# Patient Record
Sex: MALE | Race: WHITE | Employment: UNEMPLOYED | URBAN - METROPOLITAN AREA
[De-identification: names, ages, dates, MRNs, and addresses within clinical notes are randomized per-mention and may not be internally consistent; named-entity substitution may affect disease eponyms.]

---

## 2022-04-27 ENCOUNTER — HOSPITAL ENCOUNTER (EMERGENCY)
Age: 62
Discharge: HOME | End: 2022-04-28
Attending: EMERGENCY MEDICINE
Payer: MEDICAID

## 2022-04-27 DIAGNOSIS — F10.920 ACUTE ALCOHOLIC INTOXICATION WITHOUT COMPLICATION (HCC): Primary | ICD-10-CM

## 2022-04-27 LAB
ACETAMINOPHEN LEVEL: <5 MCG/ML (ref 10–30)
ALBUMIN SERPL-MCNC: 4.3 G/DL (ref 3.5–5.2)
ALP BLD-CCNC: 58 U/L (ref 40–129)
ALT SERPL-CCNC: 28 U/L (ref 0–40)
AMPHETAMINE SCREEN, URINE: NOT DETECTED
ANION GAP SERPL CALCULATED.3IONS-SCNC: 16 MMOL/L (ref 7–16)
AST SERPL-CCNC: 32 U/L (ref 0–39)
BARBITURATE SCREEN URINE: POSITIVE
BASOPHILS ABSOLUTE: 0.08 E9/L (ref 0–0.2)
BASOPHILS RELATIVE PERCENT: 1.7 % (ref 0–2)
BENZODIAZEPINE SCREEN, URINE: NOT DETECTED
BILIRUB SERPL-MCNC: 0.3 MG/DL (ref 0–1.2)
BUN BLDV-MCNC: 6 MG/DL (ref 6–23)
CALCIUM SERPL-MCNC: 8.9 MG/DL (ref 8.6–10.2)
CANNABINOID SCREEN URINE: NOT DETECTED
CHLORIDE BLD-SCNC: 100 MMOL/L (ref 98–107)
CO2: 23 MMOL/L (ref 22–29)
COCAINE METABOLITE SCREEN URINE: NOT DETECTED
CREAT SERPL-MCNC: 0.6 MG/DL (ref 0.7–1.2)
EOSINOPHILS ABSOLUTE: 0.1 E9/L (ref 0.05–0.5)
EOSINOPHILS RELATIVE PERCENT: 2.1 % (ref 0–6)
ETHANOL: 124 MG/DL (ref 0–0.08)
FENTANYL SCREEN, URINE: NOT DETECTED
GFR AFRICAN AMERICAN: >60
GFR NON-AFRICAN AMERICAN: >60 ML/MIN/1.73
GLUCOSE BLD-MCNC: 87 MG/DL (ref 74–99)
HCT VFR BLD CALC: 38.5 % (ref 37–54)
HEMOGLOBIN: 14.1 G/DL (ref 12.5–16.5)
IMMATURE GRANULOCYTES #: 0.02 E9/L
IMMATURE GRANULOCYTES %: 0.4 % (ref 0–5)
LYMPHOCYTES ABSOLUTE: 1.45 E9/L (ref 1.5–4)
LYMPHOCYTES RELATIVE PERCENT: 30.5 % (ref 20–42)
Lab: ABNORMAL
MCH RBC QN AUTO: 36.2 PG (ref 26–35)
MCHC RBC AUTO-ENTMCNC: 36.6 % (ref 32–34.5)
MCV RBC AUTO: 99 FL (ref 80–99.9)
METHADONE SCREEN, URINE: NOT DETECTED
MONOCYTES ABSOLUTE: 0.27 E9/L (ref 0.1–0.95)
MONOCYTES RELATIVE PERCENT: 5.7 % (ref 2–12)
NEUTROPHILS ABSOLUTE: 2.83 E9/L (ref 1.8–7.3)
NEUTROPHILS RELATIVE PERCENT: 59.6 % (ref 43–80)
OPIATE SCREEN URINE: NOT DETECTED
OXYCODONE URINE: NOT DETECTED
PDW BLD-RTO: 12.6 FL (ref 11.5–15)
PHENCYCLIDINE SCREEN URINE: NOT DETECTED
PLATELET # BLD: 266 E9/L (ref 130–450)
PMV BLD AUTO: 9.7 FL (ref 7–12)
POTASSIUM REFLEX MAGNESIUM: 3.6 MMOL/L (ref 3.5–5)
RBC # BLD: 3.89 E12/L (ref 3.8–5.8)
SALICYLATE, SERUM: <0.3 MG/DL (ref 0–30)
SODIUM BLD-SCNC: 139 MMOL/L (ref 132–146)
TOTAL PROTEIN: 7.7 G/DL (ref 6.4–8.3)
TRICYCLIC ANTIDEPRESSANTS SCREEN SERUM: NEGATIVE NG/ML
WBC # BLD: 4.8 E9/L (ref 4.5–11.5)

## 2022-04-27 PROCEDURE — 80143 DRUG ASSAY ACETAMINOPHEN: CPT

## 2022-04-27 PROCEDURE — 80053 COMPREHEN METABOLIC PANEL: CPT

## 2022-04-27 PROCEDURE — 80307 DRUG TEST PRSMV CHEM ANLYZR: CPT

## 2022-04-27 PROCEDURE — 82077 ASSAY SPEC XCP UR&BREATH IA: CPT

## 2022-04-27 PROCEDURE — 6360000002 HC RX W HCPCS: Performed by: INTERNAL MEDICINE

## 2022-04-27 PROCEDURE — 85025 COMPLETE CBC W/AUTO DIFF WBC: CPT

## 2022-04-27 PROCEDURE — 99284 EMERGENCY DEPT VISIT MOD MDM: CPT

## 2022-04-27 PROCEDURE — 80179 DRUG ASSAY SALICYLATE: CPT

## 2022-04-27 PROCEDURE — 2500000003 HC RX 250 WO HCPCS: Performed by: INTERNAL MEDICINE

## 2022-04-27 PROCEDURE — 96374 THER/PROPH/DIAG INJ IV PUSH: CPT

## 2022-04-27 PROCEDURE — 96375 TX/PRO/DX INJ NEW DRUG ADDON: CPT

## 2022-04-27 RX ORDER — FOLIC ACID 5 MG/ML
1 INJECTION, SOLUTION INTRAMUSCULAR; INTRAVENOUS; SUBCUTANEOUS DAILY
Status: DISCONTINUED | OUTPATIENT
Start: 2022-04-27 | End: 2022-04-28 | Stop reason: HOSPADM

## 2022-04-27 RX ORDER — THIAMINE HYDROCHLORIDE 100 MG/ML
100 INJECTION, SOLUTION INTRAMUSCULAR; INTRAVENOUS DAILY
Status: DISCONTINUED | OUTPATIENT
Start: 2022-04-27 | End: 2022-04-28 | Stop reason: HOSPADM

## 2022-04-27 RX ADMIN — FOLIC ACID 1 MG: 5 INJECTION, SOLUTION INTRAMUSCULAR; INTRAVENOUS; SUBCUTANEOUS at 15:44

## 2022-04-27 RX ADMIN — THIAMINE HYDROCHLORIDE 100 MG: 100 INJECTION, SOLUTION INTRAMUSCULAR; INTRAVENOUS at 15:43

## 2022-04-27 ASSESSMENT — PAIN - FUNCTIONAL ASSESSMENT: PAIN_FUNCTIONAL_ASSESSMENT: NONE - DENIES PAIN

## 2022-04-27 NOTE — ED NOTES
Patient argumentative, cursing loudly about wanting to smoke, after police arrived, patient began to calm down. Patient decided to fall asleep.       Jose Patino RN  04/27/22 3271

## 2022-04-27 NOTE — ED NOTES
HISTORY OF PRESENT ILLNESS:  (Nurses Notes Reviewed)    Chief Complaint:  Alcohol Intoxication (states he wants help. Last drink was approx 20 min ago and it was beer )     Breana Posey is a 57 y.o. old male presenting to the emergency department for alcohol intoxication. Patient's last drink was this morning although he can't recall exactly when. He says he came from [facility], [address] say how he arrived here. He says he has been drinking beer and vodka mostly everyday. And says he drinks \"A LOT\" but doesn't want to quantify. He is disoriented and endorses headaches, says that he always has constant generalized headache. Denies any trauma. Denies any illicit drug intake. REVIEW OF SYSTEMS    Pertinent positives and negatives are stated within the HPI, all other systems reviewed and are negative. At least 10 systems reviewed with the patient and pertinent negative symptoms listed below. Consitutional: No fever chills change or unexplained weight loss. Eye: No vision changes or diplopia  ENT: No dysphagia or ear drainage  Head: (+) headaches  Neck No neck rigidity deformity or masses  Lungs: No cough wheezing or hemoptysis  Heart: no palpitation diaphoresis or syncope  Abdomen No Abdominal pain or hematemesis. Genitourinary: No discharge or genital lesions reported. Neurologic: No weakness numbness or gait abnormalities. Psychiatric; No hallucination, delusion homicidal or suicidal thoughts. Physical Exam:    General:  No acute distress noted. Patient is well nourished and well developed. Vital signs and nurses assesement reviewed. Head: Normocephalic, atraumatic, oral exam showed no abnormalities. Eyes: PERRLA, EOMI, No scleral icterus or papliedema. Neck: No thyroid masses, carotid bruits, tracheal deviation or significant adenopathy. Lungs: Clear to auscultation bilaterally. No rales, rhonchi or pleural rubs. Heart: Normal PMI, No gallop, murmurs, rubs or abnormal heart sounds.   Abdomen: Soft without tenderness rigidity, rebound tenderness, organ enlargement or masses. Normal bowel sounds activity. Extremities: No dependent edema, cyanosis, calf tenderness or deformities. Jesse's sign negative bilaterally. Peripheral pulses were normal and symmetrical.  Neurologic: Awake and alert, with normal speech and comprehension. Cranial nerve grossly intact, normal muscle power, tone and deep tendon reflexes. No sensory loss. Normal gait and co-ordination, mild bilateral hand tremors   Skin: No rashes, ulcers, cyanosis or pallor. Capillary refill normal.      --------------------------------------------- PAST HISTORY ---------------------------------------------      Past Medical History:  has no past medical history on file. Past Surgical History:  has a past surgical history that includes Humerus surgery (Left, 5/6/16). Social History:  reports that he has been smoking. He does not have any smokeless tobacco history on file. He reports current alcohol use. Family History: family history is not on file. The patients home medications have been reviewed.   Allergies: Patient has no allergy information on record.    -------------------------------------------------- RESULTS -------------------------------------------------      Results for orders placed or performed during the hospital encounter of 04/27/22   CBC with Auto Differential   Result Value Ref Range    WBC 4.8 4.5 - 11.5 E9/L    RBC 3.89 3.80 - 5.80 E12/L    Hemoglobin 14.1 12.5 - 16.5 g/dL    Hematocrit 38.5 37.0 - 54.0 %    MCV 99.0 80.0 - 99.9 fL    MCH 36.2 (H) 26.0 - 35.0 pg    MCHC 36.6 (H) 32.0 - 34.5 %    RDW 12.6 11.5 - 15.0 fL    Platelets 379 145 - 773 E9/L    MPV 9.7 7.0 - 12.0 fL    Neutrophils % 59.6 43.0 - 80.0 %    Immature Granulocytes % 0.4 0.0 - 5.0 %    Lymphocytes % 30.5 20.0 - 42.0 %    Monocytes % 5.7 2.0 - 12.0 %    Eosinophils % 2.1 0.0 - 6.0 %    Basophils % 1.7 0.0 - 2.0 %    Neutrophils Absolute 2.83 1.80 - 7.30 E9/L Immature Granulocytes # 0.02 E9/L    Lymphocytes Absolute 1.45 (L) 1.50 - 4.00 E9/L    Monocytes Absolute 0.27 0.10 - 0.95 E9/L    Eosinophils Absolute 0.10 0.05 - 0.50 E9/L    Basophils Absolute 0.08 0.00 - 0.20 E9/L   URINE DRUG SCREEN   Result Value Ref Range    Drug Screen Comment: see below      No orders to display       All above test results were reviewed in details. ------------------------- NURSING NOTES AND VITALS REVIEWED ---------------------------   The nursing notes within the ED encounter and vital signs as below have been reviewed. BP (!) 122/56   Pulse 63   Temp 97.2 °F (36.2 °C)   Resp 18   Ht 6' 4\" (1.93 m)   Wt 220 lb (99.8 kg)   SpO2 97%   BMI 26.78 kg/m²   Oxygen Saturation Interpretation: Normal      ------------------------------------------ PROGRESS NOTES ------------------------------------------       I have spoken with the patient and discussed todays results, in addition to providing specific details for the plan of care and counseling regarding the diagnosis and prognosis. Their questions are answered at this time and they are agreeable with the plan. Medical Decision Making Rationale: This patient presented emergency room with the symptoms of alcohol intoxication. Patient given thiamine and folate. CBC, CMP, Ethanol and urine drug screen. UDS (+) for Barbiturates. --------------------------------- ADDITIONAL PROVIDER NOTES ---------------------------------      Disposition pending sobriety, possible social work consult for homelessness. Clinical Impression:  1.  Alcohol Intoxication     Chandrakant Santiago MD  Resident  04/27/22 8924

## 2022-04-27 NOTE — ED NOTES
Patient became verbally abusive, pd called to calm the patient down.  Patient now eating some food and has calmed down after officers spoke to him      Manuel Myrick RN  04/27/22 0984

## 2022-04-28 VITALS
OXYGEN SATURATION: 98 % | TEMPERATURE: 97.5 F | DIASTOLIC BLOOD PRESSURE: 90 MMHG | WEIGHT: 220 LBS | HEART RATE: 70 BPM | HEIGHT: 76 IN | RESPIRATION RATE: 18 BRPM | BODY MASS INDEX: 26.79 KG/M2 | SYSTOLIC BLOOD PRESSURE: 162 MMHG

## 2022-04-28 LAB
ACETAMINOPHEN LEVEL: <5 MCG/ML (ref 10–30)
ETHANOL: <10 MG/DL (ref 0–0.08)
SALICYLATE, SERUM: <0.3 MG/DL (ref 0–30)
TRICYCLIC ANTIDEPRESSANTS SCREEN SERUM: NEGATIVE NG/ML

## 2022-04-28 PROCEDURE — 80143 DRUG ASSAY ACETAMINOPHEN: CPT

## 2022-04-28 PROCEDURE — 6360000002 HC RX W HCPCS: Performed by: INTERNAL MEDICINE

## 2022-04-28 PROCEDURE — 2500000003 HC RX 250 WO HCPCS: Performed by: INTERNAL MEDICINE

## 2022-04-28 PROCEDURE — 96374 THER/PROPH/DIAG INJ IV PUSH: CPT

## 2022-04-28 PROCEDURE — 80307 DRUG TEST PRSMV CHEM ANLYZR: CPT

## 2022-04-28 PROCEDURE — 82077 ASSAY SPEC XCP UR&BREATH IA: CPT

## 2022-04-28 PROCEDURE — 80179 DRUG ASSAY SALICYLATE: CPT

## 2022-04-28 PROCEDURE — 96375 TX/PRO/DX INJ NEW DRUG ADDON: CPT

## 2022-04-28 PROCEDURE — 6370000000 HC RX 637 (ALT 250 FOR IP): Performed by: STUDENT IN AN ORGANIZED HEALTH CARE EDUCATION/TRAINING PROGRAM

## 2022-04-28 PROCEDURE — 36415 COLL VENOUS BLD VENIPUNCTURE: CPT

## 2022-04-28 RX ORDER — ACETAMINOPHEN 325 MG/1
650 TABLET ORAL ONCE
Status: COMPLETED | OUTPATIENT
Start: 2022-04-28 | End: 2022-04-28

## 2022-04-28 RX ADMIN — ACETAMINOPHEN 650 MG: 325 TABLET ORAL at 00:24

## 2022-04-28 RX ADMIN — THIAMINE HYDROCHLORIDE 100 MG: 100 INJECTION, SOLUTION INTRAMUSCULAR; INTRAVENOUS at 10:00

## 2022-04-28 RX ADMIN — FOLIC ACID 1 MG: 5 INJECTION, SOLUTION INTRAMUSCULAR; INTRAVENOUS; SUBCUTANEOUS at 11:42

## 2022-04-28 NOTE — ED NOTES
The pt is a 57 yr old white male who stated when he arrived by [facility] that his last drink was 20 min ago. He is now sober 10 hours later and he stated that his  brought him to a rehab from [facility] and he has no idea how he got here. He stated that he also is unsure as to what rehab that he was sent to. He denied any hx of SI and HI (CSSRS completed). .  He stated that he drinks everyday the equivalent to a 12 pack plus some liquor. He reported that he has a hx of having a seizure in the past but stated that it was \"years ago\". He denied that he has seizures regularly with withdrawal.  He stated that he does go through withdrawal when he stops drinking but denies current symptoms at this time. The pt stated that he would still like to be referred to rehab/detox. Call to Fox Sen at [facility]- Inquired as to wether or not the pt was with them earlier for admission. She stated that she is not sure and would return call- requesting new etoh level. After to fax paperwork.      Allison Wilcox, [facility]  04/27/22 6940

## 2022-04-28 NOTE — ED NOTES
SILVANO CARRANZA faxed redraw results to [employer] [facility] at [facility].      LISSA Browning, [place]  04/28/22 2007

## 2022-04-28 NOTE — ED NOTES
Garima from lab sts ethanol level is still running and will be \"done shortly. \"      Karla Enrique RN  04/27/22 2023

## 2022-04-28 NOTE — ED NOTES
SLIVANO CARRANZA spoke with Prema at [facility], she reports she did not receive fax for redraw.  Prema is reporting something is wrong with fax machine and is requesting we finish this referral with morning staff to please call after LISSA Vo, [facility]  04/28/22 0360

## 2022-04-28 NOTE — ED NOTES
[facility] with provide a ride - picking up in the ER and transporting to their facility.      LORETO Gomez  04/28/22 1124

## 2022-04-28 NOTE — ED NOTES
I called [facility] [phone] and spoke to Isabel Givens [phone] and gave basic information    Pt is already on their wait list for [facility] MRN #[id number] - no assessment needed at this time    Faxed chart information to [phone] to Anatoliy Coffman [phone]    Awaiting call back with acceptance     Kaitlyn Zepeda, LORETO  04/28/22 8888

## 2022-04-28 NOTE — ED NOTES
Pt has been accepted to [facility] and is scheduled for 1200     Discussed plan with Dr. Oh Keene     Pt has no money, is not familiar with the bus system to get him to [place] by 1200.     Inquiring about transportation through [place].      LORETO Solitario  04/28/22 1127

## 2022-04-28 NOTE — ED NOTES
Prema from [facility] called to see if redraw has been completed on this patient. Informed one had been ordered but not completed at this time. SILVANO CARRANZA informed Prema that RN will be reminded and results will be faxed to her at [phone].       SILVANO CARRANZA informed RN for this patient     LISSA Gifford, [place]  04/28/22 8706

## 2022-05-03 NOTE — ED PROVIDER NOTES
HISTORY OF PRESENT ILLNESS:  (Nurses Notes Reviewed)     Chief Complaint:  Alcohol Intoxication (states he wants help. Last drink was approx 20 min ago and it was beer )                 Monique Muniz is a 57 y.o. old male presenting to the emergency department for alcohol intoxication. Patient's last drink was this morning although he can't recall exactly when. He says he came from [facility], [address] [place] [address] Road say how he arrived here. He says he has been drinking beer and vodka mostly everyday. And says he drinks \"A LOT\" but doesn't want to quantify. He is disoriented and endorses headaches, says that he always has constant generalized headache. Denies any trauma. Denies any illicit drug intake.      REVIEW OF SYSTEMS     Pertinent positives and negatives are stated within the HPI, all other systems reviewed and are negative.      At least 10 systems reviewed with the patient and pertinent negative symptoms listed below.     Consitutional: No fever chills change or unexplained weight loss. Eye: No vision changes or diplopia  ENT: No dysphagia or ear drainage  Head: (+) headaches  Neck No neck rigidity deformity or masses  Lungs: No cough wheezing or hemoptysis  Heart: no palpitation diaphoresis or syncope  Abdomen No Abdominal pain or hematemesis. Genitourinary: No discharge or genital lesions reported. Neurologic: No weakness numbness or gait abnormalities. Psychiatric; No hallucination, delusion homicidal or suicidal thoughts.        Physical Exam:     General:  No acute distress noted. Patient is well nourished and well developed. Vital signs and nurses assesement reviewed. Head: Normocephalic, atraumatic, oral exam showed no abnormalities. Eyes: PERRLA, EOMI, No scleral icterus or papliedema. Neck: No thyroid masses, carotid bruits, tracheal deviation or significant adenopathy. Lungs: Clear to auscultation bilaterally. No rales, rhonchi or pleural rubs.   Heart: Normal PMI, No gallop, murmurs, rubs or abnormal heart sounds. Abdomen: Soft without tenderness rigidity, rebound tenderness, organ enlargement or masses. Normal bowel sounds activity. Extremities: No dependent edema, cyanosis, calf tenderness or deformities. Jesse's sign negative bilaterally. Peripheral pulses were normal and symmetrical.  Neurologic: Awake and alert, with normal speech and comprehension. Cranial nerve grossly intact, normal muscle power, tone and deep tendon reflexes. No sensory loss. Normal gait and co-ordination, mild bilateral hand tremors   Skin: No rashes, ulcers, cyanosis or pallor. Capillary refill normal.        --------------------------------------------- PAST HISTORY ---------------------------------------------        Past Medical History:  has no past medical history on file. Past Surgical History:  has a past surgical history that includes Humerus surgery (Left, 5/6/16). Social History:  reports that he has been smoking. He does not have any smokeless tobacco history on file. He reports current alcohol use. Family History: family history is not on file. The patients home medications have been reviewed.   Allergies: Patient has no allergy information on record.     -------------------------------------------------- RESULTS -------------------------------------------------              Results for orders placed or performed during the hospital encounter of 04/27/22   CBC with Auto Differential   Result Value Ref Range     WBC 4.8 4.5 - 11.5 E9/L     RBC 3.89 3.80 - 5.80 E12/L     Hemoglobin 14.1 12.5 - 16.5 g/dL     Hematocrit 38.5 37.0 - 54.0 %     MCV 99.0 80.0 - 99.9 fL     MCH 36.2 (H) 26.0 - 35.0 pg     MCHC 36.6 (H) 32.0 - 34.5 %     RDW 12.6 11.5 - 15.0 fL     Platelets 909 434 - 706 E9/L     MPV 9.7 7.0 - 12.0 fL     Neutrophils % 59.6 43.0 - 80.0 %     Immature Granulocytes % 0.4 0.0 - 5.0 %     Lymphocytes % 30.5 20.0 - 42.0 %     Monocytes % 5.7 2.0 - 12.0 %     Eosinophils % 2.1 0.0 - 6.0 %     Basophils % 1.7 0.0 - 2.0 %     Neutrophils Absolute 2.83 1.80 - 7.30 E9/L     Immature Granulocytes # 0.02 E9/L     Lymphocytes Absolute 1.45 (L) 1.50 - 4.00 E9/L     Monocytes Absolute 0.27 0.10 - 0.95 E9/L     Eosinophils Absolute 0.10 0.05 - 0.50 E9/L     Basophils Absolute 0.08 0.00 - 0.20 E9/L   URINE DRUG SCREEN   Result Value Ref Range     Drug Screen Comment: see below        No orders to display         All above test results were reviewed in details.        ------------------------- NURSING NOTES AND VITALS REVIEWED ---------------------------              The nursing notes within the ED encounter and vital signs as below have been reviewed. BP (!) 122/56   Pulse 63   Temp 97.2 °F (36.2 °C)   Resp 18   Ht 6' 4\" (1.93 m)   Wt 220 lb (99.8 kg)   SpO2 97%   BMI 26.78 kg/m²   Oxygen Saturation Interpretation: Normal        ------------------------------------------ PROGRESS NOTES ------------------------------------------                    I have spoken with the patient and discussed todays results, in addition to providing specific details for the plan of care and counseling regarding the diagnosis and prognosis. Their questions are answered at this time and they are agreeable with the plan.     Medical Decision Making Rationale:     This patient presented emergency room with the symptoms of alcohol intoxication. Patient given thiamine and folate. CBC, CMP, Ethanol and urine drug screen. UDS (+) for Barbiturates.      --------------------------------- ADDITIONAL PROVIDER NOTES ---------------------------------        Disposition pending sobriety, possible social work consult for homelessness.       Clinical Impression:  1.  Alcohol Intoxication     Marta Siddiqi MD  Resident  04/27/22 4526 [facility] Juan Luis Holt MD  Resident  05/03/22 3506

## 2022-05-04 ENCOUNTER — APPOINTMENT (OUTPATIENT)
Dept: GENERAL RADIOLOGY | Age: 62
DRG: 197 | End: 2022-05-04
Payer: MEDICAID

## 2022-05-04 LAB
ALBUMIN SERPL-MCNC: 4.3 G/DL (ref 3.5–5.2)
ALP BLD-CCNC: 79 U/L (ref 40–129)
ALT SERPL-CCNC: 23 U/L (ref 0–40)
ANION GAP SERPL CALCULATED.3IONS-SCNC: 10 MMOL/L (ref 7–16)
AST SERPL-CCNC: 21 U/L (ref 0–39)
BASOPHILS ABSOLUTE: 0.07 E9/L (ref 0–0.2)
BASOPHILS RELATIVE PERCENT: 1.3 % (ref 0–2)
BILIRUB SERPL-MCNC: 0.2 MG/DL (ref 0–1.2)
BUN BLDV-MCNC: 22 MG/DL (ref 6–23)
C-REACTIVE PROTEIN: 0.3 MG/DL (ref 0–0.4)
CALCIUM SERPL-MCNC: 9.1 MG/DL (ref 8.6–10.2)
CHLORIDE BLD-SCNC: 101 MMOL/L (ref 98–107)
CO2: 26 MMOL/L (ref 22–29)
CREAT SERPL-MCNC: 0.7 MG/DL (ref 0.7–1.2)
EOSINOPHILS ABSOLUTE: 0.11 E9/L (ref 0.05–0.5)
EOSINOPHILS RELATIVE PERCENT: 2 % (ref 0–6)
GFR AFRICAN AMERICAN: >60
GFR NON-AFRICAN AMERICAN: >60 ML/MIN/1.73
GLUCOSE BLD-MCNC: 103 MG/DL (ref 74–99)
HCT VFR BLD CALC: 37.9 % (ref 37–54)
HEMOGLOBIN: 12.7 G/DL (ref 12.5–16.5)
IMMATURE GRANULOCYTES #: 0.01 E9/L
IMMATURE GRANULOCYTES %: 0.2 % (ref 0–5)
LACTIC ACID: 1.2 MMOL/L (ref 0.5–2.2)
LYMPHOCYTES ABSOLUTE: 1.34 E9/L (ref 1.5–4)
LYMPHOCYTES RELATIVE PERCENT: 24.8 % (ref 20–42)
MCH RBC QN AUTO: 34.2 PG (ref 26–35)
MCHC RBC AUTO-ENTMCNC: 33.5 % (ref 32–34.5)
MCV RBC AUTO: 102.2 FL (ref 80–99.9)
MONOCYTES ABSOLUTE: 0.62 E9/L (ref 0.1–0.95)
MONOCYTES RELATIVE PERCENT: 11.5 % (ref 2–12)
NEUTROPHILS ABSOLUTE: 3.25 E9/L (ref 1.8–7.3)
NEUTROPHILS RELATIVE PERCENT: 60.2 % (ref 43–80)
PDW BLD-RTO: 12.7 FL (ref 11.5–15)
PLATELET # BLD: 175 E9/L (ref 130–450)
PMV BLD AUTO: 10.6 FL (ref 7–12)
POTASSIUM REFLEX MAGNESIUM: 4.4 MMOL/L (ref 3.5–5)
RBC # BLD: 3.71 E12/L (ref 3.8–5.8)
SODIUM BLD-SCNC: 137 MMOL/L (ref 132–146)
TOTAL PROTEIN: 7.8 G/DL (ref 6.4–8.3)
WBC # BLD: 5.4 E9/L (ref 4.5–11.5)

## 2022-05-04 PROCEDURE — 73630 X-RAY EXAM OF FOOT: CPT

## 2022-05-04 PROCEDURE — 93005 ELECTROCARDIOGRAM TRACING: CPT | Performed by: PHYSICIAN ASSISTANT

## 2022-05-04 PROCEDURE — 87040 BLOOD CULTURE FOR BACTERIA: CPT

## 2022-05-04 PROCEDURE — 85651 RBC SED RATE NONAUTOMATED: CPT

## 2022-05-04 PROCEDURE — 83605 ASSAY OF LACTIC ACID: CPT

## 2022-05-04 PROCEDURE — 80053 COMPREHEN METABOLIC PANEL: CPT

## 2022-05-04 PROCEDURE — 99285 EMERGENCY DEPT VISIT HI MDM: CPT

## 2022-05-04 PROCEDURE — 85025 COMPLETE CBC W/AUTO DIFF WBC: CPT

## 2022-05-04 PROCEDURE — 86140 C-REACTIVE PROTEIN: CPT

## 2022-05-04 ASSESSMENT — LIFESTYLE VARIABLES
HOW MANY STANDARD DRINKS CONTAINING ALCOHOL DO YOU HAVE ON A TYPICAL DAY: 7 TO 9
HOW OFTEN DO YOU HAVE A DRINK CONTAINING ALCOHOL: MONTHLY OR LESS

## 2022-05-04 ASSESSMENT — PAIN DESCRIPTION - DESCRIPTORS: DESCRIPTORS: THROBBING;ACHING

## 2022-05-04 ASSESSMENT — PAIN SCALES - GENERAL: PAINLEVEL_OUTOF10: 10

## 2022-05-04 ASSESSMENT — PAIN DESCRIPTION - ORIENTATION: ORIENTATION: LEFT

## 2022-05-04 ASSESSMENT — PAIN - FUNCTIONAL ASSESSMENT: PAIN_FUNCTIONAL_ASSESSMENT: 0-10

## 2022-05-04 ASSESSMENT — PAIN DESCRIPTION - LOCATION: LOCATION: TOE (COMMENT WHICH ONE)

## 2022-05-04 ASSESSMENT — PAIN DESCRIPTION - FREQUENCY: FREQUENCY: CONTINUOUS

## 2022-05-05 ENCOUNTER — APPOINTMENT (OUTPATIENT)
Dept: INTERVENTIONAL RADIOLOGY/VASCULAR | Age: 62
DRG: 197 | End: 2022-05-05
Payer: MEDICAID

## 2022-05-05 ENCOUNTER — HOSPITAL ENCOUNTER (INPATIENT)
Age: 62
LOS: 1 days | Discharge: LEFT BEFORE DISCHARGE | DRG: 197 | End: 2022-05-05
Attending: EMERGENCY MEDICINE | Admitting: INTERNAL MEDICINE
Payer: MEDICAID

## 2022-05-05 ENCOUNTER — APPOINTMENT (OUTPATIENT)
Dept: ULTRASOUND IMAGING | Age: 62
DRG: 197 | End: 2022-05-05
Payer: MEDICAID

## 2022-05-05 VITALS
RESPIRATION RATE: 18 BRPM | TEMPERATURE: 97.5 F | DIASTOLIC BLOOD PRESSURE: 86 MMHG | BODY MASS INDEX: 26.79 KG/M2 | HEIGHT: 76 IN | SYSTOLIC BLOOD PRESSURE: 137 MMHG | WEIGHT: 220 LBS | OXYGEN SATURATION: 98 % | HEART RATE: 63 BPM

## 2022-05-05 DIAGNOSIS — S91.109A OPEN WOUND OF TOE, INITIAL ENCOUNTER: Primary | ICD-10-CM

## 2022-05-05 PROBLEM — L08.9 WOUND INFECTION: Status: ACTIVE | Noted: 2022-05-05

## 2022-05-05 PROBLEM — T14.8XXA WOUND INFECTION: Status: ACTIVE | Noted: 2022-05-05

## 2022-05-05 LAB
CHOLESTEROL, TOTAL: 158 MG/DL (ref 0–199)
EKG ATRIAL RATE: 59 BPM
EKG P AXIS: 76 DEGREES
EKG P-R INTERVAL: 164 MS
EKG Q-T INTERVAL: 450 MS
EKG QRS DURATION: 90 MS
EKG QTC CALCULATION (BAZETT): 445 MS
EKG R AXIS: 80 DEGREES
EKG T AXIS: 73 DEGREES
EKG VENTRICULAR RATE: 59 BPM
HBA1C MFR BLD: 6 % (ref 4–5.6)
HDLC SERPL-MCNC: 71 MG/DL
LDL CHOLESTEROL CALCULATED: 74 MG/DL (ref 0–99)
SEDIMENTATION RATE, ERYTHROCYTE: 41 MM/HR (ref 0–15)
TRIGL SERPL-MCNC: 65 MG/DL (ref 0–149)
URIC ACID, SERUM: 5.5 MG/DL (ref 3.4–7)
VLDLC SERPL CALC-MCNC: 13 MG/DL

## 2022-05-05 PROCEDURE — 99223 1ST HOSP IP/OBS HIGH 75: CPT | Performed by: INTERNAL MEDICINE

## 2022-05-05 PROCEDURE — 93923 UPR/LXTR ART STDY 3+ LVLS: CPT

## 2022-05-05 PROCEDURE — G0378 HOSPITAL OBSERVATION PER HR: HCPCS

## 2022-05-05 PROCEDURE — 80061 LIPID PANEL: CPT

## 2022-05-05 PROCEDURE — 36415 COLL VENOUS BLD VENIPUNCTURE: CPT

## 2022-05-05 PROCEDURE — 93971 EXTREMITY STUDY: CPT | Performed by: RADIOLOGY

## 2022-05-05 PROCEDURE — 6370000000 HC RX 637 (ALT 250 FOR IP): Performed by: INTERNAL MEDICINE

## 2022-05-05 PROCEDURE — 84550 ASSAY OF BLOOD/URIC ACID: CPT

## 2022-05-05 PROCEDURE — 1200000000 HC SEMI PRIVATE

## 2022-05-05 PROCEDURE — 96372 THER/PROPH/DIAG INJ SC/IM: CPT

## 2022-05-05 PROCEDURE — 93970 EXTREMITY STUDY: CPT

## 2022-05-05 PROCEDURE — 83036 HEMOGLOBIN GLYCOSYLATED A1C: CPT

## 2022-05-05 PROCEDURE — 93010 ELECTROCARDIOGRAM REPORT: CPT | Performed by: INTERNAL MEDICINE

## 2022-05-05 PROCEDURE — 93923 UPR/LXTR ART STDY 3+ LVLS: CPT | Performed by: SURGERY

## 2022-05-05 RX ORDER — SODIUM CHLORIDE 0.9 % (FLUSH) 0.9 %
5-40 SYRINGE (ML) INJECTION EVERY 12 HOURS SCHEDULED
Status: DISCONTINUED | OUTPATIENT
Start: 2022-05-05 | End: 2022-05-05 | Stop reason: HOSPADM

## 2022-05-05 RX ORDER — SODIUM CHLORIDE 0.9 % (FLUSH) 0.9 %
5-40 SYRINGE (ML) INJECTION PRN
Status: DISCONTINUED | OUTPATIENT
Start: 2022-05-05 | End: 2022-05-05 | Stop reason: HOSPADM

## 2022-05-05 RX ORDER — ACETAMINOPHEN 500 MG
500 TABLET ORAL EVERY 6 HOURS PRN
Qty: 12 TABLET | Refills: 0 | Status: SHIPPED | OUTPATIENT
Start: 2022-05-05 | End: 2022-05-08

## 2022-05-05 RX ORDER — ONDANSETRON 2 MG/ML
4 INJECTION INTRAMUSCULAR; INTRAVENOUS EVERY 6 HOURS PRN
Status: DISCONTINUED | OUTPATIENT
Start: 2022-05-05 | End: 2022-05-05 | Stop reason: HOSPADM

## 2022-05-05 RX ORDER — NICOTINE 21 MG/24HR
1 PATCH, TRANSDERMAL 24 HOURS TRANSDERMAL DAILY
Status: DISCONTINUED | OUTPATIENT
Start: 2022-05-05 | End: 2022-05-05 | Stop reason: HOSPADM

## 2022-05-05 RX ORDER — ACETAMINOPHEN 325 MG/1
650 TABLET ORAL EVERY 6 HOURS PRN
Status: DISCONTINUED | OUTPATIENT
Start: 2022-05-05 | End: 2022-05-05 | Stop reason: HOSPADM

## 2022-05-05 RX ORDER — SODIUM CHLORIDE 9 MG/ML
INJECTION, SOLUTION INTRAVENOUS PRN
Status: DISCONTINUED | OUTPATIENT
Start: 2022-05-05 | End: 2022-05-05 | Stop reason: HOSPADM

## 2022-05-05 RX ORDER — ACETAMINOPHEN 650 MG/1
650 SUPPOSITORY RECTAL EVERY 6 HOURS PRN
Status: DISCONTINUED | OUTPATIENT
Start: 2022-05-05 | End: 2022-05-05 | Stop reason: HOSPADM

## 2022-05-05 RX ORDER — ENOXAPARIN SODIUM 100 MG/ML
40 INJECTION SUBCUTANEOUS DAILY
Status: DISCONTINUED | OUTPATIENT
Start: 2022-05-05 | End: 2022-05-05 | Stop reason: HOSPADM

## 2022-05-05 RX ORDER — POLYETHYLENE GLYCOL 3350 17 G/17G
17 POWDER, FOR SOLUTION ORAL DAILY PRN
Status: DISCONTINUED | OUTPATIENT
Start: 2022-05-05 | End: 2022-05-05 | Stop reason: HOSPADM

## 2022-05-05 RX ORDER — ONDANSETRON 4 MG/1
4 TABLET, ORALLY DISINTEGRATING ORAL EVERY 8 HOURS PRN
Status: DISCONTINUED | OUTPATIENT
Start: 2022-05-05 | End: 2022-05-05 | Stop reason: HOSPADM

## 2022-05-05 RX ADMIN — ACETAMINOPHEN 650 MG: 325 TABLET ORAL at 09:03

## 2022-05-05 ASSESSMENT — PAIN DESCRIPTION - LOCATION: LOCATION: LEG;FOOT

## 2022-05-05 ASSESSMENT — PAIN DESCRIPTION - ORIENTATION: ORIENTATION: LEFT

## 2022-05-05 ASSESSMENT — PAIN SCALES - GENERAL
PAINLEVEL_OUTOF10: 5
PAINLEVEL_OUTOF10: 3

## 2022-05-05 ASSESSMENT — PAIN DESCRIPTION - DESCRIPTORS
DESCRIPTORS: ACHING;DISCOMFORT
DESCRIPTORS: DISCOMFORT;ACHING

## 2022-05-05 ASSESSMENT — ENCOUNTER SYMPTOMS
COLOR CHANGE: 0
SHORTNESS OF BREATH: 0
COUGH: 0
BACK PAIN: 0
CHEST TIGHTNESS: 0

## 2022-05-05 NOTE — PROGRESS NOTES
Patient came to floor from ED without IV access. This nurse talked to patient about importance of IV. Patient is refusing IV at this time.

## 2022-05-05 NOTE — PROGRESS NOTES
Patient is refusing all treatments, yelling and swearing at staff. This nurse called security and they came up to talk with patient. This nurse called the facility requesting transport for patient.

## 2022-05-05 NOTE — CARE COORDINATION
5/5/2022 social work transition of care planning  Sw attempted to meet with pt earlier,he was out of room. Sw attempted a second time, physicians came out of room and notified sw pt stated that he is leaving AMA.   Electronically signed by LORETO Escobar on 5/5/2022 at 2:39 PM

## 2022-05-05 NOTE — ED PROVIDER NOTES
ED Attending shared visit  CC: No        Department of Emergency Medicine   ED  Provider Note  Admit Date/RoomTime: 5/5/2022 12:42 AM  ED Room: 17A/17A-17  HPI:  5/5/22, Time: 12:24 AM EDT      The patient is a 19-year-old male with a history of alcohol abuse presenting to the emergency department with pain to his left great toe. Patient initially reported that he injured it 2 weeks ago and then \"was not doing what he was supposed to be doing\" to care for it. Patient states that has been hurting for 2 weeks but does not know exactly how he injured it. Patient has no primary care physician and has not had medical care in some time. He states there is some numbness to the toe. Patient reluctant to provide better history. He states he is in [place] currently for treatment for alcohol abuse. Patient denies any history of diabetes. He also denies any fevers or chills, myalgias, drainage from the toe, calf pain or swelling. The history is provided by the patient. No  was used. REVIEW OF SYSTEMS:  Review of Systems   Constitutional: Negative for activity change, chills, fatigue and fever. Respiratory: Negative for cough, chest tightness and shortness of breath. Cardiovascular: Negative for chest pain, palpitations and leg swelling. Genitourinary: Negative for dysuria, flank pain, frequency and hematuria. Musculoskeletal: Positive for arthralgias. Negative for back pain, neck pain and neck stiffness. Skin: Positive for wound. Negative for color change, pallor and rash. Neurological: Negative for dizziness, light-headedness and headaches. Psychiatric/Behavioral: Negative for agitation, behavioral problems and confusion.       Pertinent positives and negatives are stated within HPI, all other systems reviewed and are negative.      --------------------------------------------- PAST HISTORY ---------------------------------------------  Past Medical History:  has no past medical history on file. Past Surgical History:  has a past surgical history that includes Humerus surgery (Left, 5/6/16). Social History:  reports that he has been smoking cigarettes. He has a 20.00 pack-year smoking history. He has never used smokeless tobacco. He reports previous alcohol use. He reports that he does not use drugs. Family History: family history is not on file. The patients home medications have been reviewed. Allergies: Patient has no known allergies.     -------------------------------------------------- RESULTS -------------------------------------------------  All laboratory and radiology results have been personally reviewed by myself   LABS:  Results for orders placed or performed during the hospital encounter of 05/05/22   CBC with Auto Differential   Result Value Ref Range    WBC 5.4 4.5 - 11.5 E9/L    RBC 3.71 (L) 3.80 - 5.80 E12/L    Hemoglobin 12.7 12.5 - 16.5 g/dL    Hematocrit 37.9 37.0 - 54.0 %    .2 (H) 80.0 - 99.9 fL    MCH 34.2 26.0 - 35.0 pg    MCHC 33.5 32.0 - 34.5 %    RDW 12.7 11.5 - 15.0 fL    Platelets 253 829 - 606 E9/L    MPV 10.6 7.0 - 12.0 fL    Neutrophils % 60.2 43.0 - 80.0 %    Immature Granulocytes % 0.2 0.0 - 5.0 %    Lymphocytes % 24.8 20.0 - 42.0 %    Monocytes % 11.5 2.0 - 12.0 %    Eosinophils % 2.0 0.0 - 6.0 %    Basophils % 1.3 0.0 - 2.0 %    Neutrophils Absolute 3.25 1.80 - 7.30 E9/L    Immature Granulocytes # 0.01 E9/L    Lymphocytes Absolute 1.34 (L) 1.50 - 4.00 E9/L    Monocytes Absolute 0.62 0.10 - 0.95 E9/L    Eosinophils Absolute 0.11 0.05 - 0.50 E9/L    Basophils Absolute 0.07 0.00 - 0.20 E9/L   Comprehensive Metabolic Panel w/ Reflex to MG   Result Value Ref Range    Sodium 137 132 - 146 mmol/L    Potassium reflex Magnesium 4.4 3.5 - 5.0 mmol/L    Chloride 101 98 - 107 mmol/L    CO2 26 22 - 29 mmol/L    Anion Gap 10 7 - 16 mmol/L    Glucose 103 (H) 74 - 99 mg/dL    BUN 22 6 - 23 mg/dL    CREATININE 0.7 0.7 - 1.2 mg/dL    GFR Non-African American >60 >=60 mL/min/1.73    GFR African American >60     Calcium 9.1 8.6 - 10.2 mg/dL    Total Protein 7.8 6.4 - 8.3 g/dL    Albumin 4.3 3.5 - 5.2 g/dL    Total Bilirubin 0.2 0.0 - 1.2 mg/dL    Alkaline Phosphatase 79 40 - 129 U/L    ALT 23 0 - 40 U/L    AST 21 0 - 39 U/L   Lactic Acid   Result Value Ref Range    Lactic Acid 1.2 0.5 - 2.2 mmol/L   Sedimentation Rate   Result Value Ref Range    Sed Rate 41 (H) 0 - 15 mm/Hr   C-Reactive Protein   Result Value Ref Range    CRP 0.3 0.0 - 0.4 mg/dL       RADIOLOGY:  Interpreted by Radiologist.  XR FOOT LEFT (MIN 3 VIEWS)   Final Result   Soft tissue edema adjacent to the distal tuft great toe with possible small   amount of air. No definitive evidence for osteomyelitis.             ------------------------- NURSING NOTES AND VITALS REVIEWED ---------------------------   The nursing notes within the ED encounter and vital signs as below have been reviewed. BP (!) 156/87   Pulse 67   Temp 98.5 °F (36.9 °C) (Oral)   Resp 18   Ht 6' 4\" (1.93 m)   Wt 220 lb (99.8 kg)   SpO2 96%   BMI 26.78 kg/m²   Oxygen Saturation Interpretation: Normal      ---------------------------------------------------PHYSICAL EXAM--------------------------------------    Physical Exam  Vitals and nursing note reviewed. Constitutional:       General: He is not in acute distress. Appearance: Normal appearance. He is well-developed. He is not ill-appearing. Cardiovascular:      Rate and Rhythm: Normal rate and regular rhythm. Heart sounds: Normal heart sounds. No murmur heard. Pulmonary:      Effort: Pulmonary effort is normal. No respiratory distress. Breath sounds: Normal breath sounds. Musculoskeletal:         General: No swelling, tenderness or deformity. Normal range of motion. Cervical back: Normal range of motion and neck supple. No rigidity. Comments: +discoloration and necrosis to distal left great toe with nailbed involvement. Normal cap refill and 2+ DPA pulse. No fluctuance or drainage. Skin:     General: Skin is warm and dry. Capillary Refill: Capillary refill takes less than 2 seconds. Findings: No erythema. Neurological:      General: No focal deficit present. Mental Status: He is alert and oriented to person, place, and time. Mental status is at baseline. Coordination: Coordination normal.   Psychiatric:         Mood and Affect: Mood normal.         Behavior: Behavior normal.         Thought Content: Thought content normal.                    ------------------------------ ED COURSE/MEDICAL DECISION MAKING----------------------  Medications - No data to display      ED COURSE:     XR FOOT LEFT (MIN 3 VIEWS)   Final Result   Soft tissue edema adjacent to the distal tuft great toe with possible small   amount of air. No definitive evidence for osteomyelitis. Procedures:  Procedures     Medical Decision Making:   MDM   25-year-old male presenting the ED with pain and a wound to his left great toe that he states has been there for 2 weeks. He states that occurred after an injury although the wound appears to be very chronic appearing. He has some mild necrosis of the distal tip of the left toe with nailbed involvement. He is afebrile and hemodynamically stable. His labs are unremarkable including no leukocytosis or lactic acidosis. Blood cultures obtained. X-ray does show edema to the distal tuft of the great toe with possible small amount of air which correlates with the appearance of the infection. Patient will be admitted for podiatry evaluation and antibiotic treatment. Patient agreeable to the plan. Pt admitted under the care of Dr. Marguerite Steiner for [facility] and management. Antibiotics held per Dr. Marguerite Steiner. Counseling:    The emergency provider has spoken with the patient and discussed todays results, in addition to providing specific details for the plan of care and counseling regarding the diagnosis and prognosis. Questions are answered at this time and they are agreeable with the plan.      --------------------------------- IMPRESSION AND DISPOSITION ---------------------------------    IMPRESSION  1. Open wound of toe, initial encounter        DISPOSITION  Disposition: Admit to med/surg floor  Patient condition is good      Electronically signed by Jung Pantoja MD   DD: 5/5/22  **This report was transcribed using voice recognition software. Every effort was made to ensure accuracy; however, inadvertent computerized transcription errors may be present. END OF ED PROVIDER NOTE          Adarsh Dumont PA-C  05/05/22 0209  ATTENDING PROVIDER ATTESTATION:     I have personally performed and/or participated in the history, exam, medical decision making, and procedures and agree with all pertinent clinical information. I have also reviewed and agree with the past medical, family and social history unless otherwise noted. My findings/Plan: Patient presenting because of wound to toe. Patient reports its been there for last several weeks since his left great toe. Patient reporting that he believes he injured it several weeks ago. Patient reporting no chest pain no difficulty breathing he reports no abdominal pain or vomiting he is currently at [employer] for alcohol abuse. Patient reports no drainage from the wound. Patient is awake alert [place] x3 heart lung exam normal abdomen soft and nontender. .  Patient able move all extremities. Pulses intact. Patient has noted necrotic toe to his left great toe. Patient has mild redness to the affected area as well. Patient labs and x-rays noted reviewed he was made aware of findings. We did speak to internal medicine on-call. They will evaluate and admit.        Jung Pantoja MD  05/05/22 [address]ranza MD  05/05/22 Cally Cast PA-C  05/05/22 [address], MD  05/05/22 0890

## 2022-05-05 NOTE — CONSULTS
Department of Podiatry   Consult Note        Reason for Consult: Foot Wound  CHIEF COMPLAINT:  Toe pain      HISTORY OF PRESENT ILLNESS:      Yves Spears is a 57 y.o. male with a history of alcohol abuse. Podiatry consulted for evaluation of 1st ulceration of his left foot. Patient states that he first noticed the ulcer about 3 weeks ago and since it has been progressively worsening. He's not sure how it happened and does not recall any recent trauma. He mentioned seeing a podiatrist in the past but has not been seen in several months. He has tried no means of treatment to the ulcer on his own and would like to be evaluated/treated today. Patient denies any N/V/D/F/C/SOB/CP and has no other pedal complaints at this time. Past Medical History:    · History reviewed. No pertinent past medical history. Past Surgical History:        Procedure Laterality Date    HUMERUS SURGERY Left 5/6/16    ORIF   ·     Medications Prior to Admission:    · No medications prior to admission. Allergies:  Patient has no known allergies. Social History:   · TOBACCO:   reports that he has been smoking cigarettes. He has a 20.00 pack-year smoking history. He has never used smokeless tobacco.  · ETOH:   reports previous alcohol use. DRUGS:   Social History     Substance and Sexual Activity   Drug Use Never   ·     Family History:   · History reviewed. No pertinent family history. REVIEW OF SYSTEMS:    All pertinent positives and negatives as noted in HPI       LOWER EXTREMITY EXAMINATION     VASCULAR:  DP and PT pulses are palpable. CFT < 5 seconds B/L. Warm to warm from the tibial tuberosity to the distal aspect of the digits dorsally. Hair growth noted to the distal aspects dorsally. NEUROLOGIC:  Protective sensation is diminished but grossly intact    DERM:  Skin is intact and well hydrated to the bilateral lower extremities.  Left 1st digit ulceration present with mild purulent drainage, no malodor,edema present, mild erythema. Toenails 1-5 b/l are abnormal in thickness, color and length. Webspaces 1-4 b/l are C/D/I. MUSCULOSKELETAL: contracted digits b/l.  5/5 Gross Muscle strength in all 4 quadrants. CONSULTS:  IP CONSULT TO INTERNAL MEDICINE  IP CONSULT TO PODIATRY    MEDICATION:  Scheduled Meds:   sodium chloride flush  5-40 mL IntraVENous 2 times per day    enoxaparin  40 mg SubCUTAneous Daily    nicotine  1 patch TransDERmal Daily     Continuous Infusions:   sodium chloride       PRN Meds:.sodium chloride flush, sodium chloride, ondansetron **OR** ondansetron, polyethylene glycol, acetaminophen **OR** acetaminophen    RADIOLOGY:  XR FOOT LEFT (MIN 3 VIEWS)   Final Result   Soft tissue edema adjacent to the distal tuft great toe with possible small   amount of air. No definitive evidence for osteomyelitis. VL LOWER EXTREMITY ARTERIAL SEGMENTAL PRESSURES W PPG BILATERAL    (Results Pending)   US DUP LOWER EXTREMITIES BILATERAL VENOUS    (Results Pending)       Vitals:    /86   Pulse 63   Temp 97.5 °F (36.4 °C) (Temporal)   Resp 18   Ht 6' 4\" (1.93 m)   Wt 220 lb (99.8 kg)   SpO2 98%   BMI 26.78 kg/m²     LABS:   Recent Labs     05/04/22 2208   WBC 5.4   HGB 12.7   HCT 37.9        Recent Labs     05/04/22 2208      K 4.4      CO2 26   BUN 22   CREATININE 0.7     Recent Labs     05/04/22  2208   PROT 7.8       ASSESSMENTS:   1. Foot ulceration  2.onychomycosis  3. 1st digit abscess  Wound infection            PLAN:    - Patient was examined and evaluated. Reviewed patient's recent lab results, charts and pertinent diagnostic imaging. Reviewed ancillary service notes. - Vascular studies pending  - X-rays:   Soft tissue edema adjacent to the distal tuft great toe with possible small   amount of air.  No definitive evidence for osteomyelitis.    - Dressing:DSD with betadine  -Will likely schedule for surgical intervention tomorrow for  I&D/1st digit amputation of the left foot  -Will reassess pending vascular results  - Discussed patient with Dr. Aster Sotomayor  - Will continue to follow patient while they are in-house. Thank you for the opportunity to take part in the patient's care. Please do not hesitate to call for any questions or concerns.

## 2022-05-05 NOTE — DISCHARGE SUMMARY
[address]  Discharge Summary    PCP: No primary care provider on file. Admit Date:5/5/2022  Discharge Date: 5/5/2022    Admission Diagnosis:   1. Left great toe pain likely 2/2 dry gangrene   2. Elevated blood pressure likely 2/2 pain  3. History of alcohol abuse   4. History of tobacco abuse       Discharge Diagnosis:  5. Left great toe pain likely 2/2 dry gangrene   6. Elevated blood pressure likely 2/2 pain   7. History of alcohol abuse   8. History of tobacco abuse    Hospital Course: The patient is a 57 y.o. male with PMHx of alcohol abuse who presented due to left great toe pain. Patient notes that he injured his left great toe about 3 months ago. He is unsure how he injured it. He states he was seeing a foot doctor for some time. Patient notes he received some unrecalled antibiotics as well. His wound seemed to improve in the interim however in the past 2 weeks his pain started worsening prompting visit to the ED. He denies any systemic symptoms of fevers, chills, myalgias, nausea/vomiting. The patient was in our ER about 8 days ago for alcohol intoxication and rehab placement. Since then, he has been in [facility] for alcohol rehabilitation.     At the ED, BP was 156/87, and other vitals were stable. Patient was afebrile. He had no leukocytosis. CRP was normal.  ESR was slightly elevated at 41. Left foot x-ray showed edema around the great toe with possible small air. Blood cultures were sent. Patient was admitted under house team for further management. Patient received no medications or antibiotics at the ED. After admission, patient did not had any signs symptom of infection in the leg. Patient was afebrile and no leukocytosis was present. Patient did not require any antibiotic. Podiatry and wound care was consulted. Patient was scheduled for I&D and) of amputation. Vascular work-up was ordered. Patient decided to leave [place]. After discussion with the patient, patient mentioned he understands, if he leaves AMA, his sensation was getting worse and he might lose his leg but patient mention: \"I do not care\". Patient is getting discharged with Tylenol as needed for pain. /86   Pulse 63   Temp 97.5 °F (36.4 °C) (Temporal)   Resp 18   Ht 6' 4\" (1.93 m)   Wt 220 lb (99.8 kg)   SpO2 98%   BMI 26.78 kg/m²       Significant findings (history and exam, laboratory, radiological, pathology, other tests):     § General Appearance: alert and oriented to person, place and time, well developed and well- nourished, in no acute distress  § Skin: warm and dry, no rash or erythema  § Head: normocephalic and atraumatic  § Eyes: pupils equal, round, and reactive to light, extraocular eye movements intact, conjunctivae normal  § ENT: tympanic membrane, external ear and ear canal normal bilaterally, nose without deformity, nasal mucosa and turbinates normal without polyps  § Neck: supple and non-tender without mass, no thyromegaly or thyroid nodules, no cervical lymphadenopathy  § Pulmonary/Chest: clear to auscultation bilaterally- no wheezes, rales or rhonchi, normal air movement, no respiratory distress  § Cardiovascular: normal rate, regular rhythm, normal S1 and S2, no murmurs, rubs, clicks, or gallops, distal pulses intact, no carotid bruits  § Abdomen: soft, non-tender, non-distended, normal bowel sounds, no masses or organomegaly  § Extremities: no cyanosis, clubbing or edema  § Musculoskeletal: Left great toe wound with granulation tissue, erythema around the great toe, no crepitus, mild tenderness noted; dorsalis pedis and tibial pulses intact  § Neurologic: reflexes normal and symmetric, no cranial nerve deficit, gait, coordination and speech normal       Pending test results:   1. Lower extremity arterial ultrasound  2. Lipid panel  3. Hemoglobin A1c    Consults:  1. Podiatry  2.  Wound care    Condition at discharge: Unstable    Disposition: [place]    Outpatient Recommendations:  1. Need close follow-up his toe infection. Discharge Medications:     Medication List      START taking these medications    acetaminophen 500 MG tablet  Commonly known as: TYLENOL  Take 1 tablet by mouth every 6 hours as needed for Pain           Where to Get Your Medications      These medications were sent to [facility] [address] [id number]    Phone: [phone]   · acetaminophen 500 MG tablet          Internal medicine    Follow ups   Please follow up with your primary care physician (  primary care provider on file.) within 10 days of discharge from hospital. Please call as soon as possible to make an appointment. Please contact the internal medicine clinic for an appointment if you are unable to get an appointment with your PCP. Changes in healthcare    Please take all medications as indicated   Diet: regular diet    Activity: activity as tolerated   New Medications started during this hospital stay  o Tylenol for pain as needed.  Please contact us if you have any concerns, wish to change or make an appointment:  [facility] Internal medicine clinic    Phone: [phone]   Fax: [phone] [address]   Or please call the nurse line at [phone].  Should you have further questions in regards to this visit, you can review your clinical note and after visit summary document on your [email or web address] account. Other questions can be directed to our nurse line at [phone].  Other than any new prescriptions given to you today, the list of home medications on this After Visit Summary are based on information provided to us from you and your healthcare providers. This information, including the list, dose, and frequency of medications is only as accurate as the information you provided.  If you have any questions or concerns about your home medications, please contact your Primary Care Physician for further clarification.       Bolivar Hsieh MD  PGY-1   2:52 PM 5/5/2022

## 2022-05-05 NOTE — H&P
Keron Merrill 6  Internal Medicine Residency Program  History and Physical    Patient:  Ketty Jacome 57 y.o. male MRN: [id number]     Date of Service: 5/5/2022    Hospital Day: 1      Chief complaint: had concerns including Toe Pain (states that he has a wound to the left great toe. ). History of Present Illness   The patient is a 57 y.o. male with PMHx of alcohol abuse who presented due to left great toe pain. Patient notes that he injured his left great toe about 3 months ago. He is unsure how he injured it. He states he was seeing a foot doctor for some time. Patient notes he received some unrecalled antibiotics as well. His wound seemed to improve in the interim however in the past 2 weeks his pain started worsening prompting visit to the ED. He denies any systemic symptoms of fevers, chills, myalgias, nausea/vomiting. The patient was in our ER about 8 days ago for alcohol intoxication and rehab placement. Since then, he has been in [facility] for alcohol rehabilitation. At the ED, BP was 156/87, and other vitals were stable. Patient was afebrile. He had no leukocytosis. CRP was normal.  ESR was slightly elevated at 41. Left foot x-ray showed edema around the great toe with possible small air. Blood cultures were sent. Patient was admitted under house team for further management. Patient received no medications or antibiotics at the ED. Past Medical History:  History reviewed. No pertinent past medical history. Past Surgical History:        Procedure Laterality Date    HUMERUS SURGERY Left 5/6/16    ORIF       Medications Prior to Admission:    Prior to Admission medications    Not on File       Allergies:  Patient has no known allergies. Social History:   TOBACCO:   reports that he has been smoking cigarettes. He has a 20.00 pack-year smoking history. He has never used smokeless tobacco.  ETOH:   reports previous alcohol use.      Family History:   History reviewed. No pertinent family history. REVIEW OF SYSTEMS:    · Constitutional: No fever, no chills, no change in weight; good appetite  · HEENT: No blurred vision, no ear problems, no sore throat, no rhinorrhea. · Respiratory: No cough, no sputum production, no pleuritic chest pain, no shortness of breath  · Cardiology: No angina, no dyspnea on exertion, no paroxysmal nocturnal dyspnea, no orthopnea, no palpitation, no leg swelling. · Gastroenterology: No dysphagia, no reflux; no abdominal pain, no nausea or vomiting; no constipation or diarrhea.  No hematochezia   · Genitourinary: No dysuria, no frequency, hesitancy; no hematuria  · Musculoskeletal: Left big toe wound/pain  · Neurology: no focal weakness in extremities, no slurred speech, no double vision, no tingling or numbness sensation  · Endocrinology: no temperature intolerance, no polyphagia, polydipsia or polyuria  · Hematology: no increased bleeding, no bruising, no lymphadenopathy  · Skin: no skin changes noticed by patient  · Psychology: no depressed mood, no suicidal ideation    Physical Exam   · Vitals: BP (!) 156/87   Pulse 67   Temp 98.5 °F (36.9 °C) (Oral)   Resp 18   Ht 6' 4\" (1.93 m)   Wt 220 lb (99.8 kg)   SpO2 96%   BMI 26.78 kg/m²     · General Appearance: alert and oriented to person, place and time, well developed and well- nourished, in no acute distress  · Skin: warm and dry, no rash or erythema  · Head: normocephalic and atraumatic  · Eyes: pupils equal, round, and reactive to light, extraocular eye movements intact, conjunctivae normal  · ENT: tympanic membrane, external ear and ear canal normal bilaterally, nose without deformity, nasal mucosa and turbinates normal without polyps  · Neck: supple and non-tender without mass, no thyromegaly or thyroid nodules, no cervical lymphadenopathy  · Pulmonary/Chest: clear to auscultation bilaterally- no wheezes, rales or rhonchi, normal air movement, no respiratory distress  · Cardiovascular: normal rate, regular rhythm, normal S1 and S2, no murmurs, rubs, clicks, or gallops, distal pulses intact, no carotid bruits  · Abdomen: soft, non-tender, non-distended, normal bowel sounds, no masses or organomegaly  · Extremities: no cyanosis, clubbing or edema  · Musculoskeletal: Left great toe wound with granulation tissue, erythema around the great toe, no crepitus, mild tenderness noted; dorsalis pedis and tibial pulses intact  · Neurologic: reflexes normal and symmetric, no cranial nerve deficit, gait, coordination and speech normal                 Labs and Imaging Studies   Basic Labs  Recent Labs     05/04/22 2208      K 4.4      CO2 26   BUN 22   CREATININE 0.7   GLUCOSE 103*   CALCIUM 9.1       Recent Labs     05/04/22 2208   WBC 5.4   RBC 3.71*   HGB 12.7   HCT 37.9   .2*   MCH 34.2   MCHC 33.5   RDW 12.7      MPV 10.6       Imaging Studies:     XR FOOT LEFT (MIN 3 VIEWS)    Result Date: 5/4/2022  EXAMINATION: THREE XRAY VIEWS OF THE LEFT FOOT 5/4/2022 10:36 pm COMPARISON: None. HISTORY: ORDERING SYSTEM PROVIDED HISTORY: great toe infection TECHNOLOGIST PROVIDED HISTORY: Reason for exam:->great toe infection What reading provider will be dictating this exam?->CRC FINDINGS: There is no evidence of acute fracture. There is normal alignment of the tarsometatarsal joints. No acute joint abnormality. No focal osseous lesion. Soft tissue edema adjacent to the distal tuft big toe with possible small amount of air. Soft tissue edema adjacent to the distal tuft great toe with possible small amount of air. No definitive evidence for osteomyelitis. Resident's Assessment and Plan     Luciana Boo is a 57 y.o. male with PMHx of alcohol abuse who presented due to left great toe pain. He is currently being managed for:    Assessment:  1.  Left great toe pain likely 2/2 dry gangrene - no leukocytosis, CRP normal, ESR mildly elevated, no systemic signs of infection  2. Elevated blood pressure likely 2/2 pain  3. History of alcohol abuse - last drink was 8 days ago; patient presented from [facility]  4. History of tobacco abuse - 1 pack/day for several years    Plan:  · Hold off on antibiotics for now as there are no signs of systemic infection  · Podiatry and wound care consulted  · Start Tylenol as needed for pain  · Observe blood pressure response with pain medication.   Start antihypertensives as appropriate  · Follow blood cultures  · Follow A1c and lipid panel      DVT prophylaxis/ GI prophylaxis: Lovenox/diet  Disposition: Admit to house team      Raj Santos MD, JARETT PGY-2   Attending physician: Dr. Dwayne Rondon  Case discussed with Dr. Tammie Hatfield

## 2022-05-05 NOTE — ED NOTES
Patient report called to nurse.  Patient placed in transport      [facility]  05/05/22 9766

## 2022-05-10 LAB
BLOOD CULTURE, ROUTINE: NORMAL
CULTURE, BLOOD 2: NORMAL

## 2022-11-22 ENCOUNTER — HOSPITAL ENCOUNTER (EMERGENCY)
Age: 62
Discharge: PSYCHIATRIC OR SUBSTANCE USE FACILITY | End: 2022-11-23
Payer: MEDICAID

## 2022-11-22 ENCOUNTER — APPOINTMENT (OUTPATIENT)
Dept: GENERAL RADIOLOGY | Age: 62
End: 2022-11-22
Payer: MEDICAID

## 2022-11-22 DIAGNOSIS — G89.29 CHRONIC PAIN OF LEFT KNEE: ICD-10-CM

## 2022-11-22 DIAGNOSIS — M17.12 OSTEOARTHRITIS OF LEFT KNEE, UNSPECIFIED OSTEOARTHRITIS TYPE: Primary | ICD-10-CM

## 2022-11-22 DIAGNOSIS — M25.562 CHRONIC PAIN OF LEFT KNEE: ICD-10-CM

## 2022-11-22 LAB
ACETAMINOPHEN LEVEL: 7 UG/ML (ref 10–30)
ALBUMIN SERPL-MCNC: 4.5 G/DL (ref 3.5–4.6)
ALP BLD-CCNC: 52 U/L (ref 35–104)
ALT SERPL-CCNC: 12 U/L (ref 0–41)
AMPHETAMINE SCREEN, URINE: NORMAL
ANION GAP SERPL CALCULATED.3IONS-SCNC: 15 MEQ/L (ref 9–15)
AST SERPL-CCNC: 15 U/L (ref 0–40)
BARBITURATE SCREEN URINE: NORMAL
BASOPHILS ABSOLUTE: 0.1 K/UL (ref 0–0.2)
BASOPHILS RELATIVE PERCENT: 1 %
BENZODIAZEPINE SCREEN, URINE: NORMAL
BILIRUB SERPL-MCNC: 0.4 MG/DL (ref 0.2–0.7)
BILIRUBIN URINE: NEGATIVE
BLOOD, URINE: NEGATIVE
BUN BLDV-MCNC: 12 MG/DL (ref 8–23)
CALCIUM SERPL-MCNC: 9.1 MG/DL (ref 8.5–9.9)
CANNABINOID SCREEN URINE: NORMAL
CHLORIDE BLD-SCNC: 94 MEQ/L (ref 95–107)
CHOLESTEROL, TOTAL: 166 MG/DL (ref 0–199)
CLARITY: CLEAR
CO2: 24 MEQ/L (ref 20–31)
COCAINE METABOLITE SCREEN URINE: NORMAL
COLOR: YELLOW
CREAT SERPL-MCNC: 0.67 MG/DL (ref 0.7–1.2)
EOSINOPHILS ABSOLUTE: 0.1 K/UL (ref 0–0.7)
EOSINOPHILS RELATIVE PERCENT: 1.2 %
ETHANOL PERCENT: 0.07 G/DL
ETHANOL: 77 MG/DL (ref 0–0.08)
FENTANYL SCREEN, URINE: NORMAL
GFR SERPL CREATININE-BSD FRML MDRD: >60 ML/MIN/{1.73_M2}
GLOBULIN: 3.1 G/DL (ref 2.3–3.5)
GLUCOSE BLD-MCNC: 86 MG/DL (ref 70–99)
GLUCOSE URINE: NEGATIVE MG/DL
HCT VFR BLD CALC: 39.3 % (ref 42–52)
HDLC SERPL-MCNC: 49 MG/DL (ref 40–59)
HEMOGLOBIN: 13.7 G/DL (ref 14–18)
KETONES, URINE: NEGATIVE MG/DL
LDL CHOLESTEROL CALCULATED: 96 MG/DL (ref 0–129)
LEUKOCYTE ESTERASE, URINE: NEGATIVE
LYMPHOCYTES ABSOLUTE: 1.7 K/UL (ref 1–4.8)
LYMPHOCYTES RELATIVE PERCENT: 28.2 %
Lab: NORMAL
MCH RBC QN AUTO: 32.7 PG (ref 27–31.3)
MCHC RBC AUTO-ENTMCNC: 34.7 % (ref 33–37)
MCV RBC AUTO: 94.3 FL (ref 79–92.2)
METHADONE SCREEN, URINE: NORMAL
MONOCYTES ABSOLUTE: 0.4 K/UL (ref 0.2–0.8)
MONOCYTES RELATIVE PERCENT: 6.6 %
NEUTROPHILS ABSOLUTE: 3.8 K/UL (ref 1.4–6.5)
NEUTROPHILS RELATIVE PERCENT: 63 %
NITRITE, URINE: NEGATIVE
OPIATE SCREEN URINE: NORMAL
OXYCODONE URINE: NORMAL
PDW BLD-RTO: 14.1 % (ref 11.5–14.5)
PH UA: 5 (ref 5–9)
PHENCYCLIDINE SCREEN URINE: NORMAL
PLATELET # BLD: 204 K/UL (ref 130–400)
POTASSIUM SERPL-SCNC: 3.6 MEQ/L (ref 3.4–4.9)
PROPOXYPHENE SCREEN: NORMAL
PROTEIN UA: NEGATIVE MG/DL
RBC # BLD: 4.17 M/UL (ref 4.7–6.1)
SALICYLATE, SERUM: <0.3 MG/DL (ref 15–30)
SARS-COV-2, NAAT: NOT DETECTED
SODIUM BLD-SCNC: 133 MEQ/L (ref 135–144)
SPECIFIC GRAVITY UA: 1.01 (ref 1–1.03)
TOTAL CK: 158 U/L (ref 0–190)
TOTAL PROTEIN: 7.6 G/DL (ref 6.3–8)
TRIGL SERPL-MCNC: 105 MG/DL (ref 0–150)
TSH SERPL DL<=0.05 MIU/L-ACNC: 2.37 UIU/ML (ref 0.44–3.86)
URINE REFLEX TO CULTURE: NORMAL
UROBILINOGEN, URINE: 0.2 E.U./DL
WBC # BLD: 6.1 K/UL (ref 4.8–10.8)

## 2022-11-22 PROCEDURE — 81003 URINALYSIS AUTO W/O SCOPE: CPT

## 2022-11-22 PROCEDURE — 80307 DRUG TEST PRSMV CHEM ANLYZR: CPT

## 2022-11-22 PROCEDURE — 6360000002 HC RX W HCPCS: Performed by: PHYSICIAN ASSISTANT

## 2022-11-22 PROCEDURE — 73562 X-RAY EXAM OF KNEE 3: CPT

## 2022-11-22 PROCEDURE — 80053 COMPREHEN METABOLIC PANEL: CPT

## 2022-11-22 PROCEDURE — 82550 ASSAY OF CK (CPK): CPT

## 2022-11-22 PROCEDURE — 87635 SARS-COV-2 COVID-19 AMP PRB: CPT

## 2022-11-22 PROCEDURE — 99285 EMERGENCY DEPT VISIT HI MDM: CPT

## 2022-11-22 PROCEDURE — 80143 DRUG ASSAY ACETAMINOPHEN: CPT

## 2022-11-22 PROCEDURE — 85025 COMPLETE CBC W/AUTO DIFF WBC: CPT

## 2022-11-22 PROCEDURE — 93005 ELECTROCARDIOGRAM TRACING: CPT | Performed by: PHYSICIAN ASSISTANT

## 2022-11-22 PROCEDURE — 80179 DRUG ASSAY SALICYLATE: CPT

## 2022-11-22 PROCEDURE — 96372 THER/PROPH/DIAG INJ SC/IM: CPT

## 2022-11-22 PROCEDURE — 6370000000 HC RX 637 (ALT 250 FOR IP): Performed by: PHYSICIAN ASSISTANT

## 2022-11-22 PROCEDURE — 84443 ASSAY THYROID STIM HORMONE: CPT

## 2022-11-22 PROCEDURE — 80061 LIPID PANEL: CPT

## 2022-11-22 PROCEDURE — 36415 COLL VENOUS BLD VENIPUNCTURE: CPT

## 2022-11-22 PROCEDURE — 82077 ASSAY SPEC XCP UR&BREATH IA: CPT

## 2022-11-22 RX ORDER — KETOROLAC TROMETHAMINE 30 MG/ML
30 INJECTION, SOLUTION INTRAMUSCULAR; INTRAVENOUS ONCE
Status: COMPLETED | OUTPATIENT
Start: 2022-11-22 | End: 2022-11-22

## 2022-11-22 RX ORDER — ACETAMINOPHEN 500 MG
1000 TABLET ORAL
Status: COMPLETED | OUTPATIENT
Start: 2022-11-22 | End: 2022-11-22

## 2022-11-22 RX ORDER — SERTRALINE HYDROCHLORIDE 100 MG/1
1 TABLET, FILM COATED ORAL DAILY
COMMUNITY
Start: 2022-10-31

## 2022-11-22 RX ORDER — DOXYCYCLINE HYCLATE 100 MG/1
1 CAPSULE ORAL 2 TIMES DAILY
COMMUNITY
Start: 2022-11-15

## 2022-11-22 RX ORDER — TRAZODONE HYDROCHLORIDE 50 MG/1
1 TABLET ORAL NIGHTLY
COMMUNITY
Start: 2022-11-15

## 2022-11-22 RX ORDER — IBUPROFEN 600 MG/1
600 TABLET ORAL EVERY 6 HOURS PRN
Qty: 120 TABLET | Refills: 0 | Status: SHIPPED | OUTPATIENT
Start: 2022-11-22

## 2022-11-22 RX ORDER — METHION/INOS/CHOL BT/B COM/LIV 110MG-86MG
1 CAPSULE ORAL DAILY
COMMUNITY
Start: 2022-10-31

## 2022-11-22 RX ORDER — ACETAMINOPHEN 500 MG
500-1000 TABLET ORAL EVERY 6 HOURS PRN
Qty: 50 TABLET | Refills: 0 | Status: SHIPPED | OUTPATIENT
Start: 2022-11-22

## 2022-11-22 RX ORDER — MIRTAZAPINE 15 MG/1
1 TABLET, FILM COATED ORAL NIGHTLY
COMMUNITY
Start: 2022-11-15

## 2022-11-22 RX ORDER — AMOXICILLIN AND CLAVULANATE POTASSIUM 875; 125 MG/1; MG/1
1 TABLET, FILM COATED ORAL 2 TIMES DAILY
COMMUNITY
Start: 2022-11-15

## 2022-11-22 RX ORDER — BUSPIRONE HYDROCHLORIDE 5 MG/1
1 TABLET ORAL 2 TIMES DAILY
COMMUNITY
Start: 2022-11-15

## 2022-11-22 RX ADMIN — KETOROLAC TROMETHAMINE 30 MG: 30 INJECTION, SOLUTION INTRAMUSCULAR at 14:42

## 2022-11-22 RX ADMIN — ACETAMINOPHEN 1000 MG: 500 TABLET ORAL at 14:42

## 2022-11-22 ASSESSMENT — PAIN - FUNCTIONAL ASSESSMENT
PAIN_FUNCTIONAL_ASSESSMENT: 0-10
PAIN_FUNCTIONAL_ASSESSMENT: ACTIVITIES ARE NOT PREVENTED

## 2022-11-22 ASSESSMENT — PAIN DESCRIPTION - ORIENTATION
ORIENTATION: LEFT
ORIENTATION: LEFT

## 2022-11-22 ASSESSMENT — ENCOUNTER SYMPTOMS: BACK PAIN: 0

## 2022-11-22 ASSESSMENT — PAIN DESCRIPTION - LOCATION
LOCATION: KNEE
LOCATION: KNEE

## 2022-11-22 ASSESSMENT — PAIN SCALES - GENERAL
PAINLEVEL_OUTOF10: 8
PAINLEVEL_OUTOF10: 10

## 2022-11-22 ASSESSMENT — PAIN DESCRIPTION - DESCRIPTORS: DESCRIPTORS: ACHING

## 2022-11-22 ASSESSMENT — PATIENT HEALTH QUESTIONNAIRE - PHQ9: SUM OF ALL RESPONSES TO PHQ QUESTIONS 1-9: 27

## 2022-11-22 NOTE — ED NOTES
Provisional Diagnosis:       Major Depression  ETOH Dependence    Psychosocial and Contextual Factors:      Patient is currently homeless in [place] and staying at [address] [place] [address]. He had been living in [place], [place] until he left his apartment two months ago after he was \"tired of dealing with bed bugs\" and was transferred to [facility] for treatment. Patient has never been  and has no children. He currently receives social security disability. He has not worked for the past 4 years and previously worked at [employer]. C-SSRS Summary:      Patient: Patient controlled and cooperative  Family: None present  Agency: Patient states he is open with Mike Marlow in [facility] and sees  Bo Stoner. Substance Abuse: Admits to daily ETOH and reports he drinks a 12 pack. Denies any other substance use. States he is on the waiting list for rehab at [facility] in [place]. Verbalizes he wants sobriety and his longest time sober was 2 years ago in the [place] 2000's. \"    Present Suicidal Behavior:      C-SSRS Suicide Screening - 1) Within the past month, have you wished you were dead or wished you could go to sleep and not wake up? : Yes  2) Have you actually had any thoughts of killing yourself? : Yes  3) Have you been thinking about how you might kill yourself? : Yes  4) Have you had these thoughts and had some intention of acting on them? : Yes  5) Have you started to work out or worked out the details of how to kill yourself? Do you intend to carry out this plan? : Yes  6) Have you ever done anything, started to do anything, or prepared to do anything to end your life?: Yes  Did this occur within the past 3 months? : No  Risk of Suicide: High Risk       Verbal: Patient admits to present SI with plan to jump in front of a train, a semi or stab himself with a pen.      Attempt: Denies current attempt    Past Suicidal Behavior:       Verbal: Patient admits to previous suicide attempts    Attempt: Admits to an attempted overdose of Trazodone and Zoloft one month ago which he did not receive treatment. Multiple previous attempts with previous attempt by drinking antifreeze four years ago resulting in requiring dialysis at that time. Self-Injurious/Self-Mutilation: Denies      Violence Current or Past/Criminal History: Denies any history of violence, admits to armed robbery in 0 and spent 18 months in penitentiary. Admits to multiple charges for disorderly conduct and current charge with court on Friday in [facility] for open container. Trauma Identified:  Denies    Protective Factors:      Has SSI  Has a       Risk Factors:      Homeless  Substance Abuse  Non-compliance with medications and treatment      Clinical Summary:      Patient presents to the ED for complaints of knee pain for the past two years. After patient was seen by the ED provider and discharge discussed patient stated he was going to \"kill himself\" if he was not cabbed to [place]. States he received treatment at [facility] one month ago and has been in [place] since the discharge. Patient cooperative with assessment and is able to answer assessment questions appropriately with organized thoughts. Admits to current SI with multiple plans to jump in front of a train, a semi-truck or stab himself in the heart with a pen. States \"if you don't believe me that I will do it, just call my . \" Denies any self harm or suicide attempt prior to arrival. Denies HI and denies A/V hallucinations. Patient appears depressed with flat affect and poor eye contact. Speech spontaneous and slow. Admits to recent discharge from [facility] and patient is evasive when answering if he is currently taking his medications. Patient states he has been having difficulty sleeping and denies any change in appetite.          Level of Care Disposition:      Per Dr. Ant Engle, RN  11/22/22 [place] [address], RN  11/22/22 1912

## 2022-11-22 NOTE — ED NOTES
Splint applied to patient. MSP's intact. Pt educated on splint use. Verbalizes an understanding.      Sherri Woodall RN  11/22/22 4333

## 2022-11-22 NOTE — ED NOTES
Multiple attempts made to contact patient's home pharmacy to verify home medications without success.       Izabella Portillo RN  11/22/22 1957

## 2022-11-22 NOTE — ED NOTES
Call placed to lab and spoke with Durga Hall. CBC instrument is down and lab should result shortly.       Bibi Cantu RN  11/22/22 6056

## 2022-11-22 NOTE — ED NOTES
Lab notified of new orders. [employer] notified of patient belongings.       Emily Sears RN  11/22/22 5832

## 2022-11-22 NOTE — ED PROVIDER NOTES
[facility] ED  eMERGENCY dEPARTMENTeNCOUnter      Pt Name: Mary Jane Danielle  MRN: [id number]  [place] 1960  Date ofevaluation: 11/22/2022  Provider: Estrada Zheng PA-C    [address] [place] [address]       Chief Complaint   Patient presents with    Knee Pain     Left knee pain          HISTORY OF PRESENT ILLNESS   (Location/Symptom, Timing/Onset,Context/Setting, Quality, Duration, Modifying Factors, Severity)  Note limiting factors. This is a 78-year-old male presenting to the emergency department for evaluation of left knee pain that has been bothering him for the past 2 years. He states that it has been constantly sore, however a few weeks ago he did fall and seem to have aggravated the pain much worse. Patient reports the pain is a 10/10 at its worst, currently an 8/10, aching and sore, aggravated with palpation, movement and weightbearing, no reported alleviating factors, not associated with distal paresthesias, swelling, recent illness, ipsilateral hip or ankle pain or any contralateral lower extremity pain. NursingNotes were reviewed. REVIEW OF SYSTEMS    (2-9 systems for level 4, 10 or more for level 5)     Review of Systems   Musculoskeletal:  Positive for arthralgias and gait problem. Negative for back pain and joint swelling. Skin: Negative. Allergic/Immunologic: Negative for immunocompromised state. Neurological:  Negative for weakness and numbness. Hematological:  Does not bruise/bleed easily. All other systems reviewed and are negative. Except as noted above the remainder of the review of systems was reviewed and negative. PAST MEDICAL HISTORY   History reviewed. No pertinent past medical history. SURGICALHISTORY       Past Surgical History:   Procedure Laterality Date    HUMERUS SURGERY Left 5/6/16    ORIF         CURRENT MEDICATIONS       Previous Medications    No medications on file            Patient has no known allergies.     FAMILY HISTORY History reviewed. No pertinent family history. SOCIAL HISTORY       Social History     Socioeconomic History    Marital status: Single     Spouse name: None    Number of children: None    Years of education: None    Highest education level: None   Tobacco Use    Smoking status: Every Day     Packs/day: 1.00     Years: 20.00     Pack years: 20.00     Types: Cigarettes    Smokeless tobacco: Never   Substance and Sexual Activity    Alcohol use: Not Currently     Comment: states that he has not had a drink in 9 days     Drug use: Never       SCREENINGS    [place] Coma Scale  Eye Opening: Spontaneous  Best Verbal Response: Oriented  Best Motor Response: Obeys commands  [place] Coma Scale Score: 15        PHYSICAL EXAM    (up to 7 for level 4, 8 or more for level 5)     ED Triage Vitals [11/22/22 1430]   BP Temp Temp Source Heart Rate Resp SpO2 Height Weight   109/61 98.3 °F (36.8 °C) Temporal 81 18 96 % 6' 4\" (1.93 m) 215 lb (97.5 kg)       Physical Exam  Vitals and nursing note reviewed. Constitutional:       General: He is not in acute distress. Appearance: Normal appearance. He is normal weight. He is not ill-appearing, toxic-appearing or diaphoretic. HENT:      Head: Normocephalic and atraumatic. Nose: Nose normal.      Mouth/Throat:      Mouth: Mucous membranes are moist.      Pharynx: Oropharynx is clear. Eyes:      Extraocular Movements: Extraocular movements intact. Conjunctiva/sclera: Conjunctivae normal.   Pulmonary:      Effort: Pulmonary effort is normal. No respiratory distress. Musculoskeletal:         General: Tenderness present. No swelling, deformity or signs of injury. Normal range of motion. Cervical back: Normal range of motion and neck supple. Lumbar back: Normal.      Left hip: Normal.      Left knee: Bony tenderness present. No swelling, lacerations or crepitus. Normal range of motion. Tenderness present over the medial joint line and lateral joint line.  No patellar tendon tenderness. No LCL laxity, MCL laxity, ACL laxity or PCL laxity. Normal pulse. Instability Tests: Anterior drawer test negative. Posterior drawer test negative. Anterior Lachman test negative. Medial Casi test negative and lateral Casi test negative. Left foot: Normal capillary refill. Normal pulse. Skin:     General: Skin is warm and dry. Capillary Refill: Capillary refill takes less than 2 seconds. Neurological:      General: No focal deficit present. Mental Status: He is alert and oriented to person, place, and time. Psychiatric:         Mood and Affect: Mood normal.         Behavior: Behavior normal.       RESULTS     RADIOLOGY:   Non-plain filmimages such as CT, Ultrasound and MRI are read by the radiologist.        Interpretation per the Radiologist below, if available at the time ofthis note:    XR KNEE LEFT (3 VIEWS)   Final Result   Degenerative changes. No acute bony abnormality or effusion             LABS:  Labs Reviewed - No data to display    All other labs were within normal range or not returned as of this dictation. EMERGENCY DEPARTMENT COURSE and DIFFERENTIAL DIAGNOSIS/MDM:   Vitals:    Vitals:    11/22/22 1430   BP: 109/61   Pulse: 81   Resp: 18   Temp: 98.3 °F (36.8 °C)   TempSrc: Temporal   SpO2: 96%   Weight: 215 lb (97.5 kg)   Height: 6' 4\" (1.93 m)            MDM  Number of Diagnoses or Management Options  Chronic pain of left knee  Osteoarthritis of left knee, unspecified osteoarthritis type  Diagnosis management comments: 58-year-old male presents for chronic left knee pain. He does state that he fell a few weeks ago and reaggravated his chronic left knee pain. On exam he does have tenderness about the entirety of the knee but without any obvious deformity or obvious laxity. X-ray was obtained and did show a moderate to severe amount of degenerative disease. No obvious fractures or malalignment.   He was given supportive medications in the ED and discharged with the same. He was instructed to follow-up with orthopedics and return to the ED for any new or worsening symptoms. Considered but have low suspicion for fracture, dislocation, septic arthritis, or gouty arthritis. REASSESSMENT              PROCEDURES:  Unless otherwise noted below, none     Procedures    FINAL IMPRESSION      1. Osteoarthritis of left knee, unspecified osteoarthritis type    2.  Chronic pain of left knee          DISPOSITION/PLAN   DISPOSITION Decision To Discharge 11/22/2022 03:31:36 PM      PATIENT REFERREDTO:  [address]  [address] [place] [address] [phone]  Schedule an appointment as soon as possible for a visit   for a second opinion, regarding your knee pain    Bal Reina MD  [address]:  New Prescriptions    ACETAMINOPHEN (TYLENOL) 500 MG TABLET    Take 1-2 tablets by mouth every 6 hours as needed for Pain    DICLOFENAC SODIUM (VOLTAREN) 1 % GEL    Apply 2-4 g topically 4 times daily    IBUPROFEN (IBU) 600 MG TABLET    Take 1 tablet by mouth every 6 hours as needed for Pain          (Please note that portions of this note were completed with a voice recognition program.  Efforts were made to edit the dictations but occasionally words are mis-transcribed.)    Yoselin Parker PA-C (electronically signed)  Attending Emergency Physician       Yoselin Parker PA-C  11/22/22 6389

## 2022-11-22 NOTE — ED NOTES
Pt states he is now suicidal and wants to die. Provider aware and pt taken to [facility] for evaluation.       Adriano Ojeda RN  11/22/22 9115

## 2022-11-22 NOTE — ED NOTES
Changed into [facility] clothing with dry scaly skin noted around left great toe. Patient states, \"I'll probably lose the nail soon\". No contraband found @ this time. Mood irritable. Covid obtained. Tolerated procedure fair. Urine specimen obtained. Covid & urine sent to lab earlier. Oriented to the SILVANO. Verbalizes understanding.      Abilio York RN  11/22/22 6401

## 2022-11-23 VITALS
WEIGHT: 215 LBS | SYSTOLIC BLOOD PRESSURE: 127 MMHG | OXYGEN SATURATION: 95 % | HEART RATE: 57 BPM | TEMPERATURE: 97.9 F | RESPIRATION RATE: 16 BRPM | HEIGHT: 76 IN | BODY MASS INDEX: 26.18 KG/M2 | DIASTOLIC BLOOD PRESSURE: 79 MMHG

## 2022-11-23 LAB
EKG ATRIAL RATE: 67 BPM
EKG P AXIS: 82 DEGREES
EKG P-R INTERVAL: 170 MS
EKG Q-T INTERVAL: 452 MS
EKG QRS DURATION: 100 MS
EKG QTC CALCULATION (BAZETT): 477 MS
EKG R AXIS: 60 DEGREES
EKG T AXIS: 67 DEGREES
EKG VENTRICULAR RATE: 67 BPM

## 2022-11-23 PROCEDURE — 93010 ELECTROCARDIOGRAM REPORT: CPT | Performed by: INTERNAL MEDICINE

## 2022-11-23 NOTE — ED NOTES
Called [facility] talked with Tong Watson will be about 30 minutes before      Alicia Perez RN  11/23/22 5045

## 2022-11-23 NOTE — ED NOTES
Patient resting , respirations even and unlabored. No distress noted.       Roseanna Matthews RN  11/23/22 6416

## 2022-11-23 NOTE — ED NOTES
Pt is in bed area quiet and cooperative with no problems and no C/O any kind expressed.      Mary Valencia RN  11/23/22 9831

## 2022-11-23 NOTE — ED NOTES
Call from [facility] at [facility], they can be here in the next hour to transport pt to [facility], RN  11/23/22 0030

## 2022-11-23 NOTE — ED NOTES
To generations via squad. Notified [facility] [employer] to put belongings in squad. Jerri Bhagat  [facility]  11/23/22 9681

## 2022-11-23 NOTE — ED NOTES
Pt is asleep in bed area with even respirations no problems and no C/O any kind expressed     Ap Taveras RN  11/23/22 2606

## 2022-11-23 NOTE — ED NOTES
Accepting -Dr. Kimi Castillo  Nurse to nurse [phone]  Room 106-[facility]  11/22/22 5669

## 2022-11-23 NOTE — ED NOTES
Call from [place] have to cancel transport d/t crew getting sick.       Dominick Mtz RN  11/23/22 9329

## 2022-11-23 NOTE — ED NOTES
Patient resting comfortably in bed. Respirations even and unlabored. No distress noted.       Talon Dyer RN  11/22/22 4621

## 2022-11-23 NOTE — ED NOTES
Patient ambulated to  and returned to bed. Pt resting , respirations even and unlabored. No distress noted.       Delano Rubio RN  11/23/22 5123

## 2022-11-23 NOTE — ED NOTES
Update on transport given to [employer] at [facility].       Katie Yu RN  11/23/22 1010

## 2022-11-23 NOTE — ED NOTES
Thomas Feliciano, Accepted at [facility], needs pink sent with generations on it. Dr Deena Knowles is accepting      Will give RM and N2N once pink slip is received.       Sandy Bowman RN  11/22/22 9046

## 2022-11-23 NOTE — ED NOTES
Patient ambulated to  and returned to bed. Resting comfortably. Respirations even and unlabored. No s/s of distress noted.       Brittany Donahue, VARSHA  11/23/22 0302       Brittany Donahue RN  11/23/22 6742

## 2022-11-23 NOTE — ED NOTES
Pt updated on POC, pt replied with a grunt and grimace, informed when we know more he would;d know more, pt agreeable.       Toya Wade RN  11/22/22 3504

## 2022-11-23 NOTE — ED NOTES
Spoke with Dr Bernardo Casanova, attempt to place pt out by [facility] a sthats where he is linked with services.      Gracia Howe RN  11/22/22 2051

## 2022-11-23 NOTE — ED NOTES
Entered in error  [employer]. Sabine Plump  11/23/22 1200 [facility]  Sabine Plump  11/23/22 1100

## 2022-11-23 NOTE — ED NOTES
Spoke with Mara Powell at [facility], they have male beds,ok to send packet for review.       Mitchell Mckeon RN  11/22/22 2052

## 2022-11-23 NOTE — ED NOTES
Patient resting comfortably. Respirations even and unlabored. No distress noted at this time. Will continue to monitor.       Dariana Mcgowan RN  11/22/22 1943

## 2022-11-23 NOTE — ED NOTES
Patient resting comfortably with eyes closed. Respirations even and unlabored No distress noted.       Emely Muniz RN  11/23/22 4874

## 2022-11-23 NOTE — ED NOTES
Patient resting in bed comfortably. Respirations even and unlabored.  No distress noted     Lyn Burch RN  11/22/22 2059

## 2023-10-21 ENCOUNTER — HOSPITAL ENCOUNTER (EMERGENCY)
Facility: HOSPITAL | Age: 63
Discharge: PSYCHIATRIC OR SUBSTANCE USE FACILITY | End: 2023-10-22
Attending: EMERGENCY MEDICINE
Payer: MEDICAID

## 2023-10-21 VITALS
SYSTOLIC BLOOD PRESSURE: 139 MMHG | TEMPERATURE: 99.1 F | OXYGEN SATURATION: 93 % | WEIGHT: 215 LBS | HEART RATE: 87 BPM | BODY MASS INDEX: 26.18 KG/M2 | DIASTOLIC BLOOD PRESSURE: 68 MMHG | RESPIRATION RATE: 16 BRPM | HEIGHT: 76 IN

## 2023-10-21 DIAGNOSIS — R45.851 DEPRESSION WITH SUICIDAL IDEATION: Primary | ICD-10-CM

## 2023-10-21 DIAGNOSIS — F32.A DEPRESSION WITH SUICIDAL IDEATION: Primary | ICD-10-CM

## 2023-10-21 LAB
ALBUMIN SERPL BCP-MCNC: 4.5 G/DL (ref 3.4–5)
ALP SERPL-CCNC: 46 U/L (ref 33–136)
ALT SERPL W P-5'-P-CCNC: 76 U/L (ref 10–52)
AMORPH CRY #/AREA UR COMP ASSIST: ABNORMAL /HPF
AMPHETAMINES UR QL SCN: ABNORMAL
ANION GAP SERPL CALC-SCNC: 17 MMOL/L (ref 10–20)
APAP SERPL-MCNC: <10 UG/ML
APPEARANCE UR: ABNORMAL
AST SERPL W P-5'-P-CCNC: 82 U/L (ref 9–39)
BARBITURATES UR QL SCN: ABNORMAL
BASOPHILS # BLD AUTO: 0.07 X10*3/UL (ref 0–0.1)
BASOPHILS NFR BLD AUTO: 1.3 %
BENZODIAZ UR QL SCN: ABNORMAL
BILIRUB SERPL-MCNC: 0.9 MG/DL (ref 0–1.2)
BILIRUB UR STRIP.AUTO-MCNC: NEGATIVE MG/DL
BUN SERPL-MCNC: 22 MG/DL (ref 6–23)
BZE UR QL SCN: ABNORMAL
CALCIUM SERPL-MCNC: 9.1 MG/DL (ref 8.6–10.3)
CANNABINOIDS UR QL SCN: ABNORMAL
CHLORIDE SERPL-SCNC: 100 MMOL/L (ref 98–107)
CO2 SERPL-SCNC: 22 MMOL/L (ref 21–32)
COLOR UR: YELLOW
CREAT SERPL-MCNC: 1.02 MG/DL (ref 0.5–1.3)
EOSINOPHIL # BLD AUTO: 0 X10*3/UL (ref 0–0.7)
EOSINOPHIL NFR BLD AUTO: 0 %
ERYTHROCYTE [DISTWIDTH] IN BLOOD BY AUTOMATED COUNT: 14.2 % (ref 11.5–14.5)
ETHANOL SERPL-MCNC: <10 MG/DL
FENTANYL+NORFENTANYL UR QL SCN: ABNORMAL
GFR SERPL CREATININE-BSD FRML MDRD: 83 ML/MIN/1.73M*2
GLUCOSE SERPL-MCNC: 154 MG/DL (ref 74–99)
GLUCOSE UR STRIP.AUTO-MCNC: NEGATIVE MG/DL
HCT VFR BLD AUTO: 39.2 % (ref 41–52)
HGB BLD-MCNC: 14.1 G/DL (ref 13.5–17.5)
HYALINE CASTS #/AREA URNS AUTO: ABNORMAL /LPF
IMM GRANULOCYTES # BLD AUTO: 0.01 X10*3/UL (ref 0–0.7)
IMM GRANULOCYTES NFR BLD AUTO: 0.2 % (ref 0–0.9)
KETONES UR STRIP.AUTO-MCNC: ABNORMAL MG/DL
LEUKOCYTE ESTERASE UR QL STRIP.AUTO: NEGATIVE
LYMPHOCYTES # BLD AUTO: 0.77 X10*3/UL (ref 1.2–4.8)
LYMPHOCYTES NFR BLD AUTO: 14.3 %
MCH RBC QN AUTO: 34.3 PG (ref 26–34)
MCHC RBC AUTO-ENTMCNC: 36 G/DL (ref 32–36)
MCV RBC AUTO: 95 FL (ref 80–100)
MONOCYTES # BLD AUTO: 0.37 X10*3/UL (ref 0.1–1)
MONOCYTES NFR BLD AUTO: 6.9 %
MUCOUS THREADS #/AREA URNS AUTO: ABNORMAL /LPF
NEUTROPHILS # BLD AUTO: 4.15 X10*3/UL (ref 1.2–7.7)
NEUTROPHILS NFR BLD AUTO: 77.3 %
NITRITE UR QL STRIP.AUTO: NEGATIVE
NRBC BLD-RTO: 0 /100 WBCS (ref 0–0)
OPIATES UR QL SCN: ABNORMAL
OXYCODONE+OXYMORPHONE UR QL SCN: ABNORMAL
PCP UR QL SCN: ABNORMAL
PH UR STRIP.AUTO: 5 [PH]
PLATELET # BLD AUTO: 180 X10*3/UL (ref 150–450)
PMV BLD AUTO: 10.2 FL (ref 7.5–11.5)
POTASSIUM SERPL-SCNC: 4.2 MMOL/L (ref 3.5–5.3)
PROT SERPL-MCNC: 7.4 G/DL (ref 6.4–8.2)
PROT UR STRIP.AUTO-MCNC: ABNORMAL MG/DL
RBC # BLD AUTO: 4.11 X10*6/UL (ref 4.5–5.9)
RBC # UR STRIP.AUTO: NEGATIVE /UL
RBC #/AREA URNS AUTO: ABNORMAL /HPF
SALICYLATES SERPL-MCNC: <3 MG/DL
SARS-COV-2 RNA RESP QL NAA+PROBE: NOT DETECTED
SODIUM SERPL-SCNC: 135 MMOL/L (ref 136–145)
SP GR UR STRIP.AUTO: 1.03
TRANS CELLS #/AREA UR COMP ASSIST: ABNORMAL /HPF
UROBILINOGEN UR STRIP.AUTO-MCNC: 2 MG/DL
WBC # BLD AUTO: 5.4 X10*3/UL (ref 4.4–11.3)
WBC #/AREA URNS AUTO: ABNORMAL /HPF

## 2023-10-21 PROCEDURE — 80320 DRUG SCREEN QUANTALCOHOLS: CPT | Performed by: NURSE PRACTITIONER

## 2023-10-21 PROCEDURE — 81003 URINALYSIS AUTO W/O SCOPE: CPT | Performed by: NURSE PRACTITIONER

## 2023-10-21 PROCEDURE — 80329 ANALGESICS NON-OPIOID 1 OR 2: CPT | Performed by: NURSE PRACTITIONER

## 2023-10-21 PROCEDURE — 80307 DRUG TEST PRSMV CHEM ANLYZR: CPT | Performed by: NURSE PRACTITIONER

## 2023-10-21 PROCEDURE — 80053 COMPREHEN METABOLIC PANEL: CPT | Performed by: NURSE PRACTITIONER

## 2023-10-21 PROCEDURE — 85025 COMPLETE CBC W/AUTO DIFF WBC: CPT | Performed by: NURSE PRACTITIONER

## 2023-10-21 PROCEDURE — 36415 COLL VENOUS BLD VENIPUNCTURE: CPT | Performed by: NURSE PRACTITIONER

## 2023-10-21 PROCEDURE — 99285 EMERGENCY DEPT VISIT HI MDM: CPT

## 2023-10-21 PROCEDURE — 87635 SARS-COV-2 COVID-19 AMP PRB: CPT | Performed by: NURSE PRACTITIONER

## 2023-10-21 ASSESSMENT — PAIN DESCRIPTION - FREQUENCY: FREQUENCY: CONSTANT/CONTINUOUS

## 2023-10-21 ASSESSMENT — COLUMBIA-SUICIDE SEVERITY RATING SCALE - C-SSRS
6. HAVE YOU EVER DONE ANYTHING, STARTED TO DO ANYTHING, OR PREPARED TO DO ANYTHING TO END YOUR LIFE?: YES
6. HAVE YOU EVER DONE ANYTHING, STARTED TO DO ANYTHING, OR PREPARED TO DO ANYTHING TO END YOUR LIFE?: YES
2. HAVE YOU ACTUALLY HAD ANY THOUGHTS OF KILLING YOURSELF?: YES
4. HAVE YOU HAD THESE THOUGHTS AND HAD SOME INTENTION OF ACTING ON THEM?: YES
1. IN THE PAST MONTH, HAVE YOU WISHED YOU WERE DEAD OR WISHED YOU COULD GO TO SLEEP AND NOT WAKE UP?: NO
5. HAVE YOU STARTED TO WORK OUT OR WORKED OUT THE DETAILS OF HOW TO KILL YOURSELF? DO YOU INTEND TO CARRY OUT THIS PLAN?: NO

## 2023-10-21 ASSESSMENT — PAIN DESCRIPTION - LOCATION: LOCATION: BACK

## 2023-10-21 ASSESSMENT — PAIN - FUNCTIONAL ASSESSMENT: PAIN_FUNCTIONAL_ASSESSMENT: 0-10

## 2023-10-21 ASSESSMENT — PAIN DESCRIPTION - ORIENTATION: ORIENTATION: LOWER

## 2023-10-21 ASSESSMENT — PAIN DESCRIPTION - PAIN TYPE: TYPE: CHRONIC PAIN

## 2023-10-21 ASSESSMENT — PAIN SCALES - GENERAL: PAINLEVEL_OUTOF10: 4

## 2023-10-21 NOTE — ED PROVIDER NOTES
Chief Complaint   Patient presents with   • Weakness, Gen       HPI       62 year old male presents to the Emergency Department today complaining of a 1 week history of depression with suicidal ideation and plan. Notes that he wanted to lie [place] on the railroad tracks. Has felt generally weak. Dinks a least a pint of vodka with beer daily. Last drink was this am. Denies any associated fever, chills, headache, neck pain, chest pain, shortness of breath, abdominal pain, nausea, vomiting, diarrhea, constipation, or urinary symptoms.       History provided by:  Patient             Patient History   Past Medical History:   Diagnosis Date   • Alcohol abuse, in remission     History of alcohol abuse     Past Surgical History:   Procedure Laterality Date   • OTHER SURGICAL HISTORY  08/26/2019    Tonsillectomy with adenoidectomy     No family history on file.  Social History     Tobacco Use   • Smoking status: Every Day     Packs/day: 1     Types: Cigarettes   • Smokeless tobacco: Never   Substance Use Topics   • Alcohol use: Yes   • Drug use: Never           Physical Exam  Constitutional:       Appearance: Normal appearance.   HENT:      Head: Normocephalic.      Right Ear: Tympanic membrane, ear canal and external ear normal.      Left Ear: Tympanic membrane, ear canal and external ear normal.      Nose: Nose normal.      Mouth/Throat:      Mouth: Mucous membranes are moist.      Pharynx: Oropharynx is clear. No oropharyngeal exudate or posterior oropharyngeal erythema.   Eyes:      Conjunctiva/sclera: Conjunctivae normal.      Pupils: Pupils are equal, round, and reactive to light.   Cardiovascular:      Rate and Rhythm: Normal rate and regular rhythm.      Pulses:           Radial pulses are 3+ on the right side and 3+ on the left side.        Dorsalis pedis pulses are 3+ on the right side and 3+ on the left side.      Heart sounds: Normal heart sounds. No murmur heard.     No friction rub. No gallop.   Pulmonary:       Effort: Pulmonary effort is normal. No respiratory distress.      Breath sounds: Normal breath sounds. No wheezing, rhonchi or rales.   Abdominal:      General: Abdomen is flat. Bowel sounds are normal.      Palpations: Abdomen is soft.      Tenderness: There is no abdominal tenderness. There is no right CVA tenderness, left CVA tenderness, guarding or rebound. Negative signs include Barber's sign and McBurney's sign.   Musculoskeletal:         General: No swelling or deformity.      Cervical back: Full passive range of motion without pain.      Right lower leg: No edema.      Left lower leg: No edema.   Lymphadenopathy:      Cervical: No cervical adenopathy.   Skin:     Capillary Refill: Capillary refill takes less than 2 seconds.      Coloration: Skin is not jaundiced.      Findings: No rash.   Neurological:      General: No focal deficit present.      Mental Status: He is alert and oriented to person, place, and time. Mental status is at baseline.      Gait: Gait is intact.   Psychiatric:         Mood and Affect: Mood normal.         Behavior: Behavior is cooperative.         Labs Reviewed   URINALYSIS WITH REFLEX MICROSCOPIC - Abnormal       Result Value    Color, Urine Yellow      Appearance, Urine Hazy (*)     Specific Gravity, Urine 1.029      pH, Urine 5.0      Protein, Urine 30 (1+) (*)     Glucose, Urine NEGATIVE      Blood, Urine NEGATIVE      Ketones, Urine 20 (1+) (*)     Bilirubin, Urine NEGATIVE      Urobilinogen, Urine 2.0 (*)     Nitrite, Urine NEGATIVE      Leukocyte Esterase, Urine NEGATIVE     CBC WITH AUTO DIFFERENTIAL - Abnormal    WBC 5.4      nRBC 0.0      RBC 4.11 (*)     Hemoglobin 14.1      Hematocrit 39.2 (*)     MCV 95      MCH 34.3 (*)     MCHC 36.0      RDW 14.2      Platelets 180      MPV 10.2      Neutrophils % 77.3      Immature Granulocytes %, Automated 0.2      Lymphocytes % 14.3      Monocytes % 6.9      Eosinophils % 0.0      Basophils % 1.3      Neutrophils Absolute 4.15       Immature Granulocytes Absolute, Automated 0.01      Lymphocytes Absolute 0.77 (*)     Monocytes Absolute 0.37      Eosinophils Absolute 0.00      Basophils Absolute 0.07     COMPREHENSIVE METABOLIC PANEL - Abnormal    Glucose 154 (*)     Sodium 135 (*)     Potassium 4.2      Chloride 100      Bicarbonate 22      Anion Gap 17      Urea Nitrogen 22      Creatinine 1.02      eGFR 83      Calcium 9.1      Albumin 4.5      Alkaline Phosphatase 46      Total Protein 7.4      AST 82 (*)     Bilirubin, Total 0.9      ALT 76 (*)    DRUG SCREEN,URINE - Abnormal    Amphetamine Screen, Urine Presumptive Negative      Barbiturate Screen, Urine Presumptive Negative      Benzodiazepines Screen, Urine Presumptive Negative      Cannabinoid Screen, Urine Presumptive Positive (*)     Cocaine Metabolite Screen, Urine Presumptive Negative      Fentanyl Screen, Urine Presumptive Negative      Opiate Screen, Urine Presumptive Negative      Oxycodone Screen, Urine Presumptive Negative      PCP Screen, Urine Presumptive Negative      Narrative:     Drug screen results are presumptive and should not be used to assess   compliance with prescribed medication. Contact the performing [facility] laboratory   to add-on definitive confirmatory testing if clinically indicated.    Toxicology screening results are reported qualitatively. The concentration must   be greater than or equal to the cutoff to be reported as positive. The concentration   at which the screening test can detect an individual drug or metabolite varies.   The absence of expected drug(s) and/or drug metabolite(s) may indicate non-compliance,   inappropriate timing of specimen collection relative to drug administration, poor drug   absorption, diluted/adulterated urine, or limitations of testing. For medical purposes   only; not valid for forensic use.    Interpretive questions should be directed to the laboratory medical directors.   SARS-COV-2 PCR, SYMPTOMATIC - Normal    Coronavirus  2019, PCR Not Detected      Narrative:     This assay has received FDA Emergency Use Authorization (EUA) and is only authorized for the duration of time that circumstances exist to justify the authorization of the emergency use of in vitro diagnostic tests for the detection of SARS-CoV-2 virus and/or diagnosis of COVID-19 infection under section 564(b)(1) of the Act, 21 U.S.C. 360bbb-3(b)(1). This assay is an in vitro diagnostic nucleic acid amplification test for the qualitative detection of SARS-CoV-2 from nasopharyngeal specimens and has been validated for use at [facility]. Negative results do not preclude COVID-19 infections and should not be used as the sole basis for diagnosis, treatment, or other management decisions.     ACUTE TOXICOLOGY PANEL, BLOOD - Normal    Acetaminophen <10.0      Salicylate  <3      Alcohol <10     MICROSCOPIC ONLY, URINE       No orders to display            ED Course & MDM   Diagnoses as of 10/22/23 0043   Depression with suicidal ideation           Medical Decision Making  EKG interpreted by Dr. Avila. Indication: medical clearance. Findings: NSR with a ventricular rate of 84, normal axis, normal intervals, and no acute ischemic or injury pattern. Impression: No acute pathology.      Patient was seen and evaluated by Dr. Arredondo. Suicide precautions were maintained throughout his ED stay. Labs were drawn with results as above. Blood counts, electrolytes, kidney function, and liver function were all unremarkable. COVID was negative. Urinalysis shows no signs of infection. Drug screens were positive for marijuana. EKG showed no acute pathology. We find no underlying medical emergency that would prevent further psychiatric evaluation and care. Therefore, the patient is medically cleared. [facility] was consulted for such. Following their evaluation, they felt that he would benefit from inpatient psychiatric care.     Diagnostic Impression:    1.  Depression with suicidal ideation           Your medication list      You have not been prescribed any medications.           Procedure  Procedures     Ki Reyes, APRN-CNP  10/22/23 0043

## 2023-10-22 SDOH — HEALTH STABILITY: MENTAL HEALTH: NEEDS EXPRESSED: DENIES

## 2023-10-22 SDOH — SOCIAL STABILITY: SOCIAL NETWORK: EMOTIONAL SUPPORT GIVEN: REASSURE

## 2023-10-22 SDOH — HEALTH STABILITY: MENTAL HEALTH

## 2023-10-22 SDOH — HEALTH STABILITY: MENTAL HEALTH: BEHAVIORS/MOOD: CALM;FLAT AFFECT

## 2024-01-13 ENCOUNTER — HOSPITAL ENCOUNTER (EMERGENCY)
Facility: HOSPITAL | Age: 64
Discharge: HOME | End: 2024-01-14
Attending: EMERGENCY MEDICINE
Payer: MEDICAID

## 2024-01-13 ENCOUNTER — APPOINTMENT (OUTPATIENT)
Dept: RADIOLOGY | Facility: HOSPITAL | Age: 64
End: 2024-01-13
Payer: MEDICAID

## 2024-01-13 VITALS
DIASTOLIC BLOOD PRESSURE: 72 MMHG | OXYGEN SATURATION: 97 % | TEMPERATURE: 97.8 F | HEIGHT: 76 IN | WEIGHT: 215 LBS | RESPIRATION RATE: 16 BRPM | HEART RATE: 76 BPM | BODY MASS INDEX: 26.18 KG/M2 | SYSTOLIC BLOOD PRESSURE: 123 MMHG

## 2024-01-13 DIAGNOSIS — F41.9 ANXIETY: Primary | ICD-10-CM

## 2024-01-13 LAB
ALBUMIN SERPL BCP-MCNC: 4 G/DL (ref 3.4–5)
ALP SERPL-CCNC: 45 U/L (ref 33–136)
ALT SERPL W P-5'-P-CCNC: 11 U/L (ref 10–52)
ANION GAP SERPL CALC-SCNC: 11 MMOL/L (ref 10–20)
AST SERPL W P-5'-P-CCNC: 20 U/L (ref 9–39)
BASOPHILS # BLD AUTO: 0.03 X10*3/UL (ref 0–0.1)
BASOPHILS NFR BLD AUTO: 0.8 %
BILIRUB SERPL-MCNC: 0.5 MG/DL (ref 0–1.2)
BNP SERPL-MCNC: 43 PG/ML (ref 0–99)
BUN SERPL-MCNC: 12 MG/DL (ref 6–23)
CALCIUM SERPL-MCNC: 8.9 MG/DL (ref 8.6–10.3)
CARDIAC TROPONIN I PNL SERPL HS: 4 NG/L (ref 0–20)
CARDIAC TROPONIN I PNL SERPL HS: 5 NG/L (ref 0–20)
CHLORIDE SERPL-SCNC: 102 MMOL/L (ref 98–107)
CO2 SERPL-SCNC: 25 MMOL/L (ref 21–32)
CREAT SERPL-MCNC: 0.71 MG/DL (ref 0.5–1.3)
EGFRCR SERPLBLD CKD-EPI 2021: >90 ML/MIN/1.73M*2
EOSINOPHIL # BLD AUTO: 0.06 X10*3/UL (ref 0–0.7)
EOSINOPHIL NFR BLD AUTO: 1.5 %
ERYTHROCYTE [DISTWIDTH] IN BLOOD BY AUTOMATED COUNT: 12.7 % (ref 11.5–14.5)
GLUCOSE SERPL-MCNC: 116 MG/DL (ref 74–99)
HCT VFR BLD AUTO: 39.8 % (ref 41–52)
HGB BLD-MCNC: 14.1 G/DL (ref 13.5–17.5)
IMM GRANULOCYTES # BLD AUTO: 0 X10*3/UL (ref 0–0.7)
IMM GRANULOCYTES NFR BLD AUTO: 0 % (ref 0–0.9)
LYMPHOCYTES # BLD AUTO: 1.28 X10*3/UL (ref 1.2–4.8)
LYMPHOCYTES NFR BLD AUTO: 33 %
MCH RBC QN AUTO: 33.1 PG (ref 26–34)
MCHC RBC AUTO-ENTMCNC: 35.4 G/DL (ref 32–36)
MCV RBC AUTO: 93 FL (ref 80–100)
MONOCYTES # BLD AUTO: 0.38 X10*3/UL (ref 0.1–1)
MONOCYTES NFR BLD AUTO: 9.8 %
NEUTROPHILS # BLD AUTO: 2.13 X10*3/UL (ref 1.2–7.7)
NEUTROPHILS NFR BLD AUTO: 54.9 %
NRBC BLD-RTO: 0 /100 WBCS (ref 0–0)
PLATELET # BLD AUTO: 164 X10*3/UL (ref 150–450)
POTASSIUM SERPL-SCNC: 4 MMOL/L (ref 3.5–5.3)
PROT SERPL-MCNC: 7.1 G/DL (ref 6.4–8.2)
RBC # BLD AUTO: 4.26 X10*6/UL (ref 4.5–5.9)
SODIUM SERPL-SCNC: 134 MMOL/L (ref 136–145)
WBC # BLD AUTO: 3.9 X10*3/UL (ref 4.4–11.3)

## 2024-01-13 PROCEDURE — 84484 ASSAY OF TROPONIN QUANT: CPT | Performed by: EMERGENCY MEDICINE

## 2024-01-13 PROCEDURE — 99284 EMERGENCY DEPT VISIT MOD MDM: CPT | Performed by: EMERGENCY MEDICINE

## 2024-01-13 PROCEDURE — 80053 COMPREHEN METABOLIC PANEL: CPT | Performed by: EMERGENCY MEDICINE

## 2024-01-13 PROCEDURE — 2500000001 HC RX 250 WO HCPCS SELF ADMINISTERED DRUGS (ALT 637 FOR MEDICARE OP): Performed by: EMERGENCY MEDICINE

## 2024-01-13 PROCEDURE — 36415 COLL VENOUS BLD VENIPUNCTURE: CPT | Performed by: EMERGENCY MEDICINE

## 2024-01-13 PROCEDURE — 83880 ASSAY OF NATRIURETIC PEPTIDE: CPT | Performed by: EMERGENCY MEDICINE

## 2024-01-13 PROCEDURE — 71045 X-RAY EXAM CHEST 1 VIEW: CPT | Performed by: STUDENT IN AN ORGANIZED HEALTH CARE EDUCATION/TRAINING PROGRAM

## 2024-01-13 PROCEDURE — 71045 X-RAY EXAM CHEST 1 VIEW: CPT

## 2024-01-13 PROCEDURE — 99283 EMERGENCY DEPT VISIT LOW MDM: CPT | Mod: 25

## 2024-01-13 PROCEDURE — 85025 COMPLETE CBC W/AUTO DIFF WBC: CPT | Performed by: EMERGENCY MEDICINE

## 2024-01-13 RX ORDER — LANOLIN ALCOHOL/MO/W.PET/CERES
100 CREAM (GRAM) TOPICAL ONCE
Status: COMPLETED | OUTPATIENT
Start: 2024-01-13 | End: 2024-01-13

## 2024-01-13 RX ORDER — FOLIC ACID 1 MG/1
1 TABLET ORAL DAILY
Status: DISCONTINUED | OUTPATIENT
Start: 2024-01-14 | End: 2024-01-13

## 2024-01-13 RX ORDER — FOLIC ACID 1 MG/1
1 TABLET ORAL ONCE
Status: COMPLETED | OUTPATIENT
Start: 2024-01-13 | End: 2024-01-13

## 2024-01-13 RX ORDER — IBUPROFEN 200 MG
1 TABLET ORAL DAILY
Status: DISCONTINUED | OUTPATIENT
Start: 2024-01-14 | End: 2024-01-13

## 2024-01-13 RX ORDER — MULTIVIT-MIN/IRON FUM/FOLIC AC 7.5 MG-4
1 TABLET ORAL ONCE
Status: COMPLETED | OUTPATIENT
Start: 2024-01-13 | End: 2024-01-13

## 2024-01-13 RX ORDER — IBUPROFEN 200 MG
1 TABLET ORAL DAILY
Status: DISCONTINUED | OUTPATIENT
Start: 2024-01-14 | End: 2024-01-14 | Stop reason: HOSPADM

## 2024-01-13 RX ORDER — ACETAMINOPHEN 325 MG/1
650 TABLET ORAL ONCE
Status: COMPLETED | OUTPATIENT
Start: 2024-01-13 | End: 2024-01-13

## 2024-01-13 RX ORDER — LANOLIN ALCOHOL/MO/W.PET/CERES
100 CREAM (GRAM) TOPICAL DAILY
Status: DISCONTINUED | OUTPATIENT
Start: 2024-01-14 | End: 2024-01-13

## 2024-01-13 RX ORDER — MULTIVIT-MIN/IRON FUM/FOLIC AC 7.5 MG-4
1 TABLET ORAL DAILY
Status: DISCONTINUED | OUTPATIENT
Start: 2024-01-14 | End: 2024-01-13

## 2024-01-13 RX ADMIN — ACETAMINOPHEN 650 MG: 325 TABLET ORAL at 23:56

## 2024-01-13 RX ADMIN — Medication 1 TABLET: at 23:56

## 2024-01-13 RX ADMIN — FOLIC ACID 1 MG: 1 TABLET ORAL at 23:56

## 2024-01-13 RX ADMIN — Medication 100 MG: at 23:56

## 2024-01-13 ASSESSMENT — PAIN DESCRIPTION - LOCATION: LOCATION: NECK

## 2024-01-13 ASSESSMENT — LIFESTYLE VARIABLES
BLOOD PRESSURE: 123/72
PAROXYSMAL SWEATS: NO SWEAT VISIBLE
PULSE: 76
REASON UNABLE TO ASSESS: YES
AGITATION: NORMAL ACTIVITY
TREMOR: NO TREMOR
TOTAL SCORE: 6
NAUSEA AND VOMITING: NO NAUSEA AND NO VOMITING
HAVE YOU EVER FELT YOU SHOULD CUT DOWN ON YOUR DRINKING: YES
HAVE PEOPLE ANNOYED YOU BY CRITICIZING YOUR DRINKING: NO
AUDITORY DISTURBANCES: NOT PRESENT
VISUAL DISTURBANCES: NOT PRESENT
ORIENTATION AND CLOUDING OF SENSORIUM: ORIENTED AND CAN DO SERIAL ADDITIONS
ANXIETY: NO ANXIETY, AT EASE
TACTILE DISTURBANCES: VERY MILD ITCHING, PINS AND NEEDLES, BURNING OR NUMBNESS
EVER HAD A DRINK FIRST THING IN THE MORNING TO STEADY YOUR NERVES TO GET RID OF A HANGOVER: NO
EVER FELT BAD OR GUILTY ABOUT YOUR DRINKING: NO
HEADACHE, FULLNESS IN HEAD: SEVERE

## 2024-01-13 ASSESSMENT — COLUMBIA-SUICIDE SEVERITY RATING SCALE - C-SSRS
6. HAVE YOU EVER DONE ANYTHING, STARTED TO DO ANYTHING, OR PREPARED TO DO ANYTHING TO END YOUR LIFE?: NO
1. IN THE PAST MONTH, HAVE YOU WISHED YOU WERE DEAD OR WISHED YOU COULD GO TO SLEEP AND NOT WAKE UP?: NO
5. HAVE YOU STARTED TO WORK OUT OR WORKED OUT THE DETAILS OF HOW TO KILL YOURSELF? DO YOU INTEND TO CARRY OUT THIS PLAN?: NO
2. HAVE YOU ACTUALLY HAD ANY THOUGHTS OF KILLING YOURSELF?: YES
4. HAVE YOU HAD THESE THOUGHTS AND HAD SOME INTENTION OF ACTING ON THEM?: NO

## 2024-01-13 ASSESSMENT — PAIN SCALES - GENERAL: PAINLEVEL_OUTOF10: 3

## 2024-01-13 ASSESSMENT — PAIN - FUNCTIONAL ASSESSMENT: PAIN_FUNCTIONAL_ASSESSMENT: 0-10

## 2024-01-13 ASSESSMENT — PAIN DESCRIPTION - PAIN TYPE: TYPE: CHRONIC PAIN

## 2024-01-13 ASSESSMENT — PAIN DESCRIPTION - DESCRIPTORS: DESCRIPTORS: ACHING

## 2024-01-14 PROBLEM — F31.30 BIPOLAR DISORDER, MOST RECENT EPISODE DEPRESSED (MULTI): Status: ACTIVE | Noted: 2024-01-14

## 2024-01-14 PROBLEM — F41.8 DEPRESSION WITH ANXIETY: Status: ACTIVE | Noted: 2023-03-07

## 2024-01-14 PROBLEM — F41.9 ANXIETY DISORDER, UNSPECIFIED: Status: ACTIVE | Noted: 2017-11-17

## 2024-01-14 PROBLEM — F10.90 ALCOHOL USE, UNSPECIFIED, UNCOMPLICATED: Status: ACTIVE | Noted: 2023-02-22

## 2024-01-14 RX ORDER — CITALOPRAM 20 MG/1
20 TABLET, FILM COATED ORAL DAILY
Qty: 30 TABLET | Refills: 0 | Status: SHIPPED | OUTPATIENT
Start: 2024-01-14 | End: 2024-06-10 | Stop reason: HOSPADM

## 2024-01-14 RX ORDER — CITALOPRAM 20 MG/1
20 TABLET, FILM COATED ORAL DAILY
COMMUNITY
End: 2024-01-14 | Stop reason: SDUPTHER

## 2024-01-14 RX ORDER — TRAZODONE HYDROCHLORIDE 100 MG/1
100 TABLET ORAL NIGHTLY
COMMUNITY
End: 2024-01-14 | Stop reason: SDUPTHER

## 2024-01-14 RX ORDER — HYDROXYZINE PAMOATE 50 MG/1
50 CAPSULE ORAL 2 TIMES DAILY PRN
Qty: 30 CAPSULE | Refills: 0 | Status: SHIPPED | OUTPATIENT
Start: 2024-01-14 | End: 2024-06-10 | Stop reason: HOSPADM

## 2024-01-14 RX ORDER — TRAZODONE HYDROCHLORIDE 100 MG/1
100 TABLET ORAL NIGHTLY
Qty: 30 TABLET | Refills: 0 | Status: SHIPPED | OUTPATIENT
Start: 2024-01-14 | End: 2024-02-13

## 2024-01-14 RX ORDER — HYDROXYZINE PAMOATE 50 MG/1
50 CAPSULE ORAL 2 TIMES DAILY PRN
COMMUNITY
End: 2024-01-14 | Stop reason: SDUPTHER

## 2024-01-14 ASSESSMENT — HEART SCORE
AGE: 45-64
RISK FACTORS: 1-2 RISK FACTORS
ECG: NORMAL
TROPONIN: LESS THAN OR EQUAL TO NORMAL LIMIT
HISTORY: SLIGHTLY SUSPICIOUS
HEART SCORE: 2

## 2024-01-14 NOTE — ED PROVIDER NOTES
HPI   Chief Complaint   Patient presents with    Shortness of Breath     SOB 2 days.  Nonproductive cough.     Alcohol Problem     States concerned he is going thru withdrawal-drinks 1 pint of whisky per day.         Patient presents emergency department with a nervous feeling and shortness of breath.  Patient states this came on acutely when he was in his hotel room.  He states he has a history of anxiety and exam attacks in the past.  He states this feels like similar but this 1 feels worse.  Denies any inciting event.  Denies any associated exertional symptoms, diaphoresis, chest pain.  Patient is also concerned for alcohol DTs.  Last alcoholic beverage was yesterday.  Patient does have a history of DT.                          [place] Coma Scale Score: 15                  Patient History   Past Medical History:   Diagnosis Date    Alcohol abuse, in remission     History of alcohol abuse     Past Surgical History:   Procedure Laterality Date    OTHER SURGICAL HISTORY  08/26/2019    Tonsillectomy with adenoidectomy     No family history on file.  Social History     Tobacco Use    Smoking status: Every Day     Packs/day: 1     Types: Cigarettes    Smokeless tobacco: Never   Substance Use Topics    Alcohol use: Yes    Drug use: Never       Physical Exam   ED Triage Vitals [01/13/24 2016]   Temp Heart Rate Resp BP   36.6 °C (97.8 °F) 68 16 134/81      SpO2 Temp Source Heart Rate Source Patient Position   95 % Temporal -- --      BP Location FiO2 (%)     -- --       Physical Exam  Vitals and nursing note reviewed.   Constitutional:       General: He is not in acute distress.     Appearance: He is well-developed.   HENT:      Head: Normocephalic and atraumatic.      Right Ear: External ear normal.      Left Ear: External ear normal.      Mouth/Throat:      Mouth: Mucous membranes are moist.      Pharynx: Oropharynx is clear.   Eyes:      Extraocular Movements: Extraocular movements intact.      Conjunctiva/sclera:  Conjunctivae normal.   Neck:      Trachea: Trachea normal.   Cardiovascular:      Rate and Rhythm: Normal rate.   Pulmonary:      Effort: Pulmonary effort is normal. No respiratory distress.      Breath sounds: Normal breath sounds.   Abdominal:      General: Bowel sounds are normal.      Palpations: Abdomen is soft.      Tenderness: There is no abdominal tenderness.   Musculoskeletal:         General: No swelling or tenderness.      Cervical back: No tenderness.   Skin:     General: Skin is warm and dry.      Capillary Refill: Capillary refill takes less than 2 seconds.   Neurological:      General: No focal deficit present.      Mental Status: He is alert and oriented to person, place, and time.   Psychiatric:         Mood and Affect: Mood normal.         Thought Content: Thought content does not include homicidal or suicidal ideation.         ED Course & [facility]   ED Course as of 01/23/24 0955   Sat Jan 13, 2024 2159 CBC and Auto Differential(!)  CBC shows leukopenia, no anemia, no thrombocytopenia. [JH]   2159 Comprehensive metabolic panel(!)  Metabolic panel shows slight hyponatremia 134, normal kidney and liver function []   2159 BNP: 43 [JH]   2159 Troponin I, High Sensitivity: 4 [JH]   2240 XR chest 1 view  Chest x-ray is read as negative [JH]   2346 Troponin I, High Sensitivity: 5 [JH]   Sun Jan 14, 2024   0043 I feel the patient's symptoms are all anxiety.  Patient has not been taking his medications because he is run out of them.  Will start Celexa, hydroxyzine, trazodone.  Will refer to outpatient follow-up.  Low risk heart score.    No indication for admission.  Discussed findings and diagnosis with the patient, follow-up and return to ED precautions given, patient voiced understanding, agrees with plan, questions answered, patient was discharged in stable condition. []      ED Course User Index  [] Stiven Paul MD         Diagnoses as of 01/23/24 0955   Anxiety       Medical Decision  Making  Patient presents with shortness of breath and nervous feeling. Available chart reviewed. On initial assessment the patient was found non-toxic, no acute distress, vitals hemodynamically stable and afebrile. Initial concern for MI, ACS, pneumonia, pneumothorax.  NicoDerm patch ordered per patient's request.  Multivitamins given to the patient for his alcohol history.        Procedure  Procedures     Stiven Paul MD  01/23/24 0944

## 2024-02-06 ENCOUNTER — APPOINTMENT (OUTPATIENT)
Dept: CARDIOLOGY | Facility: HOSPITAL | Age: 64
End: 2024-02-06
Payer: MEDICAID

## 2024-02-06 ENCOUNTER — HOSPITAL ENCOUNTER (EMERGENCY)
Facility: HOSPITAL | Age: 64
Discharge: PSYCHIATRIC OR SUBSTANCE USE FACILITY | End: 2024-02-07
Attending: STUDENT IN AN ORGANIZED HEALTH CARE EDUCATION/TRAINING PROGRAM
Payer: MEDICAID

## 2024-02-06 DIAGNOSIS — R45.89 SUICIDAL BEHAVIOR WITHOUT ATTEMPTED SELF-INJURY: Primary | ICD-10-CM

## 2024-02-06 LAB
ALBUMIN SERPL BCP-MCNC: 4.5 G/DL (ref 3.4–5)
ALP SERPL-CCNC: 59 U/L (ref 33–136)
ALT SERPL W P-5'-P-CCNC: 17 U/L (ref 10–52)
ANION GAP SERPL CALC-SCNC: 13 MMOL/L (ref 10–20)
APAP SERPL-MCNC: <10 UG/ML
AST SERPL W P-5'-P-CCNC: 22 U/L (ref 9–39)
BASOPHILS # BLD AUTO: 0.04 X10*3/UL (ref 0–0.1)
BASOPHILS NFR BLD AUTO: 0.8 %
BILIRUB SERPL-MCNC: 0.6 MG/DL (ref 0–1.2)
BUN SERPL-MCNC: 11 MG/DL (ref 6–23)
CALCIUM SERPL-MCNC: 9 MG/DL (ref 8.6–10.3)
CHLORIDE SERPL-SCNC: 100 MMOL/L (ref 98–107)
CO2 SERPL-SCNC: 26 MMOL/L (ref 21–32)
CREAT SERPL-MCNC: 0.91 MG/DL (ref 0.5–1.3)
EGFRCR SERPLBLD CKD-EPI 2021: >90 ML/MIN/1.73M*2
EOSINOPHIL # BLD AUTO: 0.13 X10*3/UL (ref 0–0.7)
EOSINOPHIL NFR BLD AUTO: 2.6 %
ERYTHROCYTE [DISTWIDTH] IN BLOOD BY AUTOMATED COUNT: 13.4 % (ref 11.5–14.5)
ETHANOL SERPL-MCNC: 164 MG/DL
ETHANOL SERPL-MCNC: 164 MG/DL
GLUCOSE SERPL-MCNC: 142 MG/DL (ref 74–99)
HCT VFR BLD AUTO: 43.3 % (ref 41–52)
HGB BLD-MCNC: 15.4 G/DL (ref 13.5–17.5)
IMM GRANULOCYTES # BLD AUTO: 0.01 X10*3/UL (ref 0–0.7)
IMM GRANULOCYTES NFR BLD AUTO: 0.2 % (ref 0–0.9)
LACTATE SERPL-SCNC: 2.4 MMOL/L (ref 0.4–2)
LIPASE SERPL-CCNC: 10 U/L (ref 9–82)
LYMPHOCYTES # BLD AUTO: 1.64 X10*3/UL (ref 1.2–4.8)
LYMPHOCYTES NFR BLD AUTO: 32.8 %
MAGNESIUM SERPL-MCNC: 1.65 MG/DL (ref 1.6–2.4)
MCH RBC QN AUTO: 33.4 PG (ref 26–34)
MCHC RBC AUTO-ENTMCNC: 35.6 G/DL (ref 32–36)
MCV RBC AUTO: 94 FL (ref 80–100)
MONOCYTES # BLD AUTO: 0.5 X10*3/UL (ref 0.1–1)
MONOCYTES NFR BLD AUTO: 10 %
NEUTROPHILS # BLD AUTO: 2.68 X10*3/UL (ref 1.2–7.7)
NEUTROPHILS NFR BLD AUTO: 53.6 %
NRBC BLD-RTO: 0 /100 WBCS (ref 0–0)
PLATELET # BLD AUTO: 169 X10*3/UL (ref 150–450)
POTASSIUM SERPL-SCNC: 3.4 MMOL/L (ref 3.5–5.3)
PROT SERPL-MCNC: 7.8 G/DL (ref 6.4–8.2)
RBC # BLD AUTO: 4.61 X10*6/UL (ref 4.5–5.9)
SALICYLATES SERPL-MCNC: <3 MG/DL
SARS-COV-2 RNA RESP QL NAA+PROBE: NOT DETECTED
SODIUM SERPL-SCNC: 136 MMOL/L (ref 136–145)
WBC # BLD AUTO: 5 X10*3/UL (ref 4.4–11.3)

## 2024-02-06 PROCEDURE — 80143 DRUG ASSAY ACETAMINOPHEN: CPT | Performed by: STUDENT IN AN ORGANIZED HEALTH CARE EDUCATION/TRAINING PROGRAM

## 2024-02-06 PROCEDURE — 36415 COLL VENOUS BLD VENIPUNCTURE: CPT | Performed by: STUDENT IN AN ORGANIZED HEALTH CARE EDUCATION/TRAINING PROGRAM

## 2024-02-06 PROCEDURE — 83690 ASSAY OF LIPASE: CPT | Performed by: STUDENT IN AN ORGANIZED HEALTH CARE EDUCATION/TRAINING PROGRAM

## 2024-02-06 PROCEDURE — 93005 ELECTROCARDIOGRAM TRACING: CPT

## 2024-02-06 PROCEDURE — 82077 ASSAY SPEC XCP UR&BREATH IA: CPT | Performed by: STUDENT IN AN ORGANIZED HEALTH CARE EDUCATION/TRAINING PROGRAM

## 2024-02-06 PROCEDURE — 83735 ASSAY OF MAGNESIUM: CPT | Performed by: STUDENT IN AN ORGANIZED HEALTH CARE EDUCATION/TRAINING PROGRAM

## 2024-02-06 PROCEDURE — 87635 SARS-COV-2 COVID-19 AMP PRB: CPT | Performed by: STUDENT IN AN ORGANIZED HEALTH CARE EDUCATION/TRAINING PROGRAM

## 2024-02-06 PROCEDURE — 99285 EMERGENCY DEPT VISIT HI MDM: CPT | Performed by: STUDENT IN AN ORGANIZED HEALTH CARE EDUCATION/TRAINING PROGRAM

## 2024-02-06 PROCEDURE — 80053 COMPREHEN METABOLIC PANEL: CPT | Performed by: STUDENT IN AN ORGANIZED HEALTH CARE EDUCATION/TRAINING PROGRAM

## 2024-02-06 PROCEDURE — 83605 ASSAY OF LACTIC ACID: CPT | Performed by: STUDENT IN AN ORGANIZED HEALTH CARE EDUCATION/TRAINING PROGRAM

## 2024-02-06 PROCEDURE — 85025 COMPLETE CBC W/AUTO DIFF WBC: CPT | Performed by: STUDENT IN AN ORGANIZED HEALTH CARE EDUCATION/TRAINING PROGRAM

## 2024-02-06 SDOH — HEALTH STABILITY: MENTAL HEALTH: BEHAVIORS/MOOD: SLEEPING

## 2024-02-06 SDOH — HEALTH STABILITY: MENTAL HEALTH: CONTENT: UNREMARKABLE

## 2024-02-06 SDOH — HEALTH STABILITY: MENTAL HEALTH

## 2024-02-06 SDOH — HEALTH STABILITY: MENTAL HEALTH: BEHAVIORS/MOOD: CALM;COOPERATIVE

## 2024-02-06 ASSESSMENT — COLUMBIA-SUICIDE SEVERITY RATING SCALE - C-SSRS
2. HAVE YOU ACTUALLY HAD ANY THOUGHTS OF KILLING YOURSELF?: NO
4. HAVE YOU HAD THESE THOUGHTS AND HAD SOME INTENTION OF ACTING ON THEM?: YES
6. HAVE YOU EVER DONE ANYTHING, STARTED TO DO ANYTHING, OR PREPARED TO DO ANYTHING TO END YOUR LIFE?: YES
1. SINCE LAST CONTACT, HAVE YOU WISHED YOU WERE DEAD OR WISHED YOU COULD GO TO SLEEP AND NOT WAKE UP?: NO
2. HAVE YOU ACTUALLY HAD ANY THOUGHTS OF KILLING YOURSELF?: YES
6. HAVE YOU EVER DONE ANYTHING, STARTED TO DO ANYTHING, OR PREPARED TO DO ANYTHING TO END YOUR LIFE?: NO
5. HAVE YOU STARTED TO WORK OUT OR WORKED OUT THE DETAILS OF HOW TO KILL YOURSELF? DO YOU INTEND TO CARRY OUT THIS PLAN?: YES
6. HAVE YOU EVER DONE ANYTHING, STARTED TO DO ANYTHING, OR PREPARED TO DO ANYTHING TO END YOUR LIFE?: YES
1. IN THE PAST MONTH, HAVE YOU WISHED YOU WERE DEAD OR WISHED YOU COULD GO TO SLEEP AND NOT WAKE UP?: YES

## 2024-02-06 ASSESSMENT — PAIN SCALES - GENERAL: PAINLEVEL_OUTOF10: 10 - WORST POSSIBLE PAIN

## 2024-02-06 ASSESSMENT — PAIN - FUNCTIONAL ASSESSMENT: PAIN_FUNCTIONAL_ASSESSMENT: 0-10

## 2024-02-06 NOTE — ED PROVIDER NOTES
HPI   Chief Complaint   Patient presents with    Suicidal     PINK slipped by [facility]       This is a 63-year-old male with past medical history of alcohol use disorder with withdrawal seizures in the past, tobacco use, anxiety, depression who states that he has attempted in the past with overdose presents emerged permit for medical clearance from [facility].  Patient states that he has been feeling more suicidal recently and that he is blind he keeps bumping into objects.  He states that yesterday his thought was to overdose on antifreeze                          [place] Coma Scale Score: 15                  Patient History   Past Medical History:   Diagnosis Date    Alcohol abuse, in remission     History of alcohol abuse     Past Surgical History:   Procedure Laterality Date    OTHER SURGICAL HISTORY  08/26/2019    Tonsillectomy with adenoidectomy     No family history on file.  Social History     Tobacco Use    Smoking status: Every Day     Packs/day: 1     Types: Cigarettes    Smokeless tobacco: Never   Substance Use Topics    Alcohol use: Yes     Alcohol/week: 6.0 standard drinks of alcohol     Types: 6 Cans of beer per week     Comment: pint of whiskey    Drug use: Yes     Types: Marijuana       Physical Exam   ED Triage Vitals [02/06/24 1652]   Temperature Heart Rate Respirations BP   36.8 °C (98.3 °F) 91 20 108/56      Pulse Ox Temp Source Heart Rate Source Patient Position   97 % Temporal Monitor --      BP Location FiO2 (%)     Left arm --       Physical Exam  Constitutional:       General: He is not in acute distress.  HENT:      Head: Normocephalic and atraumatic.      Right Ear: Tympanic membrane normal.      Left Ear: Tympanic membrane normal.      Mouth/Throat:      Mouth: Mucous membranes are moist.   Eyes:      Extraocular Movements: Extraocular movements intact.      Conjunctiva/sclera: Conjunctivae normal.      Pupils: Pupils are equal, round, and reactive to light.   Cardiovascular:      Rate and  Rhythm: Normal rate and regular rhythm.      Heart sounds: No murmur heard.  Pulmonary:      Effort: Pulmonary effort is normal. No respiratory distress.      Breath sounds: Normal breath sounds. No stridor. No wheezing or rales.   Abdominal:      General: Bowel sounds are normal. There is no distension.      Tenderness: There is no abdominal tenderness. There is no guarding or rebound.   Musculoskeletal:         General: No swelling, tenderness or deformity. Normal range of motion.   Skin:     General: Skin is warm and dry.      Coloration: Skin is not jaundiced.      Findings: No bruising or lesion.   Neurological:      General: No focal deficit present.      Mental Status: He is alert and oriented to person, place, and time. Mental status is at baseline.      Cranial Nerves: No cranial nerve deficit.      Motor: No weakness.   Psychiatric:         Mood and Affect: Mood normal.       Labs Reviewed   CBC WITH AUTO DIFFERENTIAL       Result Value    WBC 5.0      nRBC 0.0      RBC 4.61      Hemoglobin 15.4      Hematocrit 43.3      MCV 94      MCH 33.4      MCHC 35.6      RDW 13.4      Platelets 169      Neutrophils % 53.6      Immature Granulocytes %, Automated 0.2      Lymphocytes % 32.8      Monocytes % 10.0      Eosinophils % 2.6      Basophils % 0.8      Neutrophils Absolute 2.68      Immature Granulocytes Absolute, Automated 0.01      Lymphocytes Absolute 1.64      Monocytes Absolute 0.50      Eosinophils Absolute 0.13      Basophils Absolute 0.04     URINALYSIS WITH REFLEX CULTURE AND MICROSCOPIC    Narrative:     The following orders were created for panel order Urinalysis with Reflex Culture and Microscopic.  Procedure                               Abnormality         Status                     ---------                               -----------         ------                     Urinalysis with Reflex C...[[id number]]                                                 Extra Urine Gray Tube[[id number]]                                                          Please view results for these tests on the individual orders.   ACUTE TOXICOLOGY PANEL, BLOOD   MAGNESIUM   COMPREHENSIVE METABOLIC PANEL   LIPASE   LACTATE   SARS-COV-2 PCR   URINALYSIS WITH REFLEX CULTURE AND MICROSCOPIC   EXTRA URINE GRAY TUBE   ALCOHOL     No orders to display       ED Course & MDM   ED Course as of 02/07/24 0143   Tue Feb 06, 2024   1903 Alcohol(!): 164 [CF]   Wed Feb 07, 2024   0035 Sinus rhythm at a rate 89 bpm with change elevation nonischemic EKG.  There is a lot of wander on this EKG but his QTc is 480 otherwise normal intervals. [CF]      ED Course User Index  [CF] Dipika Locke MD       Medical Decision Making  This is a 63-year-old male with past most use disorder presents emergency department for medical clearance from [facility].  Patient does endorse SI to me and he did have a plan yesterday to antifreeze.  No issues or concerns.  Patient is hemodynamically stable.  He has no signs of alcohol withdrawal at this time.  Patient is EKG is as stated below.  His labs are otherwise unremarkable except for he was found to have alcohol at 162.  Pending clinical sobriety.  Urine is still pending.  I signed patient out to Dr. quezada    Drink    Procedure  Procedures     Dipika Locke MD  02/07/24 0144

## 2024-02-07 VITALS
BODY MASS INDEX: 26.18 KG/M2 | SYSTOLIC BLOOD PRESSURE: 156 MMHG | DIASTOLIC BLOOD PRESSURE: 78 MMHG | RESPIRATION RATE: 16 BRPM | TEMPERATURE: 98.1 F | HEART RATE: 87 BPM | OXYGEN SATURATION: 97 % | HEIGHT: 76 IN | WEIGHT: 215 LBS

## 2024-02-07 LAB
AMPHETAMINES UR QL SCN: ABNORMAL
APPEARANCE UR: CLEAR
BARBITURATES UR QL SCN: ABNORMAL
BENZODIAZ UR QL SCN: ABNORMAL
BILIRUB UR STRIP.AUTO-MCNC: NEGATIVE MG/DL
BZE UR QL SCN: ABNORMAL
CANNABINOIDS UR QL SCN: ABNORMAL
COLOR UR: ABNORMAL
FENTANYL+NORFENTANYL UR QL SCN: ABNORMAL
GLUCOSE UR STRIP.AUTO-MCNC: NEGATIVE MG/DL
HOLD SPECIMEN: NORMAL
HYALINE CASTS #/AREA URNS AUTO: ABNORMAL /LPF
KETONES UR STRIP.AUTO-MCNC: NEGATIVE MG/DL
LEUKOCYTE ESTERASE UR QL STRIP.AUTO: NEGATIVE
MUCOUS THREADS #/AREA URNS AUTO: ABNORMAL /LPF
NITRITE UR QL STRIP.AUTO: NEGATIVE
OPIATES UR QL SCN: ABNORMAL
OXYCODONE+OXYMORPHONE UR QL SCN: ABNORMAL
PCP UR QL SCN: ABNORMAL
PH UR STRIP.AUTO: 5.5 [PH]
PROT UR STRIP.AUTO-MCNC: ABNORMAL MG/DL
RBC # UR STRIP.AUTO: NEGATIVE /UL
RBC #/AREA URNS AUTO: ABNORMAL /HPF
SP GR UR STRIP.AUTO: >1.03
SQUAMOUS #/AREA URNS AUTO: ABNORMAL /HPF
UROBILINOGEN UR STRIP.AUTO-MCNC: ABNORMAL MG/DL
WBC #/AREA URNS AUTO: ABNORMAL /HPF

## 2024-02-07 PROCEDURE — 80307 DRUG TEST PRSMV CHEM ANLYZR: CPT | Performed by: EMERGENCY MEDICINE

## 2024-02-07 PROCEDURE — 81003 URINALYSIS AUTO W/O SCOPE: CPT | Performed by: STUDENT IN AN ORGANIZED HEALTH CARE EDUCATION/TRAINING PROGRAM

## 2024-02-07 SDOH — HEALTH STABILITY: MENTAL HEALTH: BEHAVIORS/MOOD: CALM;SLEEPING

## 2024-02-07 SDOH — HEALTH STABILITY: MENTAL HEALTH: BEHAVIORS/MOOD: SLEEPING

## 2024-02-07 SDOH — HEALTH STABILITY: MENTAL HEALTH: BEHAVIORS/MOOD: CALM;COOPERATIVE

## 2024-02-07 SDOH — HEALTH STABILITY: MENTAL HEALTH: BEHAVIORS/MOOD: COOPERATIVE;CALM

## 2024-02-07 ASSESSMENT — LIFESTYLE VARIABLES
EVER FELT BAD OR GUILTY ABOUT YOUR DRINKING: NO
HAVE YOU EVER FELT YOU SHOULD CUT DOWN ON YOUR DRINKING: NO
HAVE PEOPLE ANNOYED YOU BY CRITICIZING YOUR DRINKING: NO
EVER HAD A DRINK FIRST THING IN THE MORNING TO STEADY YOUR NERVES TO GET RID OF A HANGOVER: NO

## 2024-02-07 NOTE — SIGNIFICANT EVENT
Application for Emergency Admission      Ready for Transfer?  Is the patient medically cleared for transfer to inpatient psychiatry: Yes  Has the patient been accepted to an inpatient psychiatric hospital: Yes    Application for Emergency Admission  IN ACCORDANCE WITH [place]122[place]10 [place]  The Chief Clinical Officer of: Chloe 2/7/2024 .10:35 AM    Reason for Hospitalization  The undersigned has reason to believe that: Teddy García Is a mentally ill person subject to hospitalization by court order under division B Section 5122.01 of the Revised Code, i.e., this person:    1.Yes  Represents a substantial risk of physical harm to self as manifested by evidence of threats of, or attempts at, suicide or serious self-inflicted bodily harm    2.No Represents a substantial risk of physical harm to others as manifested by evidence of recent homicidal or other violent behavior, evidence of recent threats that place another in reasonable fear of violent behavior and serious physical harm, or other evidence of present dangerousness    3.No Represents a substantial and immediate risk of serious physical impairment or injury to self as manifested by  evidence that the person is unable to provide for and is not providing for the person's basic physical needs because of the person's mental illness and that appropriate provision for those needs cannot be made  immediately available in the community    4.Yes Would benefit from treatment in a hospital for his mental illness and is in need of such treatment as manifested by evidence of behavior that creates a grave and imminent risk to substantial rights of others or  himself.    5.Yes Would benefit from treatment as manifested by evidence of behavior that indicates all of the following:       (a) The person is unlikely to survive safely in the community without supervision, based on a clinical determination.       (b) The person has a history of lack of compliance with  treatment for mental illness and one of the following applies:      (i) At least twice within the thirty-six months prior to the filing of an affidavit seeking court-ordered treatment of the person under section 5122.111 of the Revised Code, the lack of compliance has been a significant factor in necessitating hospitalization in a hospital or receipt of services in a forensic or other mental health unit of a correctional facility, provided that the thirty-six-month period shall be extended by the length of any hospitalization or incarceration of the person that occurred within the thirty-six-month period.      (ii) Within the forty-eight months prior to the filing of an affidavit seeking court-ordered treatment of the person under section 5122.111 of the Revised Code, the lack of compliance resulted in one or more acts of serious violent behavior toward self or others or threats of, or attempts at, serious physical harm to self or others, provided that the forty-eight-month period shall be extended by the length of any hospitalization or incarceration of the person that occurred within the forty-eight-month period.      (c) The person, as a result of mental illness, is unlikely to voluntarily participate in necessary treatment.       (d) In view of the person's treatment history and current behavior, the person is in need of treatment in order to prevent a relapse or deterioration that would be likely to result in substantial risk of serious harm to the person or others.    (e) Represents a substantial risk of physical harm to self or others if allowed to remain at liberty pending examination.    Therefore, it is requested that said person be admitted to the above named facility.    STATEMENT OF BELIEF    Must be filled out by one of the following: a psychiatrist, licensed physician, licensed clinical psychologist, health or ,  or .  (Statement shall include the circumstances under  which the individual was taken into custody and the reason for the person's belief that hospitalization is necessary. The statement shall also include a reference to efforts made to secure the individual's property at his residence if he was taken into custody there. Every reasonable and appropriate effort should be made to take this person into custody in the least conspicuous manner possible.)    Patient is a serious risk to himself due to his suicidal ideation.  He poses a threat and requires inpatient psychiatric treatment for stabilization.    Soraida Eugene MD 2/7/2024     _____________________________________________________________   Place of Employment: [facility]    STATEMENT OF OBSERVATION BY PSYCHIATRIST, LICENSED PHYSICIAN, OR LICENSED CLINICAL PSYCHOLOGIST, IF APPLICABLE    Place of Observation (e.g., [facility] center, general hospital, office, emergency facility)  (If applicable, please complete)    Soraida Eugene MD 2/7/2024    _____________________________________________________________

## 2024-02-07 NOTE — PROGRESS NOTES
This patient was seen by the offgoing provider.  Please see their note for full history and physical exam.    Briefly, this is a 63 y.o. male presenting to the ED with suicidal ideation.  Patient had lab work ordered and is medically clear.  At the time of signout, patient is pending placement by [facility].    On my evaluation, patient is resting comfortably and hemodynamically stable.  Patient was able to be placed at [facility] in [place].  [place] slip was filled out as well as the EMTALA form.  Patient remained stable and was transported for further psychiatric care.    Soraida Eugene MD  Emergency Medicine

## 2024-02-10 LAB
ATRIAL RATE: 89 BPM
P AXIS: 79 DEGREES
PR INTERVAL: 169 MS
Q ONSET: 249 MS
QRS COUNT: 14 BEATS
QRS DURATION: 114 MS
QT INTERVAL: 394 MS
QTC CALCULATION(BAZETT): 480 MS
QTC FREDERICIA: 449 MS
R AXIS: 79 DEGREES
T AXIS: 57 DEGREES
T OFFSET: 446 MS
VENTRICULAR RATE: 89 BPM

## 2024-04-25 ENCOUNTER — HOSPITAL ENCOUNTER (EMERGENCY)
Facility: HOSPITAL | Age: 64
Discharge: HOME | End: 2024-04-25
Attending: EMERGENCY MEDICINE
Payer: MEDICAID

## 2024-04-25 ENCOUNTER — APPOINTMENT (OUTPATIENT)
Dept: CARDIOLOGY | Facility: HOSPITAL | Age: 64
End: 2024-04-25
Payer: MEDICAID

## 2024-04-25 VITALS
DIASTOLIC BLOOD PRESSURE: 74 MMHG | HEART RATE: 80 BPM | WEIGHT: 215 LBS | BODY MASS INDEX: 26.18 KG/M2 | TEMPERATURE: 98.2 F | HEIGHT: 76 IN | OXYGEN SATURATION: 94 % | SYSTOLIC BLOOD PRESSURE: 122 MMHG | RESPIRATION RATE: 18 BRPM

## 2024-04-25 DIAGNOSIS — R19.7 DIARRHEA, UNSPECIFIED TYPE: Primary | ICD-10-CM

## 2024-04-25 LAB
ALBUMIN SERPL BCP-MCNC: 4 G/DL (ref 3.4–5)
ALP SERPL-CCNC: 59 U/L (ref 33–136)
ALT SERPL W P-5'-P-CCNC: 14 U/L (ref 10–52)
ANION GAP SERPL CALC-SCNC: 17 MMOL/L (ref 10–20)
AST SERPL W P-5'-P-CCNC: 22 U/L (ref 9–39)
ATRIAL RATE: 104 BPM
BASOPHILS # BLD AUTO: 0.03 X10*3/UL (ref 0–0.1)
BASOPHILS NFR BLD AUTO: 0.4 %
BILIRUB SERPL-MCNC: 1 MG/DL (ref 0–1.2)
BUN SERPL-MCNC: 9 MG/DL (ref 6–23)
CALCIUM SERPL-MCNC: 8.9 MG/DL (ref 8.6–10.3)
CHLORIDE SERPL-SCNC: 95 MMOL/L (ref 98–107)
CO2 SERPL-SCNC: 24 MMOL/L (ref 21–32)
CREAT SERPL-MCNC: 0.61 MG/DL (ref 0.5–1.3)
EGFRCR SERPLBLD CKD-EPI 2021: >90 ML/MIN/1.73M*2
EOSINOPHIL # BLD AUTO: 0.03 X10*3/UL (ref 0–0.7)
EOSINOPHIL NFR BLD AUTO: 0.4 %
ERYTHROCYTE [DISTWIDTH] IN BLOOD BY AUTOMATED COUNT: 13.2 % (ref 11.5–14.5)
ETHANOL SERPL-MCNC: <10 MG/DL
GLUCOSE SERPL-MCNC: 138 MG/DL (ref 74–99)
HCT VFR BLD AUTO: 44 % (ref 41–52)
HGB BLD-MCNC: 16.1 G/DL (ref 13.5–17.5)
IMM GRANULOCYTES # BLD AUTO: 0.02 X10*3/UL (ref 0–0.7)
IMM GRANULOCYTES NFR BLD AUTO: 0.3 % (ref 0–0.9)
LACTATE SERPL-SCNC: 1.7 MMOL/L (ref 0.4–2)
LACTATE SERPL-SCNC: 2.2 MMOL/L (ref 0.4–2)
LYMPHOCYTES # BLD AUTO: 0.69 X10*3/UL (ref 1.2–4.8)
LYMPHOCYTES NFR BLD AUTO: 9.7 %
MCH RBC QN AUTO: 33.1 PG (ref 26–34)
MCHC RBC AUTO-ENTMCNC: 36.6 G/DL (ref 32–36)
MCV RBC AUTO: 90 FL (ref 80–100)
MONOCYTES # BLD AUTO: 0.6 X10*3/UL (ref 0.1–1)
MONOCYTES NFR BLD AUTO: 8.4 %
NEUTROPHILS # BLD AUTO: 5.75 X10*3/UL (ref 1.2–7.7)
NEUTROPHILS NFR BLD AUTO: 80.8 %
NRBC BLD-RTO: 0 /100 WBCS (ref 0–0)
P AXIS: 87 DEGREES
PLATELET # BLD AUTO: 149 X10*3/UL (ref 150–450)
POTASSIUM SERPL-SCNC: 3.4 MMOL/L (ref 3.5–5.3)
PR INTERVAL: 156 MS
PROT SERPL-MCNC: 7.6 G/DL (ref 6.4–8.2)
Q ONSET: 253 MS
QRS COUNT: 17 BEATS
QRS DURATION: 87 MS
QT INTERVAL: 335 MS
QTC CALCULATION(BAZETT): 439 MS
QTC FREDERICIA: 401 MS
R AXIS: 89 DEGREES
RBC # BLD AUTO: 4.87 X10*6/UL (ref 4.5–5.9)
SODIUM SERPL-SCNC: 133 MMOL/L (ref 136–145)
T AXIS: -2 DEGREES
T OFFSET: 420 MS
VENTRICULAR RATE: 103 BPM
WBC # BLD AUTO: 7.1 X10*3/UL (ref 4.4–11.3)

## 2024-04-25 PROCEDURE — 36415 COLL VENOUS BLD VENIPUNCTURE: CPT | Performed by: NURSE PRACTITIONER

## 2024-04-25 PROCEDURE — 2500000001 HC RX 250 WO HCPCS SELF ADMINISTERED DRUGS (ALT 637 FOR MEDICARE OP): Performed by: NURSE PRACTITIONER

## 2024-04-25 PROCEDURE — 93005 ELECTROCARDIOGRAM TRACING: CPT

## 2024-04-25 PROCEDURE — 85025 COMPLETE CBC W/AUTO DIFF WBC: CPT | Performed by: NURSE PRACTITIONER

## 2024-04-25 PROCEDURE — 84075 ASSAY ALKALINE PHOSPHATASE: CPT | Performed by: NURSE PRACTITIONER

## 2024-04-25 PROCEDURE — 82077 ASSAY SPEC XCP UR&BREATH IA: CPT | Performed by: NURSE PRACTITIONER

## 2024-04-25 PROCEDURE — 83605 ASSAY OF LACTIC ACID: CPT | Performed by: NURSE PRACTITIONER

## 2024-04-25 PROCEDURE — 96374 THER/PROPH/DIAG INJ IV PUSH: CPT

## 2024-04-25 PROCEDURE — 99284 EMERGENCY DEPT VISIT MOD MDM: CPT | Mod: 25

## 2024-04-25 PROCEDURE — 2500000004 HC RX 250 GENERAL PHARMACY W/ HCPCS (ALT 636 FOR OP/ED): Performed by: NURSE PRACTITIONER

## 2024-04-25 RX ORDER — LANOLIN ALCOHOL/MO/W.PET/CERES
100 CREAM (GRAM) TOPICAL ONCE
Status: COMPLETED | OUTPATIENT
Start: 2024-04-25 | End: 2024-04-25

## 2024-04-25 RX ORDER — PHENOBARBITAL SODIUM 130 MG/ML
130 INJECTION, SOLUTION INTRAMUSCULAR; INTRAVENOUS
Status: DISCONTINUED | OUTPATIENT
Start: 2024-04-25 | End: 2024-04-25 | Stop reason: HOSPADM

## 2024-04-25 RX ORDER — FOLIC ACID 1 MG/1
1 TABLET ORAL ONCE
Status: COMPLETED | OUTPATIENT
Start: 2024-04-25 | End: 2024-04-25

## 2024-04-25 RX ORDER — MULTIVIT-MIN/IRON FUM/FOLIC AC 7.5 MG-4
1 TABLET ORAL ONCE
Status: COMPLETED | OUTPATIENT
Start: 2024-04-25 | End: 2024-04-25

## 2024-04-25 RX ADMIN — THIAMINE HCL TAB 100 MG 100 MG: 100 TAB at 11:35

## 2024-04-25 RX ADMIN — SODIUM CHLORIDE 1000 ML: 9 INJECTION, SOLUTION INTRAVENOUS at 11:19

## 2024-04-25 RX ADMIN — PHENOBARBITAL SODIUM 130 MG: 130 INJECTION INTRAMUSCULAR at 11:35

## 2024-04-25 RX ADMIN — Medication 1 TABLET: at 11:35

## 2024-04-25 RX ADMIN — FOLIC ACID 1 MG: 1 TABLET ORAL at 11:35

## 2024-04-25 ASSESSMENT — LIFESTYLE VARIABLES
TREMOR: NO TREMOR
ORIENTATION AND CLOUDING OF SENSORIUM: CANNOT DO SERIAL ADDITIONS OR IS UNCERTAIN ABOUT DATE
NAUSEA AND VOMITING: NO NAUSEA AND NO VOMITING
ANXIETY: MILDLY ANXIOUS
AUDITORY DISTURBANCES: NOT PRESENT
PAROXYSMAL SWEATS: NO SWEAT VISIBLE
VISUAL DISTURBANCES: NOT PRESENT
EVER FELT BAD OR GUILTY ABOUT YOUR DRINKING: NO
EVER HAD A DRINK FIRST THING IN THE MORNING TO STEADY YOUR NERVES TO GET RID OF A HANGOVER: NO
TOTAL SCORE: 11
TOTAL SCORE: 0
AGITATION: 2
HAVE PEOPLE ANNOYED YOU BY CRITICIZING YOUR DRINKING: NO
HAVE YOU EVER FELT YOU SHOULD CUT DOWN ON YOUR DRINKING: NO
HEADACHE, FULLNESS IN HEAD: EXTREMELY SEVERE

## 2024-04-25 ASSESSMENT — COLUMBIA-SUICIDE SEVERITY RATING SCALE - C-SSRS
2. HAVE YOU ACTUALLY HAD ANY THOUGHTS OF KILLING YOURSELF?: NO
1. IN THE PAST MONTH, HAVE YOU WISHED YOU WERE DEAD OR WISHED YOU COULD GO TO SLEEP AND NOT WAKE UP?: NO
4. HAVE YOU HAD THESE THOUGHTS AND HAD SOME INTENTION OF ACTING ON THEM?: NO
5. HAVE YOU STARTED TO WORK OUT OR WORKED OUT THE DETAILS OF HOW TO KILL YOURSELF? DO YOU INTEND TO CARRY OUT THIS PLAN?: NO
6. HAVE YOU EVER DONE ANYTHING, STARTED TO DO ANYTHING, OR PREPARED TO DO ANYTHING TO END YOUR LIFE?: NO

## 2024-04-25 ASSESSMENT — PAIN - FUNCTIONAL ASSESSMENT: PAIN_FUNCTIONAL_ASSESSMENT: 0-10

## 2024-04-25 ASSESSMENT — PAIN SCALES - GENERAL: PAINLEVEL_OUTOF10: 10 - WORST POSSIBLE PAIN

## 2024-04-25 ASSESSMENT — PAIN DESCRIPTION - ORIENTATION: ORIENTATION: LOWER

## 2024-04-25 ASSESSMENT — PAIN DESCRIPTION - LOCATION: LOCATION: GENERALIZED

## 2024-04-25 ASSESSMENT — PAIN DESCRIPTION - DESCRIPTORS: DESCRIPTORS: ACHING

## 2024-04-25 ASSESSMENT — PAIN DESCRIPTION - FREQUENCY: FREQUENCY: CONSTANT/CONTINUOUS

## 2024-04-25 ASSESSMENT — PAIN DESCRIPTION - PAIN TYPE: TYPE: CHRONIC PAIN

## 2024-04-25 NOTE — ED PROVIDER NOTES
HPI   Chief Complaint   Patient presents with    Abdominal Pain    Constipation     Pt took mag citrate prior to arrival to ED       This is a 63-year-old  male, with a past medical history of anxiety, depression with suicidal ideation, tobacco and alcohol abuse, left foot osteomyelitis status post great toe amputation, that is presenting to the emergency room for evaluation of abdominal pain and constipation.  The patient states that he felt like he was constipated.  Patient took a whole bottle of magnesium citrate.  The patient called EMS for transport to the emergency room for constipation.  The patient reports that he was able to move his bowels.  The patient had a bowel movement immediately on arrival to the emergency room.  The patient was covered in stool.  The patient reported that his last drink was yesterday evening.  He reports that he is feeling nauseous and has bodyaches all over.  He denies any chest pain, shortness of breath, dizziness, palpitations, paresthesias, focal weakness.  He denies any alteration in his urine function.  He is not having any fever or cold-like symptoms.      History provided by:  EMS personnel, patient and medical records   used: No                        [place] Coma Scale Score: 15                     Patient History   Past Medical History:   Diagnosis Date    Alcohol abuse, in remission     History of alcohol abuse     Past Surgical History:   Procedure Laterality Date    OTHER SURGICAL HISTORY  08/26/2019    Tonsillectomy with adenoidectomy     No family history on file.  Social History     Tobacco Use    Smoking status: Every Day     Current packs/day: 1.00     Types: Cigarettes    Smokeless tobacco: Never   Substance Use Topics    Alcohol use: Yes     Alcohol/week: 6.0 standard drinks of alcohol     Types: 6 Cans of beer per week     Comment: pint of whiskey    Drug use: Yes     Types: Marijuana       Physical Exam   ED Triage Vitals  [04/25/24 1112]   Temperature Heart Rate Respirations BP   36.8 °C (98.2 °F) (!) 110 16 145/89      Pulse Ox Temp src Heart Rate Source Patient Position   96 % -- -- --      BP Location FiO2 (%)     -- --       Physical Exam  Constitutional:       Comments: Patient is covered in stool.   HENT:      Head: Normocephalic.      Right Ear: External ear normal.      Left Ear: External ear normal.      Nose: Nose normal.      Mouth/Throat:      Pharynx: Oropharynx is clear.   Eyes:      Conjunctiva/sclera: Conjunctivae normal.   Cardiovascular:      Rate and Rhythm: Tachycardia present.      Pulses: Normal pulses.      Heart sounds: Normal heart sounds.   Pulmonary:      Effort: Pulmonary effort is normal.      Breath sounds: Normal breath sounds.   Abdominal:      General: Bowel sounds are normal.      Palpations: Abdomen is soft.   Musculoskeletal:         General: Normal range of motion.      Cervical back: Normal range of motion.      Comments: Patient has left great toe amputation.   Skin:     General: Skin is warm.      Capillary Refill: Capillary refill takes less than 2 seconds.   Neurological:      General: No focal deficit present.      Mental Status: He is alert.   Psychiatric:         Mood and Affect: Mood normal.         ED Course & MDM   Diagnoses as of 04/25/24 1828   Diarrhea, unspecified type       Medical Decision Making  Patient was seen and evaluated with the attending physician, Dr. Whipple.  The patient is presenting to the emergency room with complaints of constipation and abdominal pain.  The patient's abdominal exam was benign.  The patient was covered in stool on arrival to the emergency room.  The patient was cleaned.  He does not have any signs of trauma.  The patient reports that he feels improved since moving his bowels.  The patient's heart rate was elevated.  A saline lock was established.  Laboratory studies were drawn with results as noted he was administered 1 L normal saline wide open for  hydration, phenobarbital 135 mg IVP, thiamine, multivitamin, and folic acid.  The patient's labs show that he had a lactic acidosis of 2.2.  His alcohol level was less than 10.  He did not have an elevated white count.  His creatinine and BUN are within normal limits.  The patient was administered an additional liter of normal saline.  His vital signs improved.  His lactic acid decreased to 1.7.  Repeat abdominal exams were benign.  At this time, the patient did not show any obvious signs of alcohol withdrawal.  He did not have abdominal pain.  The patient was able to tolerate foods and did not appear to be in any acute distress.  At this time, we felt the patient could be discharged home.  He is to increase his oral fluid intake.  The patient is to follow up with their primary care physician in the next 2-3 days.  The patient is to return to the ED worse in any way.  The patient was discharged in stable condition with computer discharge instructions given. Patient was agreeable with discharge planning.        Procedure  Procedures     BHARAT Guajardo-OANH  04/25/24 1140

## 2024-06-04 ENCOUNTER — APPOINTMENT (OUTPATIENT)
Dept: RADIOLOGY | Facility: HOSPITAL | Age: 64
End: 2024-06-04
Payer: MEDICAID

## 2024-06-04 ENCOUNTER — APPOINTMENT (OUTPATIENT)
Dept: CARDIOLOGY | Facility: HOSPITAL | Age: 64
End: 2024-06-04
Payer: MEDICAID

## 2024-06-04 ENCOUNTER — HOSPITAL ENCOUNTER (INPATIENT)
Facility: HOSPITAL | Age: 64
LOS: 5 days | Discharge: NURSING, REHAB OR LONG-TERM CARE | End: 2024-06-10
Attending: EMERGENCY MEDICINE | Admitting: INTERNAL MEDICINE
Payer: MEDICAID

## 2024-06-04 DIAGNOSIS — Z72.0 TOBACCO USE: ICD-10-CM

## 2024-06-04 DIAGNOSIS — M25.562 CHRONIC PAIN OF LEFT KNEE: ICD-10-CM

## 2024-06-04 DIAGNOSIS — F10.90 ALCOHOL USE, UNSPECIFIED, UNCOMPLICATED: ICD-10-CM

## 2024-06-04 DIAGNOSIS — I26.99 MULTIPLE PULMONARY EMBOLI (MULTI): Primary | ICD-10-CM

## 2024-06-04 DIAGNOSIS — R06.09 DYSPNEA ON EXERTION: ICD-10-CM

## 2024-06-04 DIAGNOSIS — R53.1 GENERALIZED WEAKNESS: ICD-10-CM

## 2024-06-04 DIAGNOSIS — G89.29 CHRONIC PAIN OF LEFT KNEE: ICD-10-CM

## 2024-06-04 LAB
ALBUMIN SERPL BCP-MCNC: 4 G/DL (ref 3.4–5)
ALP SERPL-CCNC: 68 U/L (ref 33–136)
ALT SERPL W P-5'-P-CCNC: 18 U/L (ref 10–52)
ANION GAP SERPL CALC-SCNC: 13 MMOL/L (ref 10–20)
AST SERPL W P-5'-P-CCNC: 45 U/L (ref 9–39)
BASOPHILS # BLD AUTO: 0.04 X10*3/UL (ref 0–0.1)
BASOPHILS NFR BLD AUTO: 0.6 %
BILIRUB SERPL-MCNC: 1.2 MG/DL (ref 0–1.2)
BUN SERPL-MCNC: 15 MG/DL (ref 6–23)
CALCIUM SERPL-MCNC: 9.3 MG/DL (ref 8.6–10.3)
CHLORIDE SERPL-SCNC: 98 MMOL/L (ref 98–107)
CK SERPL-CCNC: 1911 U/L (ref 0–325)
CO2 SERPL-SCNC: 24 MMOL/L (ref 21–32)
CREAT SERPL-MCNC: 0.72 MG/DL (ref 0.5–1.3)
EGFRCR SERPLBLD CKD-EPI 2021: >90 ML/MIN/1.73M*2
EOSINOPHIL # BLD AUTO: 0.03 X10*3/UL (ref 0–0.7)
EOSINOPHIL NFR BLD AUTO: 0.5 %
ERYTHROCYTE [DISTWIDTH] IN BLOOD BY AUTOMATED COUNT: 13.9 % (ref 11.5–14.5)
GLUCOSE SERPL-MCNC: 139 MG/DL (ref 74–99)
HCT VFR BLD AUTO: 41.8 % (ref 41–52)
HGB BLD-MCNC: 14.9 G/DL (ref 13.5–17.5)
IMM GRANULOCYTES # BLD AUTO: 0.02 X10*3/UL (ref 0–0.7)
IMM GRANULOCYTES NFR BLD AUTO: 0.3 % (ref 0–0.9)
LYMPHOCYTES # BLD AUTO: 1.42 X10*3/UL (ref 1.2–4.8)
LYMPHOCYTES NFR BLD AUTO: 22.3 %
MCH RBC QN AUTO: 31.7 PG (ref 26–34)
MCHC RBC AUTO-ENTMCNC: 35.6 G/DL (ref 32–36)
MCV RBC AUTO: 89 FL (ref 80–100)
MONOCYTES # BLD AUTO: 0.58 X10*3/UL (ref 0.1–1)
MONOCYTES NFR BLD AUTO: 9.1 %
NEUTROPHILS # BLD AUTO: 4.27 X10*3/UL (ref 1.2–7.7)
NEUTROPHILS NFR BLD AUTO: 67.2 %
NRBC BLD-RTO: 0 /100 WBCS (ref 0–0)
PLATELET # BLD AUTO: 119 X10*3/UL (ref 150–450)
POTASSIUM SERPL-SCNC: 3.3 MMOL/L (ref 3.5–5.3)
PROT SERPL-MCNC: 7.7 G/DL (ref 6.4–8.2)
RBC # BLD AUTO: 4.7 X10*6/UL (ref 4.5–5.9)
SODIUM SERPL-SCNC: 132 MMOL/L (ref 136–145)
WBC # BLD AUTO: 6.4 X10*3/UL (ref 4.4–11.3)

## 2024-06-04 PROCEDURE — 80053 COMPREHEN METABOLIC PANEL: CPT | Performed by: EMERGENCY MEDICINE

## 2024-06-04 PROCEDURE — 84484 ASSAY OF TROPONIN QUANT: CPT | Performed by: EMERGENCY MEDICINE

## 2024-06-04 PROCEDURE — 93005 ELECTROCARDIOGRAM TRACING: CPT

## 2024-06-04 PROCEDURE — 73560 X-RAY EXAM OF KNEE 1 OR 2: CPT | Mod: LT

## 2024-06-04 PROCEDURE — 73560 X-RAY EXAM OF KNEE 1 OR 2: CPT | Mod: LEFT SIDE | Performed by: STUDENT IN AN ORGANIZED HEALTH CARE EDUCATION/TRAINING PROGRAM

## 2024-06-04 PROCEDURE — 96361 HYDRATE IV INFUSION ADD-ON: CPT

## 2024-06-04 PROCEDURE — 70450 CT HEAD/BRAIN W/O DYE: CPT

## 2024-06-04 PROCEDURE — 82077 ASSAY SPEC XCP UR&BREATH IA: CPT | Performed by: INTERNAL MEDICINE

## 2024-06-04 PROCEDURE — 36415 COLL VENOUS BLD VENIPUNCTURE: CPT | Performed by: EMERGENCY MEDICINE

## 2024-06-04 PROCEDURE — 80061 LIPID PANEL: CPT | Performed by: INTERNAL MEDICINE

## 2024-06-04 PROCEDURE — 74177 CT ABD & PELVIS W/CONTRAST: CPT

## 2024-06-04 PROCEDURE — 85025 COMPLETE CBC W/AUTO DIFF WBC: CPT | Performed by: EMERGENCY MEDICINE

## 2024-06-04 PROCEDURE — 82550 ASSAY OF CK (CPK): CPT | Performed by: EMERGENCY MEDICINE

## 2024-06-04 PROCEDURE — 99285 EMERGENCY DEPT VISIT HI MDM: CPT | Mod: 25

## 2024-06-04 PROCEDURE — 2500000004 HC RX 250 GENERAL PHARMACY W/ HCPCS (ALT 636 FOR OP/ED): Performed by: EMERGENCY MEDICINE

## 2024-06-04 RX ADMIN — SODIUM CHLORIDE, POTASSIUM CHLORIDE, SODIUM LACTATE AND CALCIUM CHLORIDE 1000 ML: 600; 310; 30; 20 INJECTION, SOLUTION INTRAVENOUS at 23:53

## 2024-06-04 ASSESSMENT — COLUMBIA-SUICIDE SEVERITY RATING SCALE - C-SSRS
1. IN THE PAST MONTH, HAVE YOU WISHED YOU WERE DEAD OR WISHED YOU COULD GO TO SLEEP AND NOT WAKE UP?: NO
2. HAVE YOU ACTUALLY HAD ANY THOUGHTS OF KILLING YOURSELF?: NO
6. HAVE YOU EVER DONE ANYTHING, STARTED TO DO ANYTHING, OR PREPARED TO DO ANYTHING TO END YOUR LIFE?: NO

## 2024-06-04 ASSESSMENT — LIFESTYLE VARIABLES
EVER FELT BAD OR GUILTY ABOUT YOUR DRINKING: YES
HAVE PEOPLE ANNOYED YOU BY CRITICIZING YOUR DRINKING: YES
TOTAL SCORE: 4
HAVE YOU EVER FELT YOU SHOULD CUT DOWN ON YOUR DRINKING: YES
EVER HAD A DRINK FIRST THING IN THE MORNING TO STEADY YOUR NERVES TO GET RID OF A HANGOVER: YES

## 2024-06-04 ASSESSMENT — PAIN DESCRIPTION - ORIENTATION: ORIENTATION: LEFT

## 2024-06-04 ASSESSMENT — PAIN DESCRIPTION - PAIN TYPE: TYPE: ACUTE PAIN

## 2024-06-04 ASSESSMENT — PAIN SCALES - GENERAL: PAINLEVEL_OUTOF10: 6

## 2024-06-04 ASSESSMENT — PAIN - FUNCTIONAL ASSESSMENT: PAIN_FUNCTIONAL_ASSESSMENT: 0-10

## 2024-06-05 ENCOUNTER — APPOINTMENT (OUTPATIENT)
Dept: CARDIOLOGY | Facility: HOSPITAL | Age: 64
End: 2024-06-05
Payer: MEDICAID

## 2024-06-05 ENCOUNTER — APPOINTMENT (OUTPATIENT)
Dept: RADIOLOGY | Facility: HOSPITAL | Age: 64
End: 2024-06-05
Payer: MEDICAID

## 2024-06-05 PROBLEM — I26.99 MULTIPLE PULMONARY EMBOLI (MULTI): Status: ACTIVE | Noted: 2024-06-05

## 2024-06-05 LAB
ALBUMIN SERPL BCP-MCNC: 3.4 G/DL (ref 3.4–5)
AMPHETAMINES UR QL SCN: ABNORMAL
ANION GAP SERPL CALC-SCNC: 11 MMOL/L (ref 10–20)
AORTIC VALVE MEAN GRADIENT: 3 MMHG
AORTIC VALVE PEAK VELOCITY: 1.17 M/S
APPEARANCE UR: CLEAR
APTT PPP: 29 SECONDS (ref 27–38)
ATRIAL RATE: 80 BPM
AV PEAK GRADIENT: 5.5 MMHG
AVA (PEAK VEL): 3.18 CM2
AVA (VTI): 3.73 CM2
BACTERIA #/AREA URNS AUTO: ABNORMAL /HPF
BARBITURATES UR QL SCN: ABNORMAL
BENZODIAZ UR QL SCN: ABNORMAL
BILIRUB UR STRIP.AUTO-MCNC: NEGATIVE MG/DL
BNP SERPL-MCNC: 27 PG/ML (ref 0–99)
BUN SERPL-MCNC: 14 MG/DL (ref 6–23)
BZE UR QL SCN: ABNORMAL
CALCIUM SERPL-MCNC: 8.5 MG/DL (ref 8.6–10.3)
CANNABINOIDS UR QL SCN: ABNORMAL
CARDIAC TROPONIN I PNL SERPL HS: 15 NG/L (ref 0–20)
CHLORIDE SERPL-SCNC: 100 MMOL/L (ref 98–107)
CHOLEST SERPL-MCNC: 156 MG/DL (ref 0–199)
CHOLESTEROL/HDL RATIO: 1.9
CK SERPL-CCNC: 1247 U/L (ref 0–325)
CO2 SERPL-SCNC: 26 MMOL/L (ref 21–32)
COLOR UR: ABNORMAL
CREAT SERPL-MCNC: 0.62 MG/DL (ref 0.5–1.3)
EGFRCR SERPLBLD CKD-EPI 2021: >90 ML/MIN/1.73M*2
EJECTION FRACTION APICAL 4 CHAMBER: 56
ERYTHROCYTE [DISTWIDTH] IN BLOOD BY AUTOMATED COUNT: 14 % (ref 11.5–14.5)
EST. AVERAGE GLUCOSE BLD GHB EST-MCNC: 120 MG/DL
ETHANOL SERPL-MCNC: <10 MG/DL
FENTANYL+NORFENTANYL UR QL SCN: ABNORMAL
GLUCOSE SERPL-MCNC: 159 MG/DL (ref 74–99)
GLUCOSE UR STRIP.AUTO-MCNC: NORMAL MG/DL
HBA1C MFR BLD: 5.8 %
HCT VFR BLD AUTO: 38.5 % (ref 41–52)
HDLC SERPL-MCNC: 84.3 MG/DL
HGB BLD-MCNC: 13.7 G/DL (ref 13.5–17.5)
HOLD SPECIMEN: NORMAL
HYALINE CASTS #/AREA URNS AUTO: ABNORMAL /LPF
INR PPP: 1.1 (ref 0.9–1.1)
KETONES UR STRIP.AUTO-MCNC: ABNORMAL MG/DL
LDLC SERPL CALC-MCNC: 58 MG/DL
LEFT ATRIUM VOLUME AREA LENGTH INDEX BSA: 13.1 ML/M2
LEFT VENTRICLE INTERNAL DIMENSION DIASTOLE: 4.7 CM (ref 3.5–6)
LEFT VENTRICULAR OUTFLOW TRACT DIAMETER: 2.5 CM
LEUKOCYTE ESTERASE UR QL STRIP.AUTO: NEGATIVE
LV EJECTION FRACTION BIPLANE: 62 %
MAGNESIUM SERPL-MCNC: 1.47 MG/DL (ref 1.6–2.4)
MCH RBC QN AUTO: 32.1 PG (ref 26–34)
MCHC RBC AUTO-ENTMCNC: 35.6 G/DL (ref 32–36)
MCV RBC AUTO: 90 FL (ref 80–100)
METHADONE UR QL SCN: ABNORMAL
MITRAL VALVE E/A RATIO: 0.76
MITRAL VALVE E/E' RATIO: 6.56
NITRITE UR QL STRIP.AUTO: NEGATIVE
NON HDL CHOLESTEROL: 72 MG/DL (ref 0–149)
NRBC BLD-RTO: 0 /100 WBCS (ref 0–0)
OPIATES UR QL SCN: ABNORMAL
OXYCODONE+OXYMORPHONE UR QL SCN: ABNORMAL
P AXIS: 102 DEGREES
PCP UR QL SCN: ABNORMAL
PH UR STRIP.AUTO: 6 [PH]
PHOSPHATE SERPL-MCNC: 2.8 MG/DL (ref 2.5–4.9)
PLATELET # BLD AUTO: 109 X10*3/UL (ref 150–450)
POTASSIUM SERPL-SCNC: 2.9 MMOL/L (ref 3.5–5.3)
PR INTERVAL: 67 MS
PROT UR STRIP.AUTO-MCNC: ABNORMAL MG/DL
PROTHROMBIN TIME: 12.8 SECONDS (ref 9.8–12.8)
Q ONSET: 253 MS
QRS COUNT: 13 BEATS
QRS DURATION: 105 MS
QT INTERVAL: 519 MS
QTC CALCULATION(BAZETT): 610 MS
QTC FREDERICIA: 578 MS
R AXIS: 79 DEGREES
RBC # BLD AUTO: 4.27 X10*6/UL (ref 4.5–5.9)
RBC # UR STRIP.AUTO: NEGATIVE /UL
RBC #/AREA URNS AUTO: ABNORMAL /HPF
RIGHT VENTRICLE FREE WALL PEAK S': 15.3 CM/S
RIGHT VENTRICLE PEAK SYSTOLIC PRESSURE: 16.8 MMHG
SODIUM SERPL-SCNC: 134 MMOL/L (ref 136–145)
SP GR UR STRIP.AUTO: 1.04
T AXIS: 52 DEGREES
T OFFSET: 513 MS
TRICUSPID ANNULAR PLANE SYSTOLIC EXCURSION: 2.6 CM
TRIGL SERPL-MCNC: 67 MG/DL (ref 0–149)
TSH SERPL-ACNC: 1.51 MIU/L (ref 0.44–3.98)
UFH PPP CHRO-ACNC: 0.6 IU/ML
UFH PPP CHRO-ACNC: 0.9 IU/ML
UROBILINOGEN UR STRIP.AUTO-MCNC: NORMAL MG/DL
VENTRICULAR RATE: 83 BPM
VLDL: 13 MG/DL (ref 0–40)
WBC # BLD AUTO: 6 X10*3/UL (ref 4.4–11.3)
WBC #/AREA URNS AUTO: ABNORMAL /HPF

## 2024-06-05 PROCEDURE — 93306 TTE W/DOPPLER COMPLETE: CPT

## 2024-06-05 PROCEDURE — 93970 EXTREMITY STUDY: CPT

## 2024-06-05 PROCEDURE — 97530 THERAPEUTIC ACTIVITIES: CPT | Mod: GO

## 2024-06-05 PROCEDURE — 81001 URINALYSIS AUTO W/SCOPE: CPT | Performed by: EMERGENCY MEDICINE

## 2024-06-05 PROCEDURE — 99291 CRITICAL CARE FIRST HOUR: CPT | Performed by: INTERNAL MEDICINE

## 2024-06-05 PROCEDURE — 2500000001 HC RX 250 WO HCPCS SELF ADMINISTERED DRUGS (ALT 637 FOR MEDICARE OP): Performed by: INTERNAL MEDICINE

## 2024-06-05 PROCEDURE — 2500000002 HC RX 250 W HCPCS SELF ADMINISTERED DRUGS (ALT 637 FOR MEDICARE OP, ALT 636 FOR OP/ED): Performed by: EMERGENCY MEDICINE

## 2024-06-05 PROCEDURE — 96368 THER/DIAG CONCURRENT INF: CPT

## 2024-06-05 PROCEDURE — S4991 NICOTINE PATCH NONLEGEND: HCPCS | Performed by: EMERGENCY MEDICINE

## 2024-06-05 PROCEDURE — 2500000004 HC RX 250 GENERAL PHARMACY W/ HCPCS (ALT 636 FOR OP/ED): Performed by: EMERGENCY MEDICINE

## 2024-06-05 PROCEDURE — 96376 TX/PRO/DX INJ SAME DRUG ADON: CPT

## 2024-06-05 PROCEDURE — 93971 EXTREMITY STUDY: CPT | Performed by: RADIOLOGY

## 2024-06-05 PROCEDURE — 2500000005 HC RX 250 GENERAL PHARMACY W/O HCPCS: Performed by: INTERNAL MEDICINE

## 2024-06-05 PROCEDURE — 83880 ASSAY OF NATRIURETIC PEPTIDE: CPT | Performed by: EMERGENCY MEDICINE

## 2024-06-05 PROCEDURE — 85520 HEPARIN ASSAY: CPT | Performed by: EMERGENCY MEDICINE

## 2024-06-05 PROCEDURE — 93306 TTE W/DOPPLER COMPLETE: CPT | Performed by: INTERNAL MEDICINE

## 2024-06-05 PROCEDURE — 99223 1ST HOSP IP/OBS HIGH 75: CPT | Performed by: INTERNAL MEDICINE

## 2024-06-05 PROCEDURE — 97166 OT EVAL MOD COMPLEX 45 MIN: CPT | Mod: GO

## 2024-06-05 PROCEDURE — 74177 CT ABD & PELVIS W/CONTRAST: CPT | Performed by: STUDENT IN AN ORGANIZED HEALTH CARE EDUCATION/TRAINING PROGRAM

## 2024-06-05 PROCEDURE — 97162 PT EVAL MOD COMPLEX 30 MIN: CPT | Mod: GP | Performed by: PHYSICAL THERAPIST

## 2024-06-05 PROCEDURE — 2500000004 HC RX 250 GENERAL PHARMACY W/ HCPCS (ALT 636 FOR OP/ED): Performed by: INTERNAL MEDICINE

## 2024-06-05 PROCEDURE — 96374 THER/PROPH/DIAG INJ IV PUSH: CPT | Mod: 59

## 2024-06-05 PROCEDURE — 85520 HEPARIN ASSAY: CPT | Performed by: INTERNAL MEDICINE

## 2024-06-05 PROCEDURE — 82550 ASSAY OF CK (CPK): CPT | Performed by: INTERNAL MEDICINE

## 2024-06-05 PROCEDURE — 36415 COLL VENOUS BLD VENIPUNCTURE: CPT | Performed by: EMERGENCY MEDICINE

## 2024-06-05 PROCEDURE — 80069 RENAL FUNCTION PANEL: CPT | Performed by: INTERNAL MEDICINE

## 2024-06-05 PROCEDURE — 2060000001 HC INTERMEDIATE ICU ROOM DAILY

## 2024-06-05 PROCEDURE — 2550000001 HC RX 255 CONTRASTS: Performed by: EMERGENCY MEDICINE

## 2024-06-05 PROCEDURE — 84443 ASSAY THYROID STIM HORMONE: CPT | Performed by: INTERNAL MEDICINE

## 2024-06-05 PROCEDURE — 80349 CANNABINOIDS NATURAL: CPT | Performed by: INTERNAL MEDICINE

## 2024-06-05 PROCEDURE — 80307 DRUG TEST PRSMV CHEM ANLYZR: CPT | Performed by: INTERNAL MEDICINE

## 2024-06-05 PROCEDURE — 36415 COLL VENOUS BLD VENIPUNCTURE: CPT | Performed by: INTERNAL MEDICINE

## 2024-06-05 PROCEDURE — 96365 THER/PROPH/DIAG IV INF INIT: CPT

## 2024-06-05 PROCEDURE — 85610 PROTHROMBIN TIME: CPT | Performed by: EMERGENCY MEDICINE

## 2024-06-05 PROCEDURE — 83036 HEMOGLOBIN GLYCOSYLATED A1C: CPT | Performed by: INTERNAL MEDICINE

## 2024-06-05 PROCEDURE — 83735 ASSAY OF MAGNESIUM: CPT | Performed by: INTERNAL MEDICINE

## 2024-06-05 PROCEDURE — 85730 THROMBOPLASTIN TIME PARTIAL: CPT | Performed by: EMERGENCY MEDICINE

## 2024-06-05 PROCEDURE — 96366 THER/PROPH/DIAG IV INF ADDON: CPT

## 2024-06-05 PROCEDURE — 70450 CT HEAD/BRAIN W/O DYE: CPT | Performed by: STUDENT IN AN ORGANIZED HEALTH CARE EDUCATION/TRAINING PROGRAM

## 2024-06-05 PROCEDURE — 71260 CT THORAX DX C+: CPT | Performed by: STUDENT IN AN ORGANIZED HEALTH CARE EDUCATION/TRAINING PROGRAM

## 2024-06-05 PROCEDURE — 85027 COMPLETE CBC AUTOMATED: CPT | Performed by: INTERNAL MEDICINE

## 2024-06-05 RX ORDER — ESCITALOPRAM OXALATE 20 MG/1
20 TABLET ORAL DAILY
COMMUNITY

## 2024-06-05 RX ORDER — ACETAMINOPHEN 325 MG/1
650 TABLET ORAL EVERY 4 HOURS PRN
Status: DISCONTINUED | OUTPATIENT
Start: 2024-06-05 | End: 2024-06-10 | Stop reason: HOSPADM

## 2024-06-05 RX ORDER — LIDOCAINE 560 MG/1
1 PATCH PERCUTANEOUS; TOPICAL; TRANSDERMAL EVERY 24 HOURS
Status: DISCONTINUED | OUTPATIENT
Start: 2024-06-05 | End: 2024-06-10 | Stop reason: HOSPADM

## 2024-06-05 RX ORDER — MAGNESIUM SULFATE HEPTAHYDRATE 40 MG/ML
2 INJECTION, SOLUTION INTRAVENOUS ONCE
Status: COMPLETED | OUTPATIENT
Start: 2024-06-05 | End: 2024-06-05

## 2024-06-05 RX ORDER — LORAZEPAM 1 MG/1
1 TABLET ORAL EVERY 2 HOUR PRN
Status: DISCONTINUED | OUTPATIENT
Start: 2024-06-05 | End: 2024-06-09

## 2024-06-05 RX ORDER — SODIUM CHLORIDE, SODIUM LACTATE, POTASSIUM CHLORIDE, CALCIUM CHLORIDE 600; 310; 30; 20 MG/100ML; MG/100ML; MG/100ML; MG/100ML
100 INJECTION, SOLUTION INTRAVENOUS CONTINUOUS
Status: DISCONTINUED | OUTPATIENT
Start: 2024-06-05 | End: 2024-06-05

## 2024-06-05 RX ORDER — IBUPROFEN 200 MG
1 TABLET ORAL EVERY 24 HOURS
Status: DISCONTINUED | OUTPATIENT
Start: 2024-07-17 | End: 2024-06-10 | Stop reason: HOSPADM

## 2024-06-05 RX ORDER — LORAZEPAM 1 MG/1
2 TABLET ORAL EVERY 2 HOUR PRN
Status: DISCONTINUED | OUTPATIENT
Start: 2024-06-05 | End: 2024-06-09

## 2024-06-05 RX ORDER — POTASSIUM CHLORIDE 14.9 MG/ML
20 INJECTION INTRAVENOUS
Status: COMPLETED | OUTPATIENT
Start: 2024-06-05 | End: 2024-06-05

## 2024-06-05 RX ORDER — THIAMINE HYDROCHLORIDE 100 MG/ML
100 INJECTION, SOLUTION INTRAMUSCULAR; INTRAVENOUS DAILY
Status: COMPLETED | OUTPATIENT
Start: 2024-06-05 | End: 2024-06-07

## 2024-06-05 RX ORDER — POTASSIUM CHLORIDE 1.5 G/1.58G
40 POWDER, FOR SOLUTION ORAL ONCE
Status: COMPLETED | OUTPATIENT
Start: 2024-06-05 | End: 2024-06-05

## 2024-06-05 RX ORDER — IBUPROFEN 200 MG
1 TABLET ORAL EVERY 24 HOURS
Status: DISCONTINUED | OUTPATIENT
Start: 2024-06-05 | End: 2024-06-10 | Stop reason: HOSPADM

## 2024-06-05 RX ORDER — NICOTINE 7MG/24HR
1 PATCH, TRANSDERMAL 24 HOURS TRANSDERMAL EVERY 24 HOURS
Status: DISCONTINUED | OUTPATIENT
Start: 2024-07-31 | End: 2024-06-10 | Stop reason: HOSPADM

## 2024-06-05 RX ORDER — OLANZAPINE 5 MG/1
5 TABLET ORAL 2 TIMES DAILY
COMMUNITY

## 2024-06-05 RX ORDER — LORAZEPAM 0.5 MG/1
0.5 TABLET ORAL EVERY 2 HOUR PRN
Status: DISCONTINUED | OUTPATIENT
Start: 2024-06-05 | End: 2024-06-09

## 2024-06-05 RX ORDER — HEPARIN SODIUM 10000 [USP'U]/100ML
0-4500 INJECTION, SOLUTION INTRAVENOUS CONTINUOUS
Status: DISCONTINUED | OUTPATIENT
Start: 2024-06-05 | End: 2024-06-05

## 2024-06-05 RX ORDER — LANOLIN ALCOHOL/MO/W.PET/CERES
100 CREAM (GRAM) TOPICAL DAILY
Status: DISCONTINUED | OUTPATIENT
Start: 2024-06-08 | End: 2024-06-10 | Stop reason: HOSPADM

## 2024-06-05 RX ORDER — ONDANSETRON HYDROCHLORIDE 2 MG/ML
4 INJECTION, SOLUTION INTRAVENOUS EVERY 6 HOURS PRN
Status: DISCONTINUED | OUTPATIENT
Start: 2024-06-05 | End: 2024-06-05

## 2024-06-05 RX ORDER — FOLIC ACID 1 MG/1
1 TABLET ORAL DAILY
Status: DISCONTINUED | OUTPATIENT
Start: 2024-06-05 | End: 2024-06-10 | Stop reason: HOSPADM

## 2024-06-05 RX ORDER — MULTIVIT-MIN/IRON FUM/FOLIC AC 7.5 MG-4
1 TABLET ORAL DAILY
Status: DISCONTINUED | OUTPATIENT
Start: 2024-06-05 | End: 2024-06-10 | Stop reason: HOSPADM

## 2024-06-05 RX ADMIN — MAGNESIUM SULFATE HEPTAHYDRATE 2 G: 40 INJECTION, SOLUTION INTRAVENOUS at 05:31

## 2024-06-05 RX ADMIN — APIXABAN 10 MG: 5 TABLET, FILM COATED ORAL at 12:04

## 2024-06-05 RX ADMIN — NICOTINE 1 PATCH: 21 PATCH, EXTENDED RELEASE TRANSDERMAL at 23:54

## 2024-06-05 RX ADMIN — ACETAMINOPHEN 650 MG: 325 TABLET ORAL at 16:58

## 2024-06-05 RX ADMIN — POTASSIUM CHLORIDE 40 MEQ: 1.5 POWDER, FOR SOLUTION ORAL at 05:31

## 2024-06-05 RX ADMIN — IOHEXOL 75 ML: 350 INJECTION, SOLUTION INTRAVENOUS at 00:02

## 2024-06-05 RX ADMIN — Medication 1 TABLET: at 12:04

## 2024-06-05 RX ADMIN — ACETAMINOPHEN 650 MG: 325 TABLET ORAL at 05:43

## 2024-06-05 RX ADMIN — APIXABAN 10 MG: 5 TABLET, FILM COATED ORAL at 20:50

## 2024-06-05 RX ADMIN — THIAMINE HYDROCHLORIDE 100 MG: 100 INJECTION, SOLUTION INTRAMUSCULAR; INTRAVENOUS at 12:03

## 2024-06-05 RX ADMIN — NICOTINE 1 PATCH: 21 PATCH, EXTENDED RELEASE TRANSDERMAL at 01:11

## 2024-06-05 RX ADMIN — POTASSIUM CHLORIDE 20 MEQ: 14.9 INJECTION, SOLUTION INTRAVENOUS at 07:25

## 2024-06-05 RX ADMIN — HUMAN ALBUMIN MICROSPHERES AND PERFLUTREN 0.5 ML: 10; .22 INJECTION, SOLUTION INTRAVENOUS at 11:14

## 2024-06-05 RX ADMIN — FOLIC ACID 1 MG: 1 TABLET ORAL at 12:05

## 2024-06-05 RX ADMIN — POTASSIUM CHLORIDE 20 MEQ: 14.9 INJECTION, SOLUTION INTRAVENOUS at 05:32

## 2024-06-05 RX ADMIN — SODIUM CHLORIDE, POTASSIUM CHLORIDE, SODIUM LACTATE AND CALCIUM CHLORIDE 100 ML/HR: 600; 310; 30; 20 INJECTION, SOLUTION INTRAVENOUS at 04:43

## 2024-06-05 RX ADMIN — LIDOCAINE 4% 1 PATCH: 40 PATCH TOPICAL at 05:32

## 2024-06-05 RX ADMIN — HEPARIN SODIUM 1800 UNITS/HR: 10000 INJECTION, SOLUTION INTRAVENOUS at 01:12

## 2024-06-05 SDOH — SOCIAL STABILITY: SOCIAL INSECURITY: ARE YOU OR HAVE YOU BEEN THREATENED OR ABUSED PHYSICALLY, EMOTIONALLY, OR SEXUALLY BY ANYONE?: NO

## 2024-06-05 SDOH — SOCIAL STABILITY: SOCIAL INSECURITY: HAS ANYONE EVER THREATENED TO HURT YOUR FAMILY OR YOUR PETS?: NO

## 2024-06-05 SDOH — SOCIAL STABILITY: SOCIAL INSECURITY: ARE THERE ANY APPARENT SIGNS OF INJURIES/BEHAVIORS THAT COULD BE RELATED TO ABUSE/NEGLECT?: NO

## 2024-06-05 SDOH — SOCIAL STABILITY: SOCIAL INSECURITY: HAVE YOU HAD ANY THOUGHTS OF HARMING ANYONE ELSE?: NO

## 2024-06-05 SDOH — SOCIAL STABILITY: SOCIAL INSECURITY: DO YOU FEEL UNSAFE GOING BACK TO THE PLACE WHERE YOU ARE LIVING?: NO

## 2024-06-05 SDOH — SOCIAL STABILITY: SOCIAL INSECURITY: ABUSE: ADULT

## 2024-06-05 SDOH — SOCIAL STABILITY: SOCIAL INSECURITY: HAVE YOU HAD THOUGHTS OF HARMING ANYONE ELSE?: NO

## 2024-06-05 SDOH — SOCIAL STABILITY: SOCIAL INSECURITY: DO YOU FEEL ANYONE HAS EXPLOITED OR TAKEN ADVANTAGE OF YOU FINANCIALLY OR OF YOUR PERSONAL PROPERTY?: NO

## 2024-06-05 SDOH — SOCIAL STABILITY: SOCIAL INSECURITY: DOES ANYONE TRY TO KEEP YOU FROM HAVING/CONTACTING OTHER FRIENDS OR DOING THINGS OUTSIDE YOUR HOME?: NO

## 2024-06-05 SDOH — SOCIAL STABILITY: SOCIAL INSECURITY: WERE YOU ABLE TO COMPLETE ALL THE BEHAVIORAL HEALTH SCREENINGS?: YES

## 2024-06-05 ASSESSMENT — LIFESTYLE VARIABLES
SKIP TO QUESTIONS 9-10: 0
HEADACHE, FULLNESS IN HEAD: NOT PRESENT
ANXIETY: NO ANXIETY, AT EASE
AGITATION: NORMAL ACTIVITY
VISUAL DISTURBANCES: NOT PRESENT
AUDITORY DISTURBANCES: NOT PRESENT
TREMOR: NO TREMOR
NAUSEA AND VOMITING: NO NAUSEA AND NO VOMITING
NAUSEA AND VOMITING: NO NAUSEA AND NO VOMITING
PAROXYSMAL SWEATS: NO SWEAT VISIBLE
AUDITORY DISTURBANCES: NOT PRESENT
NAUSEA AND VOMITING: NO NAUSEA AND NO VOMITING
AGITATION: NORMAL ACTIVITY
TOTAL SCORE: 0
VISUAL DISTURBANCES: NOT PRESENT
AGITATION: NORMAL ACTIVITY
HOW OFTEN DURING THE LAST YEAR HAVE YOU HAD A FEELING OF GUILT OR REMORSE AFTER DRINKING: DAILY OR ALMOST DAILY
TREMOR: 2
VISUAL DISTURBANCES: NOT PRESENT
HOW OFTEN DURING THE LAST YEAR HAVE YOU NEEDED AN ALCOHOLIC DRINK FIRST THING IN THE MORNING TO GET YOURSELF GOING AFTER A NIGHT OF HEAVY DRINKING: MONTHLY
VISUAL DISTURBANCES: NOT PRESENT
PAROXYSMAL SWEATS: NO SWEAT VISIBLE
ORIENTATION AND CLOUDING OF SENSORIUM: ORIENTED AND CAN DO SERIAL ADDITIONS
AUDITORY DISTURBANCES: NOT PRESENT
PAROXYSMAL SWEATS: NO SWEAT VISIBLE
PAROXYSMAL SWEATS: NO SWEAT VISIBLE
ORIENTATION AND CLOUDING OF SENSORIUM: ORIENTED AND CAN DO SERIAL ADDITIONS
AUDIT-C TOTAL SCORE: 11
HAS A RELATIVE, FRIEND, DOCTOR, OR ANOTHER HEALTH PROFESSIONAL EXPRESSED CONCERN ABOUT YOUR DRINKING OR SUGGESTED YOU CUT DOWN: YES, DURING THE LAST YEAR
HOW OFTEN DURING THE LAST YEAR HAVE YOU BEEN UNABLE TO REMEMBER WHAT HAPPENED THE NIGHT BEFORE BECAUSE YOU HAD BEEN DRINKING: WEEKLY
AUDITORY DISTURBANCES: NOT PRESENT
HOW OFTEN DURING THE LAST YEAR HAVE YOU FAILED TO DO WHAT WAS NORMALLY EXPECTED FROM YOU BECAUSE OF DRINKING: MONTHLY
HEADACHE, FULLNESS IN HEAD: NOT PRESENT
ORIENTATION AND CLOUDING OF SENSORIUM: ORIENTED AND CAN DO SERIAL ADDITIONS
TREMOR: 2
AGITATION: NORMAL ACTIVITY
HOW OFTEN DO YOU HAVE A DRINK CONTAINING ALCOHOL: 4 OR MORE TIMES A WEEK
ANXIETY: NO ANXIETY, AT EASE
HOW OFTEN DURING THE LAST YEAR HAVE YOU FOUND THAT YOU WERE NOT ABLE TO STOP DRINKING ONCE YOU HAD STARTED: WEEKLY
TOTAL SCORE: 0
HEADACHE, FULLNESS IN HEAD: NOT PRESENT
TOTAL SCORE: 3
HOW MANY STANDARD DRINKS CONTAINING ALCOHOL DO YOU HAVE ON A TYPICAL DAY: 7 TO 9
ANXIETY: NO ANXIETY, AT EASE
AUDIT TOTAL SCORE: 18
HEADACHE, FULLNESS IN HEAD: NOT PRESENT
ANXIETY: NO ANXIETY, AT EASE
ANXIETY: NO ANXIETY, AT EASE
AUDITORY DISTURBANCES: NOT PRESENT
NAUSEA AND VOMITING: NO NAUSEA AND NO VOMITING
HEADACHE, FULLNESS IN HEAD: NOT PRESENT
AUDIT-C TOTAL SCORE: 11
AUDIT TOTAL SCORE: 29
TREMOR: 3
AGITATION: NORMAL ACTIVITY
PAROXYSMAL SWEATS: NO SWEAT VISIBLE
TOTAL SCORE: 2
AUDITORY DISTURBANCES: NOT PRESENT
AGITATION: NORMAL ACTIVITY
HAVE YOU OR SOMEONE ELSE BEEN INJURED AS A RESULT OF YOUR DRINKING: NO
ANXIETY: NO ANXIETY, AT EASE
VISUAL DISTURBANCES: NOT PRESENT
ORIENTATION AND CLOUDING OF SENSORIUM: ORIENTED AND CAN DO SERIAL ADDITIONS
TOTAL SCORE: 2
ORIENTATION AND CLOUDING OF SENSORIUM: ORIENTED AND CAN DO SERIAL ADDITIONS
TREMOR: NO TREMOR
NAUSEA AND VOMITING: NO NAUSEA AND NO VOMITING
ORIENTATION AND CLOUDING OF SENSORIUM: ORIENTED AND CAN DO SERIAL ADDITIONS
HEADACHE, FULLNESS IN HEAD: NOT PRESENT
HOW OFTEN DO YOU HAVE 6 OR MORE DRINKS ON ONE OCCASION: DAILY OR ALMOST DAILY
TREMOR: NO TREMOR
VISUAL DISTURBANCES: NOT PRESENT
NAUSEA AND VOMITING: NO NAUSEA AND NO VOMITING
TOTAL SCORE: 0
PAROXYSMAL SWEATS: NO SWEAT VISIBLE

## 2024-06-05 ASSESSMENT — COGNITIVE AND FUNCTIONAL STATUS - GENERAL
CLIMB 3 TO 5 STEPS WITH RAILING: TOTAL
TOILETING: TOTAL
DAILY ACTIVITIY SCORE: 9
PATIENT BASELINE BEDBOUND: NO
WALKING IN HOSPITAL ROOM: TOTAL
EATING MEALS: A LITTLE
STANDING UP FROM CHAIR USING ARMS: A LOT
MOBILITY SCORE: 11
CLIMB 3 TO 5 STEPS WITH RAILING: TOTAL
DAILY ACTIVITIY SCORE: 8
TURNING FROM BACK TO SIDE WHILE IN FLAT BAD: A LOT
MOVING FROM LYING ON BACK TO SITTING ON SIDE OF FLAT BED WITH BEDRAILS: A LOT
DRESSING REGULAR UPPER BODY CLOTHING: TOTAL
PERSONAL GROOMING: A LOT
DRESSING REGULAR LOWER BODY CLOTHING: TOTAL
EATING MEALS: A LITTLE
WALKING IN HOSPITAL ROOM: A LOT
MOVING TO AND FROM BED TO CHAIR: A LOT
MOBILITY SCORE: 9
MOVING FROM LYING ON BACK TO SITTING ON SIDE OF FLAT BED WITH BEDRAILS: A LOT
HELP NEEDED FOR BATHING: TOTAL
PERSONAL GROOMING: TOTAL
TURNING FROM BACK TO SIDE WHILE IN FLAT BAD: A LOT
MOVING TO AND FROM BED TO CHAIR: TOTAL
DRESSING REGULAR UPPER BODY CLOTHING: TOTAL
STANDING UP FROM CHAIR USING ARMS: A LOT
HELP NEEDED FOR BATHING: TOTAL
TOILETING: TOTAL
DRESSING REGULAR LOWER BODY CLOTHING: TOTAL

## 2024-06-05 ASSESSMENT — PAIN SCALES - GENERAL
PAINLEVEL_OUTOF10: 3
PAINLEVEL_OUTOF10: 5 - MODERATE PAIN
PAINLEVEL_OUTOF10: 2
PAINLEVEL_OUTOF10: 0 - NO PAIN
PAINLEVEL_OUTOF10: 10 - WORST POSSIBLE PAIN
PAINLEVEL_OUTOF10: 0 - NO PAIN
PAINLEVEL_OUTOF10: 3
PAINLEVEL_OUTOF10: 5 - MODERATE PAIN

## 2024-06-05 ASSESSMENT — ENCOUNTER SYMPTOMS
SINUS PRESSURE: 0
JOINT SWELLING: 0
BACK PAIN: 0
CONFUSION: 0
WOUND: 0
SEIZURES: 0
VOMITING: 0
FEVER: 0
MYALGIAS: 1
WEAKNESS: 1
DECREASED CONCENTRATION: 0
CHILLS: 0
SINUS PAIN: 0
HEADACHES: 0
LIGHT-HEADEDNESS: 0
DIAPHORESIS: 0
HEMATURIA: 0
WHEEZING: 0
CONSTIPATION: 0
TROUBLE SWALLOWING: 0
SORE THROAT: 0
NECK PAIN: 0
DYSURIA: 0
SHORTNESS OF BREATH: 1
DIARRHEA: 0
FREQUENCY: 0
NUMBNESS: 0
NECK STIFFNESS: 0
APPETITE CHANGE: 0
ACTIVITY CHANGE: 1
FLANK PAIN: 0
UNEXPECTED WEIGHT CHANGE: 0
ABDOMINAL PAIN: 0
NERVOUS/ANXIOUS: 0
ARTHRALGIAS: 1
DIFFICULTY URINATING: 0
PHOTOPHOBIA: 0
ABDOMINAL DISTENTION: 0
BLOOD IN STOOL: 0
NAUSEA: 0
COUGH: 0
PALPITATIONS: 0
CHEST TIGHTNESS: 0
TREMORS: 0
FACIAL ASYMMETRY: 0
VOICE CHANGE: 0
BACK PAIN: 1
RHINORRHEA: 0
SPEECH DIFFICULTY: 0
POLYPHAGIA: 0
DIZZINESS: 0
FATIGUE: 1

## 2024-06-05 ASSESSMENT — PAIN - FUNCTIONAL ASSESSMENT
PAIN_FUNCTIONAL_ASSESSMENT: 0-10

## 2024-06-05 ASSESSMENT — PATIENT HEALTH QUESTIONNAIRE - PHQ9
1. LITTLE INTEREST OR PLEASURE IN DOING THINGS: NOT AT ALL
2. FEELING DOWN, DEPRESSED OR HOPELESS: NOT AT ALL
SUM OF ALL RESPONSES TO PHQ9 QUESTIONS 1 & 2: 0

## 2024-06-05 ASSESSMENT — ACTIVITIES OF DAILY LIVING (ADL)
ADL_ASSISTANCE: INDEPENDENT
BATHING: INDEPENDENT
BATHING_ASSISTANCE: MAXIMAL
WALKS IN HOME: INDEPENDENT
LACK_OF_TRANSPORTATION: NO
TOILETING: INDEPENDENT
DRESSING YOURSELF: INDEPENDENT
HEARING - LEFT EAR: FUNCTIONAL
FEEDING YOURSELF: INDEPENDENT
ADEQUATE_TO_COMPLETE_ADL: YES
GROOMING: INDEPENDENT
HEARING - RIGHT EAR: FUNCTIONAL
ASSISTIVE_DEVICE: WALKER
PATIENT'S MEMORY ADEQUATE TO SAFELY COMPLETE DAILY ACTIVITIES?: YES
JUDGMENT_ADEQUATE_SAFELY_COMPLETE_DAILY_ACTIVITIES: YES

## 2024-06-05 ASSESSMENT — PAIN DESCRIPTION - DESCRIPTORS
DESCRIPTORS: ACHING
DESCRIPTORS: ACHING

## 2024-06-05 NOTE — PROGRESS NOTES
"   06/05/24 5319   Discharge Planning   Living Arrangements Alone   Support Systems /   Assistance Needed ADL's, IADL's   Type of Residence Private residence   Number of Stairs to Enter Residence 0   Number of Stairs Within Residence 0   Home or Post Acute Services Post acute facilities (Rehab/SNF/etc)   Type of Post Acute Facility Services Skilled nursing   Patient expects to be discharged to: SNF if able   Does the patient need discharge transport arranged? Yes   RoundTrip coordination needed? Yes   Has discharge transport been arranged? No     Discharge planning assessment completed with patient. His [place]  Clarissa Collado ([phone], [email or web address]) was also present in the room at the time of the assessment. Patient lives alone in a house that was arranged for him by Clarissa. He's been there for about four months, per Clarissa. Per Clarissa, patient has a history of chronic homelessness. Patient admits to drinking beer and liquor daily, is unable to quantify how much just states \"enough to get a buzz\". Patient typically ambulates with a rollator at home. He does admit to falling. Patient does not have a PCP. He states he has no family or anyone that assists him with medical decisions. Patient did agree to SNF if recommended. He states he would be agreeable to substance abuse rehab as well. Clarissa added that patient has been to detox several times in the past. PT/OT evals are pending. TCC following.   "

## 2024-06-05 NOTE — CONSULTS
Consult Note-PERT    Patient Name: Teddy García  Patient : 1960  Room/Bed: -A  Referring Provider: Dr. Wang     Date: 24  Time: 930    Subjective:  Teddy García is a 63 year old male that presents to the ER by EMS for weakness and fall at home. He said that he was diaphoretic and had chest pain at the time. He attempted to crawl back tot he couch. He denies any recent long distance travel or surgeries He endorses that he was hospitalized in the last month with constipation. He says his left toe was removed due to infection. Denies history of DM.     Last evening, he had an episode where he had tachypnea and low oxygen saturations. He was given supplemental oxygen with improvement.      He denies any history of blood clots in the past or family history of clots. He endorses difficultly walking and pain in the left leg. He has weakness on the left leg and cannot bear weight. According to the patient this has been going on for a few months. Denies any recent injury to the left leg or surgeries. He currently denies shortness of breath/chest pain. He is lying flat in the bed during my exam in no apparent distress.     Past Medical History:  He has a past medical history of Alcohol abuse, in remission.    Past Surgical History:  He has a past surgical history that includes Other surgical history (2019).      Social History:  He reports that he has been smoking cigarettes. He has never used smokeless tobacco. He reports current alcohol use of about 6.0 standard drinks of alcohol per week. He reports current drug use. Drug: Marijuana.    Family History:  No family history on file.     Allergies:  Hydralazine    Outpatient Medications:  Current Outpatient Medications   Medication Instructions    citalopram (CELEXA) 20 mg, oral, Daily    escitalopram (LEXAPRO) 20 mg, oral, Daily    hydrOXYzine pamoate (VISTARIL) 50 mg, oral, 2 times daily PRN    OLANZapine (ZYPREXA) 5 mg, oral, 2 times daily     traZODone (DESYREL) 100 mg, oral, Nightly       Past Medical History:   Diagnosis Date    Alcohol abuse, in remission     History of alcohol abuse        Past Surgical History:   Procedure Laterality Date    OTHER SURGICAL HISTORY  08/26/2019    Tonsillectomy with adenoidectomy        No family history on file.     Social History     Socioeconomic History    Marital status: Single     Spouse name: Not on file    Number of children: Not on file    Years of education: Not on file    Highest education level: Not on file   Occupational History    Not on file   Tobacco Use    Smoking status: Every Day     Current packs/day: 1.00     Types: Cigarettes    Smokeless tobacco: Never   Substance and Sexual Activity    Alcohol use: Yes     Alcohol/week: 6.0 standard drinks of alcohol     Types: 6 Cans of beer per week     Comment: pint of whiskey    Drug use: Yes     Types: Marijuana    Sexual activity: Not on file   Other Topics Concern    Not on file   Social History Narrative    Not on file     Social Determinants of Health     Financial Resource Strain: Not on file   Food Insecurity: Not on file   Transportation Needs: Not on file   Physical Activity: Not on file   Stress: Not on file   Social Connections: Not on file   Intimate Partner Violence: Not on file   Housing Stability: Not on file       Allergies   Allergen Reactions    Hydralazine Hives       Current Outpatient Medications   Medication Instructions    citalopram (CELEXA) 20 mg, oral, Daily    escitalopram (LEXAPRO) 20 mg, oral, Daily    hydrOXYzine pamoate (VISTARIL) 50 mg, oral, 2 times daily PRN    OLANZapine (ZYPREXA) 5 mg, oral, 2 times daily    traZODone (DESYREL) 100 mg, oral, Nightly          Last Recorded Vitals:  Vitals:    06/05/24 0530 06/05/24 0700 06/05/24 0802 06/05/24 0830   BP: 144/82 129/77 126/80 118/76   Patient Position:       Pulse: 86 76 70 74   Resp: (!) 30 (!) 27 (!) 25 (!) 24   Temp:       SpO2: (!) 91% (!) 93% 96% 97%   Weight:        Height:           Vitals:  Vitals:    06/05/24 0530 06/05/24 0700 06/05/24 0802 06/05/24 0830   BP: 144/82 129/77 126/80 118/76   Patient Position:       Pulse: 86 76 70 74   Resp: (!) 30 (!) 27 (!) 25 (!) 24   Temp:       SpO2: (!) 91% (!) 93% 96% 97%   Weight:       Height:           Physical Exam:   General: Alert and Oriented, No distress, cooperative, appears stated age  Head: Normocephalic without obvious abnormality, atraumatic  Eyes: Conjunctiva/corneas clear, EOM's grossly intact  Nose: No drainage or sinus tenderness  Throat: Mucus membranes moist.  No ulcerations noted  Neck: Supple, trachea midline, No thyroid enlargement/tenderness/nodules; No carotid bruit or JVD  Lungs: Clear to auscultation bilaterally, no wheezes, rhonci, or rales. respirations unlabored  Chest Wall: No tenderness or deformity  Heart: Regular rhythm, normal S1/S2, no murmur  Abdomen: Soft, non-tender, Non-distended, bowel sounds active, no masses  Extremities: No significant pitting edema, no cyanosis, bilateral pedal pulses 2+, well approximated left toe-no oozing or exudate  Skin: Skin color, texture, turgor normal.  No rashes or lesions noted  Neurologic: responses to questions is slower than expected but answers appropriately, Strength equal bilaterally upper extremities, left leg he is able to lift off bed but not all the way, unable to hold leg up with light resistance applied. Right lower leg normal strength. Sensation grossly intact.      Intake/Output:   I/O last 2 completed shifts:  In: 1000 (10.3 mL/kg) [IV Piggyback:1000]  Out: - (0 mL/kg)   Weight: 97.5 kg       Last Labs:  CBC -  Lab Results   Component Value Date    WBC 6.0 06/05/2024    HGB 13.7 06/05/2024    HCT 38.5 (L) 06/05/2024    MCV 90 06/05/2024     (L) 06/05/2024       CMP -  Lab Results   Component Value Date    CALCIUM 8.5 (L) 06/05/2024    PHOS 2.8 06/05/2024    PROT 7.7 06/04/2024    ALBUMIN 3.4 06/05/2024    AST 45 (H) 06/04/2024    ALT 18  06/04/2024    ALKPHOS 68 06/04/2024    BILITOT 1.2 06/04/2024       LIPID PANEL -   Lab Results   Component Value Date    CHOL 156 06/04/2024    HDL 84.3 06/04/2024    CHHDL 1.9 06/04/2024    VLDL 13 06/04/2024    TRIG 67 06/04/2024    NHDL 72 06/04/2024       RENAL FUNCTION PANEL -   Lab Results   Component Value Date    K 2.9 (LL) 06/05/2024    PHOS 2.8 06/05/2024       Lab Results   Component Value Date    BNP 27 06/05/2024    HGBA1C 5.8 (H) 06/05/2024       Recent Labs: (past 2 days)  Recent Results (from the past 48 hour(s))   CBC and Auto Differential    Collection Time: 06/04/24 10:24 PM   Result Value Ref Range    WBC 6.4 4.4 - 11.3 x10*3/uL    nRBC 0.0 0.0 - 0.0 /100 WBCs    RBC 4.70 4.50 - 5.90 x10*6/uL    Hemoglobin 14.9 13.5 - 17.5 g/dL    Hematocrit 41.8 41.0 - 52.0 %    MCV 89 80 - 100 fL    MCH 31.7 26.0 - 34.0 pg    MCHC 35.6 32.0 - 36.0 g/dL    RDW 13.9 11.5 - 14.5 %    Platelets 119 (L) 150 - 450 x10*3/uL    Neutrophils % 67.2 40.0 - 80.0 %    Immature Granulocytes %, Automated 0.3 0.0 - 0.9 %    Lymphocytes % 22.3 13.0 - 44.0 %    Monocytes % 9.1 2.0 - 10.0 %    Eosinophils % 0.5 0.0 - 6.0 %    Basophils % 0.6 0.0 - 2.0 %    Neutrophils Absolute 4.27 1.20 - 7.70 x10*3/uL    Immature Granulocytes Absolute, Automated 0.02 0.00 - 0.70 x10*3/uL    Lymphocytes Absolute 1.42 1.20 - 4.80 x10*3/uL    Monocytes Absolute 0.58 0.10 - 1.00 x10*3/uL    Eosinophils Absolute 0.03 0.00 - 0.70 x10*3/uL    Basophils Absolute 0.04 0.00 - 0.10 x10*3/uL   Comprehensive metabolic panel    Collection Time: 06/04/24 10:24 PM   Result Value Ref Range    Glucose 139 (H) 74 - 99 mg/dL    Sodium 132 (L) 136 - 145 mmol/L    Potassium 3.3 (L) 3.5 - 5.3 mmol/L    Chloride 98 98 - 107 mmol/L    Bicarbonate 24 21 - 32 mmol/L    Anion Gap 13 10 - 20 mmol/L    Urea Nitrogen 15 6 - 23 mg/dL    Creatinine 0.72 0.50 - 1.30 mg/dL    eGFR >90 >60 mL/min/1.73m*2    Calcium 9.3 8.6 - 10.3 mg/dL    Albumin 4.0 3.4 - 5.0 g/dL    Alkaline  Phosphatase 68 33 - 136 U/L    Total Protein 7.7 6.4 - 8.2 g/dL    AST 45 (H) 9 - 39 U/L    Bilirubin, Total 1.2 0.0 - 1.2 mg/dL    ALT 18 10 - 52 U/L   Creatine Kinase    Collection Time: 06/04/24 10:24 PM   Result Value Ref Range    Creatine Kinase 1,911 (H) 0 - 325 U/L   Troponin I, High Sensitivity    Collection Time: 06/04/24 10:24 PM   Result Value Ref Range    Troponin I, High Sensitivity 15 0 - 20 ng/L   Alcohol    Collection Time: 06/04/24 10:24 PM   Result Value Ref Range    Alcohol <10 <=10 mg/dL   Lipid Panel    Collection Time: 06/04/24 10:24 PM   Result Value Ref Range    Cholesterol 156 0 - 199 mg/dL    HDL-Cholesterol 84.3 mg/dL    Cholesterol/HDL Ratio 1.9     LDL Calculated 58 <=99 mg/dL    VLDL 13 0 - 40 mg/dL    Triglycerides 67 0 - 149 mg/dL    Non HDL Cholesterol 72 0 - 149 mg/dL   ECG 12 lead    Collection Time: 06/04/24 10:35 PM   Result Value Ref Range    Ventricular Rate 83 BPM    Atrial Rate 80 BPM    OH Interval 67 ms    QRS Duration 105 ms    QT Interval 519 ms    QTC Calculation(Bazett) 610 ms    P Axis 102 degrees    R Axis 79 degrees    T Axis 52 degrees    QRS Count 13 beats    Q Onset 253 ms    T Offset 513 ms    QTC Fredericia 578 ms   B-Type Natriuretic Peptide    Collection Time: 06/05/24 12:49 AM   Result Value Ref Range    BNP 27 0 - 99 pg/mL   aPTT - baseline    Collection Time: 06/05/24 12:49 AM   Result Value Ref Range    aPTT 29 27 - 38 seconds   Protime-INR    Collection Time: 06/05/24 12:49 AM   Result Value Ref Range    Protime 12.8 9.8 - 12.8 seconds    INR 1.1 0.9 - 1.1   CBC    Collection Time: 06/05/24  4:27 AM   Result Value Ref Range    WBC 6.0 4.4 - 11.3 x10*3/uL    nRBC 0.0 0.0 - 0.0 /100 WBCs    RBC 4.27 (L) 4.50 - 5.90 x10*6/uL    Hemoglobin 13.7 13.5 - 17.5 g/dL    Hematocrit 38.5 (L) 41.0 - 52.0 %    MCV 90 80 - 100 fL    MCH 32.1 26.0 - 34.0 pg    MCHC 35.6 32.0 - 36.0 g/dL    RDW 14.0 11.5 - 14.5 %    Platelets 109 (L) 150 - 450 x10*3/uL   Renal Function  Panel    Collection Time: 06/05/24  4:27 AM   Result Value Ref Range    Glucose 159 (H) 74 - 99 mg/dL    Sodium 134 (L) 136 - 145 mmol/L    Potassium 2.9 (LL) 3.5 - 5.3 mmol/L    Chloride 100 98 - 107 mmol/L    Bicarbonate 26 21 - 32 mmol/L    Anion Gap 11 10 - 20 mmol/L    Urea Nitrogen 14 6 - 23 mg/dL    Creatinine 0.62 0.50 - 1.30 mg/dL    eGFR >90 >60 mL/min/1.73m*2    Calcium 8.5 (L) 8.6 - 10.3 mg/dL    Phosphorus 2.8 2.5 - 4.9 mg/dL    Albumin 3.4 3.4 - 5.0 g/dL   Magnesium    Collection Time: 06/05/24  4:27 AM   Result Value Ref Range    Magnesium 1.47 (L) 1.60 - 2.40 mg/dL   TSH with reflex to Free T4 if abnormal    Collection Time: 06/05/24  4:27 AM   Result Value Ref Range    Thyroid Stimulating Hormone 1.51 0.44 - 3.98 mIU/L   Creatine Kinase    Collection Time: 06/05/24  4:27 AM   Result Value Ref Range    Creatine Kinase 1,247 (H) 0 - 325 U/L   Hemoglobin A1C    Collection Time: 06/05/24  4:27 AM   Result Value Ref Range    Hemoglobin A1C 5.8 (H) see below %    Estimated Average Glucose 120 Not Established mg/dL   Heparin Assay, UFH    Collection Time: 06/05/24  4:27 AM   Result Value Ref Range    Heparin Unfractionated 0.9 See Comment Below for Therapeutic Ranges IU/mL   Urinalysis with Reflex Culture and Microscopic    Collection Time: 06/05/24  4:28 AM   Result Value Ref Range    Color, Urine Light-Orange (N) Light-Yellow, Yellow, Dark-Yellow    Appearance, Urine Clear Clear    Specific Gravity, Urine 1.038 (N) 1.005 - 1.035    pH, Urine 6.0 5.0, 5.5, 6.0, 6.5, 7.0, 7.5, 8.0    Protein, Urine 10 (TRACE) NEGATIVE, 10 (TRACE), 20 (TRACE) mg/dL    Glucose, Urine Normal Normal mg/dL    Blood, Urine NEGATIVE NEGATIVE    Ketones, Urine TRACE (A) NEGATIVE mg/dL    Bilirubin, Urine NEGATIVE NEGATIVE    Urobilinogen, Urine Normal Normal mg/dL    Nitrite, Urine NEGATIVE NEGATIVE    Leukocyte Esterase, Urine NEGATIVE NEGATIVE   Drug Screen, Urine With Reflex to Confirmation    Collection Time: 06/05/24  4:28 AM    Result Value Ref Range    Amphetamine Screen, Urine Presumptive Negative Presumptive Negative    Barbiturate Screen, Urine Presumptive Negative Presumptive Negative    Benzodiazepines Screen, Urine Presumptive Negative Presumptive Negative    Cannabinoid Screen, Urine Presumptive Positive (A) Presumptive Negative    Cocaine Metabolite Screen, Urine Presumptive Negative Presumptive Negative    Fentanyl Screen, Urine Presumptive Negative Presumptive Negative    Opiate Screen, Urine Presumptive Negative Presumptive Negative    Oxycodone Screen, Urine Presumptive Negative Presumptive Negative    PCP Screen, Urine Presumptive Negative Presumptive Negative    Methadone Screen, Urine Presumptive Negative Presumptive Negative   Urinalysis Microscopic    Collection Time: 06/05/24  4:28 AM   Result Value Ref Range    WBC, Urine 1-5 1-5, NONE /HPF    RBC, Urine NONE NONE, 1-2, 3-5 /HPF    Bacteria, Urine 1+ (A) NONE SEEN /HPF    Hyaline Casts, Urine 2+ (A) NONE /LPF   Heparin Assay, UFH    Collection Time: 06/05/24  8:45 AM   Result Value Ref Range    Heparin Unfractionated 0.6 See Comment Below for Therapeutic Ranges IU/mL       ECG  Encounter Date: 06/04/24   ECG 12 lead   Result Value    Ventricular Rate 83    Atrial Rate 80    VT Interval 67    QRS Duration 105    QT Interval 519    QTC Calculation(Bazett) 610    P Axis 102    R Axis 79    T Axis 52    QRS Count 13    Q Onset 253    T Offset 513    QTC Fredericia 578    Narrative    Sinus rhythm  Atrial premature complex  Short VT interval  Consider right atrial enlargement  Inferior infarct, old  Prolonged QT interval       Echocardiogram  No results found for this or any previous visit from the past 1095 days.    ECG 12 lead    Result Date: 6/5/2024  Sinus rhythm Atrial premature complex Short VT interval Consider right atrial enlargement Inferior infarct, old Prolonged QT interval    Lower extremity venous duplex bilateral    Result Date: 6/5/2024  Interpreted By:   Franki Cardozo, STUDY: [facility] LOWER EXTREMITY VENOUS DUPLEX BILATERAL; 6/5/2024 7:34 am   INDICATION: PE, DVT?   COMPARISON: None   ACCESSION NUMBER(S): [id number]   ORDERING CLINICIAN: GELA CRAIG   TECHNIQUE: Static grayscale, color Doppler, and spectral Doppler sonographic images of the bilateral lower extremities were obtained for duplex evaluation.   FINDINGS: LEFT LOWER EXTREMITY: There is no evidence of deep venous thrombus in the visualized portions of the common femoral vein, femoral vein, or popliteal vein. Respiratory variation and augmentation to calf pressure is noted. The visualized portion of the posterior tibial and peroneal veins are unremarkable.   RIGHT LOWER EXTREMITY: Occlusive thrombus extending from the common femoral vein through the popliteal vein. Suboptimal calf vein evaluation.       1. Occlusive thrombus from the femoral vein through the popliteal vein on the right.   Signed by: Franki Cardozo 6/5/2024 7:39 AM Dictation workstation:   [id number]    CT chest abdomen pelvis w IV contrast    Result Date: 6/5/2024  Interpreted By:  Lane Ayala, STUDY: CT CHEST ABDOMEN PELVIS W IV CONTRAST;  6/5/2024 12:11 am   INDICATION: Signs/Symptoms:falls, left rib/upper abd pain.   COMPARISON: 06/27/2022   ACCESSION NUMBER(S): [id number]   ORDERING CLINICIAN: COTY OREILLY   TECHNIQUE: Contiguous axial images of the chest, abdomen, and pelvis were obtained after the intravenous administration of iodinated contrast. Coronal and sagittal reformatted images were reconstructed from the axial data.   FINDINGS: CT CHEST:   MEDIASTINUM AND LYMPH NODES:  The esophageal wall appears within normal limits.  No enlarged intrathoracic or axillary lymph nodes by imaging criteria. No pneumomediastinum.   VESSELS: There is an acute saddle pulmonary embolus that extends into the bilateral lower lobe pulmonary arteries and their segmental and subsegmental branches. There is a pulmonary embolism within the right upper  lobar pulmonary artery. The main pulmonary artery is borderline dilated at 3 cm. The aorta is normal in caliber without evidence of acute traumatic injury. There is mild aortic atherosclerosis.   HEART: There is no evidence of right heart strain. The RV-LV ratio is < 1.  Moderate to severe coronary artery calcifications. No significant pericardial effusion.   LUNG, AIRWAYS, PLEURA: There is focal subpleural consolidative opacity involving the lateral segment of the left lower lobe with surrounding ground-glass density consistent with a pulmonary infarction. There is additional area of probable infarct with mild atelectasis in the lingula. The right lung is clear. No pneumothorax. There is a left upper lobe calcified granuloma.   CHEST WALL SOFT TISSUES: No discernible acute abnormality.   OSSEOUS STRUCTURES: No acute osseous abnormality. Partially visualized internal fixation hardware in the left proximal humerus. There redemonstration of chronic nondisplaced fractures of the lateral left 8th and 9th ribs.     CT ABDOMEN/PELVIS:   ABDOMINAL WALL: No significant abnormality.   LIVER: No acute parenchymal injury or suspicious lesion. Diffuse hypoattenuation of the liver consistent with steatosis.   BILE DUCTS: No significant intrahepatic or extrahepatic dilatation.   GALLBLADDER: Cholelithiasis. No discernible gallbladder wall thickening, pericholecystic fluid, or stranding.   PANCREAS: No significant abnormality.   SPLEEN: No significant abnormality.   ADRENALS: No significant abnormality.   KIDNEYS, URETERS, BLADDER:  No significant abnormality.   REPRODUCTIVE ORGANS: No significant abnormality.   VESSELS: Moderate aortic atherosclerosis without AAA.   RETROPERITONEUM/LYMPH NODES: No enlarged lymph nodes. No acute retroperitoneal abnormality.   BOWEL/MESENTERY/PERITONEUM: There is colonic diverticulosis without evidence for diverticulitis.  No inflammatory bowel wall thickening or dilatation. Normal appendix.   No  ascites, free air, or fluid collection.     MUSCULOSKELETAL: No acute osseous abnormality.       Acute saddle pulmonary embolus with large clot burden or extending into the bilateral lower lobar pulmonary arteries and their segmental and subsegmental branches, as well as into the right upper lobar pulmonary artery. No evidence of right heart strain.   Pulmonary parenchymal infarct in the left lower lobe and lingula.   No acute left rib fractures or other acute traumatic injury in the chest, abdomen, or pelvis.   Colonic diverticulosis, hepatic steatosis, cholelithiasis.   MACRO: Lane Ayala discussed the significance and urgency of this critical [facility]  COTY OREILLY on 6/5/2024 at 12:33 am.  (**-RCF-**) Findings:  See findings.   Signed by: Lane Ayala 6/5/2024 12:46 AM Dictation workstation:   [id number]    Dr. Cardozo and Dr. Alvarenga reviewed images. Case was discussed with Dr. Valera as well.     CT head wo IV contrast    Result Date: 6/5/2024  Interpreted By:  Lane Ayala, STUDY: CT HEAD WO IV CONTRAST;  6/5/2024 12:11 am   INDICATION: Signs/Symptoms:fall.   COMPARISON: 04/17/2023   ACCESSION NUMBER(S): [id number]   ORDERING CLINICIAN: COTY OREILLY   TECHNIQUE: Noncontrast axial CT images of head were obtained with coronal and sagittal reconstructed images.   FINDINGS: BRAIN PARENCHYMA: There is extensive chronic small vessel ischemic disease including chronic lacunar infarcts in the right corona radiata and basal ganglia, subinsular white matter, right thalamus, sarah, and diffusely throughout the periventricular white matter. No acute intraparenchymal hemorrhage or parenchymal evidence of acute large territory ischemic infarct. No mass-effect.   VENTRICLES and EXTRA-AXIAL SPACES:  No acute extra-axial or intraventricular hemorrhage. No effacement of cerebral sulci. Ventricles and sulci are age-concordant.   PARANASAL SINUSES/MASTOIDS:  No hemorrhage or air-fluid levels within the  visualized paranasal sinuses. The mastoids are well aerated.   CALVARIUM/ORBITS:  No skull fracture.  The orbits and globes are intact to the extent visualized.   EXTRACRANIAL SOFT TISSUES: No discernible abnormality.       1. No acute intracranial abnormality.   2. Stable, severe burden of chronic small vessel ischemic disease, primarily in the right basal ganglia.   MACRO: None.   Signed by: Lane Ayala 6/5/2024 12:30 AM Dictation workstation:   [id number]    XR knee left 1-2 views    Result Date: 6/5/2024  Interpreted By:  Lane Ayala, STUDY: XR KNEE LEFT 1-2 VIEWS; ;  6/4/2024 11:24 pm   INDICATION: Signs/Symptoms:fall.   COMPARISON: 04/17/2023   ACCESSION NUMBER(S): [id number]   ORDERING CLINICIAN: COTY OREILLY   FINDINGS: There is redemonstration of severe joint space loss in the lateral compartment and moderate degenerative change of the medial and patellofemoral compartments. There is a small joint effusion. There is no acute fracture or malalignment.       No acute osseous abnormality of the left knee.   Tricompartmental osteoarthrosis severely affecting the lateral compartment with small joint effusion.   MACRO: None.   Signed by: Lane Ayala 6/5/2024 12:27 AM Dictation workstation:   [id number]      Assessment/Plan:   Principal Problem:    Multiple pulmonary emboli (Multi)        Problem List Items Addressed This Visit       * (Principal) Multiple pulmonary emboli (Multi) - Primary    Relevant Orders    Lower extremity venous duplex bilateral (Completed)    Transthoracic Echo (TTE) Complete     Other Visit Diagnoses       Generalized weakness        Chronic pain of left knee        Dyspnea on exertion        Relevant Orders    Lower extremity venous duplex bilateral (Completed)    Transthoracic Echo (TTE) Complete        -intermediate to low risk PE for mortality based on ESC guidelines   -patient without shortness of breath, hemodynamically stable  -no intervention indicated at this  time   -echo pending   -okay to transition from heparin drip to Eliquis if okay with attending   -up and ambulating with PT   -encourage ambulation   -keep oxygen saturation greater then 92%     DVT   -right femoral to popliteal DVT occlusive   -no intervention indicated, patient is w/out swelling or pain in the right leg     Will continue to follow     PERT on call overnight Interventional Radiology [facility] [phone]  Suzy Márquez CNP  6/5/2024  11:02 AM

## 2024-06-05 NOTE — ED TRIAGE NOTES
PT HERE VIA EMS AFTER A WELL CHECK. PT C/O LEFT FLANK PAIN SINCE YESTERDAY AND LEFT KNEE PAIN. PT DENIES FALLING. PER EMS. PT IS UNKEPT. HOME IS UNKEPT. PT SENT HERE TO BE ASSESSED AND POSSIBLE PLACED IN A FACILITY. HE IS UNABLE TO CARE FOR SELF AT THIS TIME.

## 2024-06-05 NOTE — SIGNIFICANT EVENT
Notified by nursing staff about positive lower extremity venous duplex results  Was also notified by nursing staff that the patient had become increasingly tachypneic And hypoxic to the mid 80s at which time she was placed on 2 L by nasal cannula and increased to 4 L to maintain at about 88% at which time she was instructed to titrate up to maintain oxygen saturations are greater than 92%.  Lower extremity venous duplex and noted an occlusive thrombus from the femoral vein through the popliteal vein on the right.  The patient was elected to be monitored in the intensive care unit  Intensivist was consulted and signout was provided with regards to presentation and management upon arrival as well as acute events and noted.  I did consult the interventional radiologist with regards to possible thrombectomy as they are currently on-call for the PERT team.  ED nursing staff was updated of the events.,  ED physician was updated of the events.  Intensive assistant was updated of the events.  IMS physician to be notified  Patient has intact capillary refill and the RLE is warm with intact sensation. Palpable DP/PT pulses  Interventional radiology consult appreciated  Critical care consult appreciated  Continued on heparin gtt  Continue remainder management as dictated per initial H&P

## 2024-06-05 NOTE — PROGRESS NOTES
PROGRESS NOTE    Teddy García is a 63 y.o. male on day 0 of admission presenting with Multiple pulmonary emboli (Multi).    SUBJECTIVE:    History Of Present Illness:  Teddy García is a 63 y.o.  male with a past medical history significant for EtOH abuse with h/o withdrawal seizures / DTs, tobacco use disorder, history of Left Toe Ulcer/Infection in 2022 and 2023, anxiety and depression. He does have a history of ambulatory dysfunction and states he uses a rollator at baseline. He states he smokes and drinks daily but unfortunately would not characterize exactly how much. He states he fell onto his left knee a few days ago and it has been hurting. Overall he feels unwell for over a week he said maybe. He states he may have fallen a week ago but he didn't really remember.  He had also noted some pain in his left lower rib particularly when he takes a deep breath then which she thought was due to him pulling a muscle versus his recurrent falls over the past several days.  Denies initiation of any new medications.  He denied any recent sick contacts, chemical/environmental exposures, changes in dietary habits or any recent traumatic events/falls other than noted above.  He denies any fevers, chills, night sweats, vision changes, auditory changes, change in taste/smell, loss of bowel/bladder control, loss consciousness, dizziness, vertigo, syncope, seizure-like activity, chest pain, palpitations, shortness of breath, coughing, wheezing, congestion, hemoptysis, hematemesis, abdominal pain, nausea, vomiting, diarrhea, constipation, dysuria, hematuria, dyschezia, hematochezia or any lateralizing motor/sensory deficits other than noted above.     Patient stated that over the past 2 days maybe longer he really has not been able to get up out of his couch other than to really go to the bathroom and at times has had significant difficulties getting up and off of the toilet and has spent several hours laying on  his couch or sitting on the toilet.  Neighbors had called EMS for a well check and they had noted that the patient was quite unkempt as well as his house and a bit of disarray as well.  He states that he has had very poor p.o. intake over the past several days as well due to significant weakness and inability to get some food and has not had anything to eat since yesterday morning.    6/5 Spoke with the patient today and he appeared tired, but alert and oriented. He reported shortness of breath, and chest pain during inspiration, particularly on the left side of chest. He denies any palpitations, lower extremity pain or heaviness.   Overnight events: Patient desaturated to mid 80s and placed on 4 L Oxygen. Positive Lower extremity venous duplex scan.    OBJECTIVE:  Objective     Last Recorded Vitals  /73   Pulse 82   Temp 36 °C (96.8 °F)   Resp 23   Wt 97.5 kg (215 lb)   SpO2 98%     Intake/Output last 3 Shifts:    Intake/Output Summary (Last 24 hours) at 6/5/2024 1614  Last data filed at 6/5/2024 1510  Gross per 24 hour   Intake 2550 ml   Output 560 ml   Net 1990 ml       Admission Weight  Weight: 97.5 kg (215 lb) (06/04/24 2038)    Daily Weight  06/04/24 : 97.5 kg (215 lb)      Image Results  ECG 12 lead  Sinus rhythm  Atrial premature complex  Short TX interval  Consider right atrial enlargement  Inferior infarct, old  Prolonged QT interval  Lower extremity venous duplex bilateral  Narrative: Interpreted By:  Franki Cardozo,   STUDY:  [facility] LOWER EXTREMITY VENOUS DUPLEX BILATERAL; 6/5/2024 7:34 am      INDICATION:  PE, DVT?      COMPARISON:  None      ACCESSION NUMBER(S):  [id number]      ORDERING CLINICIAN:  GELA CRAIG      TECHNIQUE:  Static grayscale, color Doppler, and spectral Doppler sonographic  images of the bilateral lower extremities were obtained for duplex  evaluation.      FINDINGS:  LEFT LOWER EXTREMITY:  There is no evidence of deep venous thrombus in the visualized  portions of the  common femoral vein, femoral vein, or popliteal vein.  Respiratory variation and augmentation to calf pressure is noted. The  visualized portion of the posterior tibial and peroneal veins are  unremarkable.      RIGHT LOWER EXTREMITY:  Occlusive thrombus extending from the common femoral vein through the  popliteal vein. Suboptimal calf vein evaluation.      Impression: 1. Occlusive thrombus from the femoral vein through the popliteal  vein on the right.      Signed by: Franki Cardozo 6/5/2024 7:39 AM  Dictation workstation:   [id number]  CT chest abdomen pelvis w IV contrast  Narrative: Interpreted By:  Lane Ayala,   STUDY:  CT CHEST ABDOMEN PELVIS W IV CONTRAST;  6/5/2024 12:11 am      INDICATION:  Signs/Symptoms:falls, left rib/upper abd pain.      COMPARISON:  06/27/2022      ACCESSION NUMBER(S):  [id number]      ORDERING CLINICIAN:  COTY OREILLY      TECHNIQUE:  Contiguous axial images of the chest, abdomen, and pelvis were  obtained after the intravenous administration of iodinated contrast.  Coronal and sagittal reformatted images were reconstructed from the  axial data.      FINDINGS:  CT CHEST:      MEDIASTINUM AND LYMPH NODES:  The esophageal wall appears within  normal limits.  No enlarged intrathoracic or axillary lymph nodes by  imaging criteria. No pneumomediastinum.      VESSELS: There is an acute saddle pulmonary embolus that extends into  the bilateral lower lobe pulmonary arteries and their segmental and  subsegmental branches. There is a pulmonary embolism within the right  upper lobar pulmonary artery. The main pulmonary artery is borderline  dilated at 3 cm. The aorta is normal in caliber without evidence of  acute traumatic injury. There is mild aortic atherosclerosis.      HEART: There is no evidence of right heart strain. The RV-LV ratio is  < 1.  Moderate to severe coronary artery calcifications. No  significant pericardial effusion.      LUNG, AIRWAYS, PLEURA: There is focal subpleural  consolidative  opacity involving the lateral segment of the left lower lobe with  surrounding ground-glass density consistent with a pulmonary  infarction. There is additional area of probable infarct with mild  atelectasis in the lingula. The right lung is clear. No pneumothorax.  There is a left upper lobe calcified granuloma.      CHEST WALL SOFT TISSUES: No discernible acute abnormality.      OSSEOUS STRUCTURES: No acute osseous abnormality. Partially  visualized internal fixation hardware in the left proximal humerus.  There redemonstration of chronic nondisplaced fractures of the  lateral left 8th and 9th ribs.          CT ABDOMEN/PELVIS:      ABDOMINAL WALL: No significant abnormality.      LIVER: No acute parenchymal injury or suspicious lesion. Diffuse  hypoattenuation of the liver consistent with steatosis.      BILE DUCTS: No significant intrahepatic or extrahepatic dilatation.      GALLBLADDER: Cholelithiasis. No discernible gallbladder wall  thickening, pericholecystic fluid, or stranding.      PANCREAS: No significant abnormality.      SPLEEN: No significant abnormality.      ADRENALS: No significant abnormality.      KIDNEYS, URETERS, BLADDER:  No significant abnormality.      REPRODUCTIVE ORGANS: No significant abnormality.      VESSELS: Moderate aortic atherosclerosis without AAA.      RETROPERITONEUM/LYMPH NODES: No enlarged lymph nodes. No acute  retroperitoneal abnormality.      BOWEL/MESENTERY/PERITONEUM: There is colonic diverticulosis without  evidence for diverticulitis.  No inflammatory bowel wall thickening  or dilatation. Normal appendix.      No ascites, free air, or fluid collection.          MUSCULOSKELETAL: No acute osseous abnormality.      Impression: Acute saddle pulmonary embolus with large clot burden or extending  into the bilateral lower lobar pulmonary arteries and their segmental  and subsegmental branches, as well as into the right upper lobar  pulmonary artery. No evidence  of right heart strain.      Pulmonary parenchymal infarct in the left lower lobe and lingula.      No acute left rib fractures or other acute traumatic injury in the  chest, abdomen, or pelvis.      Colonic diverticulosis, hepatic steatosis, cholelithiasis.      MACRO:  Lane Ayala discussed the significance and urgency of this  critical finding by ERIK PABLO with  COTY OREILLY on 6/5/2024 at 12:33  am.  (**-RCF-**) Findings:  See findings.      Signed by: Lane Ayala 6/5/2024 12:46 AM  Dictation workstation:   [id number]  CT head wo IV contrast  Narrative: Interpreted By:  Lane Ayala,   STUDY:  CT HEAD WO IV CONTRAST;  6/5/2024 12:11 am      INDICATION:  Signs/Symptoms:fall.      COMPARISON:  04/17/2023      ACCESSION NUMBER(S):  [id number]      ORDERING CLINICIAN:  COTY OREILLY      TECHNIQUE:  Noncontrast axial CT images of head were obtained with coronal and  sagittal reconstructed images.      FINDINGS:  BRAIN PARENCHYMA: There is extensive chronic small vessel ischemic  disease including chronic lacunar infarcts in the right corona  radiata and basal ganglia, subinsular white matter, right thalamus,  sarah, and diffusely throughout the periventricular white matter. No  acute intraparenchymal hemorrhage or parenchymal evidence of acute  large territory ischemic infarct. No mass-effect.      VENTRICLES and EXTRA-AXIAL SPACES:  No acute extra-axial or  intraventricular hemorrhage. No effacement of cerebral sulci.  Ventricles and sulci are age-concordant.      PARANASAL SINUSES/MASTOIDS:  No hemorrhage or air-fluid levels within  the visualized paranasal sinuses. The mastoids are well aerated.      CALVARIUM/ORBITS:  No skull fracture.  The orbits and globes are  intact to the extent visualized.      EXTRACRANIAL SOFT TISSUES: No discernible abnormality.      Impression: 1. No acute intracranial abnormality.      2. Stable, severe burden of chronic small vessel ischemic disease,  primarily in the  right basal ganglia.      MACRO:  None.      Signed by: Lane Ayala 6/5/2024 12:30 AM  Dictation workstation:   [id number]  XR knee left 1-2 views  Narrative: Interpreted By:  Lane Ayala,   STUDY:  XR KNEE LEFT 1-2 VIEWS; ;  6/4/2024 11:24 pm      INDICATION:  Signs/Symptoms:fall.      COMPARISON:  04/17/2023      ACCESSION NUMBER(S):  [id number]      ORDERING CLINICIAN:  COTY OREILLY      FINDINGS:  There is redemonstration of severe joint space loss in the lateral  compartment and moderate degenerative change of the medial and  patellofemoral compartments. There is a small joint effusion. There  is no acute fracture or malalignment.      Impression: No acute osseous abnormality of the left knee.      Tricompartmental osteoarthrosis severely affecting the lateral  compartment with small joint effusion.      MACRO:  None.      Signed by: Lane Ayala 6/5/2024 12:27 AM  Dictation workstation:   [id number]      Physical Exam  HENT:      Head: Normocephalic.   Cardiovascular:      Rate and Rhythm: Normal rate and regular rhythm.      Heart sounds: Normal heart sounds.   Pulmonary:      Effort: No respiratory distress (slightly tachypneic).      Breath sounds: Normal breath sounds.   Chest:      Chest wall: Tenderness (Tenderness to palpation to the Left Side of Chest Wall) present.   Abdominal:      Palpations: Abdomen is soft.      Tenderness: There is no abdominal tenderness.   Musculoskeletal:      Right lower leg: No edema.      Left lower leg: No edema.   Skin:     Findings: No bruising, erythema or rash.   Neurological:      Mental Status: He is alert.         Relevant Results  Scheduled medications  apixaban, 10 mg, oral, BID   Followed by  [START ON 6/12/2024] apixaban, 5 mg, oral, BID  folic acid, 1 mg, oral, Daily  lidocaine, 1 patch, transdermal, q24h  multivitamin with minerals, 1 tablet, oral, Daily  nicotine, 1 patch, transdermal, q24h   Followed by  [START ON 7/17/2024] nicotine, 1  patch, transdermal, q24h   Followed by  [START ON 7/31/2024] nicotine, 1 patch, transdermal, q24h  [START ON 6/8/2024] thiamine, 100 mg, oral, Daily  thiamine, 100 mg, intravenous, Daily      Continuous medications     PRN medications  PRN medications: acetaminophen, LORazepam **OR** LORazepam **OR** LORazepam     Results for orders placed or performed during the hospital encounter of 06/04/24 (from the past 24 hour(s))   CBC and Auto Differential   Result Value Ref Range    WBC 6.4 4.4 - 11.3 x10*3/uL    nRBC 0.0 0.0 - 0.0 /100 WBCs    RBC 4.70 4.50 - 5.90 x10*6/uL    Hemoglobin 14.9 13.5 - 17.5 g/dL    Hematocrit 41.8 41.0 - 52.0 %    MCV 89 80 - 100 fL    MCH 31.7 26.0 - 34.0 pg    MCHC 35.6 32.0 - 36.0 g/dL    RDW 13.9 11.5 - 14.5 %    Platelets 119 (L) 150 - 450 x10*3/uL    Neutrophils % 67.2 40.0 - 80.0 %    Immature Granulocytes %, Automated 0.3 0.0 - 0.9 %    Lymphocytes % 22.3 13.0 - 44.0 %    Monocytes % 9.1 2.0 - 10.0 %    Eosinophils % 0.5 0.0 - 6.0 %    Basophils % 0.6 0.0 - 2.0 %    Neutrophils Absolute 4.27 1.20 - 7.70 x10*3/uL    Immature Granulocytes Absolute, Automated 0.02 0.00 - 0.70 x10*3/uL    Lymphocytes Absolute 1.42 1.20 - 4.80 x10*3/uL    Monocytes Absolute 0.58 0.10 - 1.00 x10*3/uL    Eosinophils Absolute 0.03 0.00 - 0.70 x10*3/uL    Basophils Absolute 0.04 0.00 - 0.10 x10*3/uL   Comprehensive metabolic panel   Result Value Ref Range    Glucose 139 (H) 74 - 99 mg/dL    Sodium 132 (L) 136 - 145 mmol/L    Potassium 3.3 (L) 3.5 - 5.3 mmol/L    Chloride 98 98 - 107 mmol/L    Bicarbonate 24 21 - 32 mmol/L    Anion Gap 13 10 - 20 mmol/L    Urea Nitrogen 15 6 - 23 mg/dL    Creatinine 0.72 0.50 - 1.30 mg/dL    eGFR >90 >60 mL/min/1.73m*2    Calcium 9.3 8.6 - 10.3 mg/dL    Albumin 4.0 3.4 - 5.0 g/dL    Alkaline Phosphatase 68 33 - 136 U/L    Total Protein 7.7 6.4 - 8.2 g/dL    AST 45 (H) 9 - 39 U/L    Bilirubin, Total 1.2 0.0 - 1.2 mg/dL    ALT 18 10 - 52 U/L   Creatine Kinase   Result Value Ref  Range    Creatine Kinase 1,911 (H) 0 - 325 U/L   Troponin I, High Sensitivity   Result Value Ref Range    Troponin I, High Sensitivity 15 0 - 20 ng/L   Alcohol   Result Value Ref Range    Alcohol <10 <=10 mg/dL   Lipid Panel   Result Value Ref Range    Cholesterol 156 0 - 199 mg/dL    HDL-Cholesterol 84.3 mg/dL    Cholesterol/HDL Ratio 1.9     LDL Calculated 58 <=99 mg/dL    VLDL 13 0 - 40 mg/dL    Triglycerides 67 0 - 149 mg/dL    Non HDL Cholesterol 72 0 - 149 mg/dL   ECG 12 lead   Result Value Ref Range    Ventricular Rate 83 BPM    Atrial Rate 80 BPM    NY Interval 67 ms    QRS Duration 105 ms    QT Interval 519 ms    QTC Calculation(Bazett) 610 ms    P Axis 102 degrees    R Axis 79 degrees    T Axis 52 degrees    QRS Count 13 beats    Q Onset 253 ms    T Offset 513 ms    QTC Fredericia 578 ms   B-Type Natriuretic Peptide   Result Value Ref Range    BNP 27 0 - 99 pg/mL   aPTT - baseline   Result Value Ref Range    aPTT 29 27 - 38 seconds   Protime-INR   Result Value Ref Range    Protime 12.8 9.8 - 12.8 seconds    INR 1.1 0.9 - 1.1   CBC   Result Value Ref Range    WBC 6.0 4.4 - 11.3 x10*3/uL    nRBC 0.0 0.0 - 0.0 /100 WBCs    RBC 4.27 (L) 4.50 - 5.90 x10*6/uL    Hemoglobin 13.7 13.5 - 17.5 g/dL    Hematocrit 38.5 (L) 41.0 - 52.0 %    MCV 90 80 - 100 fL    MCH 32.1 26.0 - 34.0 pg    MCHC 35.6 32.0 - 36.0 g/dL    RDW 14.0 11.5 - 14.5 %    Platelets 109 (L) 150 - 450 x10*3/uL   Renal Function Panel   Result Value Ref Range    Glucose 159 (H) 74 - 99 mg/dL    Sodium 134 (L) 136 - 145 mmol/L    Potassium 2.9 (LL) 3.5 - 5.3 mmol/L    Chloride 100 98 - 107 mmol/L    Bicarbonate 26 21 - 32 mmol/L    Anion Gap 11 10 - 20 mmol/L    Urea Nitrogen 14 6 - 23 mg/dL    Creatinine 0.62 0.50 - 1.30 mg/dL    eGFR >90 >60 mL/min/1.73m*2    Calcium 8.5 (L) 8.6 - 10.3 mg/dL    Phosphorus 2.8 2.5 - 4.9 mg/dL    Albumin 3.4 3.4 - 5.0 g/dL   Magnesium   Result Value Ref Range    Magnesium 1.47 (L) 1.60 - 2.40 mg/dL   TSH with reflex to  Free T4 if abnormal   Result Value Ref Range    Thyroid Stimulating Hormone 1.51 0.44 - 3.98 mIU/L   Creatine Kinase   Result Value Ref Range    Creatine Kinase 1,247 (H) 0 - 325 U/L   Hemoglobin A1C   Result Value Ref Range    Hemoglobin A1C 5.8 (H) see below %    Estimated Average Glucose 120 Not Established mg/dL   Heparin Assay, UFH   Result Value Ref Range    Heparin Unfractionated 0.9 See Comment Below for Therapeutic Ranges IU/mL   Urinalysis with Reflex Culture and Microscopic   Result Value Ref Range    Color, Urine Light-Orange (N) Light-Yellow, Yellow, Dark-Yellow    Appearance, Urine Clear Clear    Specific Gravity, Urine 1.038 (N) 1.005 - 1.035    pH, Urine 6.0 5.0, 5.5, 6.0, 6.5, 7.0, 7.5, 8.0    Protein, Urine 10 (TRACE) NEGATIVE, 10 (TRACE), 20 (TRACE) mg/dL    Glucose, Urine Normal Normal mg/dL    Blood, Urine NEGATIVE NEGATIVE    Ketones, Urine TRACE (A) NEGATIVE mg/dL    Bilirubin, Urine NEGATIVE NEGATIVE    Urobilinogen, Urine Normal Normal mg/dL    Nitrite, Urine NEGATIVE NEGATIVE    Leukocyte Esterase, Urine NEGATIVE NEGATIVE   Extra Urine Gray Tube   Result Value Ref Range    Extra Tube Hold for add-ons.    Drug Screen, Urine With Reflex to Confirmation   Result Value Ref Range    Amphetamine Screen, Urine Presumptive Negative Presumptive Negative    Barbiturate Screen, Urine Presumptive Negative Presumptive Negative    Benzodiazepines Screen, Urine Presumptive Negative Presumptive Negative    Cannabinoid Screen, Urine Presumptive Positive (A) Presumptive Negative    Cocaine Metabolite Screen, Urine Presumptive Negative Presumptive Negative    Fentanyl Screen, Urine Presumptive Negative Presumptive Negative    Opiate Screen, Urine Presumptive Negative Presumptive Negative    Oxycodone Screen, Urine Presumptive Negative Presumptive Negative    PCP Screen, Urine Presumptive Negative Presumptive Negative    Methadone Screen, Urine Presumptive Negative Presumptive Negative   Urinalysis Microscopic    Result Value Ref Range    WBC, Urine 1-5 1-5, NONE /HPF    RBC, Urine NONE NONE, 1-2, 3-5 /HPF    Bacteria, Urine 1+ (A) NONE SEEN /HPF    Hyaline Casts, Urine 2+ (A) NONE /LPF   Heparin Assay, UFH   Result Value Ref Range    Heparin Unfractionated 0.6 See Comment Below for Therapeutic Ranges IU/mL   Transthoracic Echo (TTE) Complete   Result Value Ref Range    BSA 2.29 m2                  ASSESSMENT AND PLAN:  Assessment/Plan   This patient currently has cardiac telemetry ordered; if you would like to modify or discontinue the telemetry order, click here to go to the orders activity to modify/discontinue the order.              Principal Problem:    Multiple pulmonary emboli (Multi)    Extensive Bilateral Saddle Pulmonary Emboli   Pulmonary Parenchymal Infarct (left lower lobe/lingula)  -CTA Chest as noted above without signs of right heart strain  -Heparin gtt   -TTE pending  -will need to transition to oral agents accordingly  -no signs of right heart strain and remains hemodynamically stable currently  -will need to discuss further with PERT team if thrombectomy is warranted give the extensive clot burden  -unclear inciting event but suspect repeat falls and limited mobility   -Lower Extremity Venous Duplex = pending  -Lidocaine Patch and analgesics  -Incentive Spirometer   -Telemetry Monitoring   6/5 PERT team consult appreciated- patient not a candidate for thrombectomy, Echo Pending, starting pt on Eliquis and will monitor     Alcohol Use Disorder with Concerns for Impending Alcohol Withdrawal Syndrome  History of Alcohol Withdrawal Syndrome/Delirium Tremens with withdrawal seizures  -Alcohol Level <10  -UDS pending  -CIWA protocol with PO Ativan  -MVI/Folate/Thiamine      Thrombocytopenia  -Plt Ct = 149 (04/25/24) --> 119 -->  -known history of thrombocytopenia averaging around the 150s - 180s at baseline (possibly in setting of alcohol use disorder)  -no over signs of bleeding or abnormal bleeding  currently   -monitor closely especially in setting of need for heparin and blood thinners in general for extensive pulmonary emboli     Rhabdomyolysis?  -elevated CK possibly in setting of above vs being of limited mobility as of late?  -suspect also why he has become even weaker and with limited ROM/Strength particularly in the lower extremities   -Urinalysis = pending  -CK = 1911 -->1247 -->  -will start of IVFs but monitor fluid status closely in setting of extensive pulmonary emboli regardless of if heart strain is not present at this time     Hypomagnesemia  Hypokalemia  Hyponatremia  -likely poor intake  -replace accordingly  -Sodium = 132 --> 134 -->   -Potassium = 3.3 --> 2.9 -->   -Magnesium = 1.47 -->     Acute on Chronic Deconditioning   Ambulatory Dysfunction  Inability to Care for Self  -PT/OT appreciated  -SW/CM appreciated                  KADEEM DOUGHERTY

## 2024-06-05 NOTE — ED PROVIDER NOTES
Teddy García is a 63 y.o. patient presenting to the ED for generalized weakness.  The patient states he has had multiple remote injuries to his left knee.  He suspects he probably needs a knee replacement and has difficulty ambulating due to this.  Today he was unable to get up to answer his door when a neighbor checked on him prompting a welfare check.  The patient states over the last few days he has had significant difficulty getting him self up off the couch.  He states he has been having pain in his left chest that he believes is due to straining to try and get himself off the couch.  He has not eaten since early this morning due to difficulty getting up.  He denies any recent fever or feeling ill.  He states he feels generally weak and believes this is due to his knee.  He has had a fall a few days ago but denies any significant injuries from this.    Additional History Obtained from: EMS-report the patient was quite disheveled and did not unkept home  Limitations to History: None  ------------------------------------------------------------------------------------------------------------------------------------------  Physical Exam:  Appearance: Alert, cooperative.  Skin: Warm, dry, appropriate color for ethnicity.  Abrasion to left knee.  Eyes: Cornea clear. No scleral icterus or injection.   ENT: Mucous membranes moist.  Pulmonary: No accessory muscle use or stridor. Clear lung sounds bilaterally without rhonchi or wheezing.  Patient does have significant pain with deep inspiration.  Cardiac: Heart sounds regular without murmur. B/L radial pulses full and symmetric.   Abdomen: Soft, not tender.  No rebound or guarding.   Musculoskeletal: No gross deformities.  Minimal swelling to the left knee.  There is an abrasion but the joint is not warm, red, or significantly swollen.  Neurological: Face symmetrical. Voice clear. Appropriately conversant.  Motor and sensation grossly intact x 4  extremities.  Psychiatric: Appropriate mood and affect.    Medical Decision Making:  Chronic Medical Conditions Significantly Affecting Care:  has a past medical history of Alcohol abuse, in remission.    Social Determinants of Health Significantly Affecting Care: Difficulty ambulating, lives alone    Differential Diagnosis Considered but not limited to: Patient has had a fall and is generally weak.  He is tachycardic.  I suspect some of this is due to dehydration in the setting of not being able to get up to get food and water.  Electrolyte disturbance, occult infection are considerations.  The patient does seem to have pleuritic chest pain and states this began after he was straining to get himself up off the couch.  Musculoskeletal cause is a consideration however rib fractures or other injury is also high on the differential.      External Records Reviewed:   I reviewed recent and relevant outside records including:     Independent Interpretation of Studies: The following studies were ordered as part of the emergency department work up and independently interpreted by me. See ED Course for details.    CBC shows slightly low platelets of 119 and is otherwise normal.  CMP shows a sodium of 132 with potassium of 3.3.  Normal renal function.  AST is slightly elevated at 45 with otherwise normal LFTs.  Troponin is normal.  CK level is elevated at 1900.    X-rays of the left knee are negative for evidence of fracture by my interpretation.  Confirmed by radiology.  CT head was performed due to the recent falls and this is negative for any hemorrhage or other acute findings.  CT of the chest, abdomen, pelvis was obtained due to the left lower rib pain.  I was contacted by radiology as the scan does show evidence of bilateral PEs with saddle PE.  There is a left lower lobe infarct.    Patient remains hemodynamically stable.  Since presentation he has not been tachycardic.  His oxygenation has been normal.  I discussed  the results with the patient.  He continues to deny shortness of breath.  He only has chest pain with deep breathing.  He was started on heparin drip.  I discussed the patient with Dr. Wang, hospitalist who will admit the patient for further care.    Diagnoses as of 06/05/24 0352   Multiple pulmonary emboli (Multi)   Generalized weakness   Chronic pain of left knee            Dalia Arredondo, DO  06/05/24 0400      EKG performed at 2217 interpreted by me in real-time however inadvertently left off of initial documentation shows sinus rhythm at a rate of 83.  Intervals are normal.  There is some baseline artifact however no significant ST or T wave changes.  No STEMI.     Dalia Arredondo,   08/01/24 2380

## 2024-06-05 NOTE — PROGRESS NOTES
Occupational Therapy    Evaluation    Patient Name: Teddy García  MRN: [id number]  Today's Date: 6/5/2024  Time Calculation  Start Time: 1340  Stop Time: 1408  Time Calculation (min): 28 min        Assessment:  OT Assessment: OT eval completed. pt appears below his functional baseline. Current level of assist requires MAX A x2 for mobility and SBA to total assist for ADLs. Pt would benefit from further OT to ensure safe return to PLOF  Prognosis: Fair  Barriers to Discharge: Decreased caregiver support  Evaluation/Treatment Tolerance: Patient limited by fatigue, Patient limited by pain  End of Session Communication: Bedside nurse (RN made aware of pt location + pt mobility status)  End of Session Patient Position: Bed, 3 rail up, Alarm on (call light within reach)  OT Assessment Results: Decreased ADL status, Decreased upper extremity strength, Decreased endurance, Decreased functional mobility, Decreased IADLs  Prognosis: Fair  Barriers to Discharge: Decreased caregiver support  Evaluation/Treatment Tolerance: Patient limited by fatigue, Patient limited by pain  Strengths: Housing layout  Barriers to Participation: Support of extended family/friends, Insight into problems, Financial security    Past Medical History:   Diagnosis Date    Alcohol abuse, in remission     History of alcohol abuse     Past Surgical History:   Procedure Laterality Date    OTHER SURGICAL HISTORY  08/26/2019    Tonsillectomy with adenoidectomy       Plan:  Treatment Interventions: ADL retraining, Functional transfer training, UE strengthening/ROM, Endurance training, Cognitive reorientation, Patient/family training, Equipment evaluation/education, Neuromuscular reeducation, Compensatory technique education  OT Frequency: 3 times per week  OT Discharge Recommendations: Moderate intensity level of continued care  Equipment Recommended upon Discharge:  (TBD)  OT Recommended Transfer Status: Maximum assist, Assist of 2  OT - OK to Discharge:  Yes (ok to dc to next level of care once medically cleared)  Treatment Interventions: ADL retraining, Functional transfer training, UE strengthening/ROM, Endurance training, Cognitive reorientation, Patient/family training, Equipment evaluation/education, Neuromuscular reeducation, Compensatory technique education    Subjective   Current Problem:  1. Multiple pulmonary emboli (Multi)  Lower extremity venous duplex bilateral    Lower extremity venous duplex bilateral    Transthoracic Echo (TTE) Complete    Transthoracic Echo (TTE) Complete    CANCELED: Transthoracic Echo (TTE) Complete    CANCELED: Transthoracic Echo (TTE) Complete      2. Generalized weakness        3. Chronic pain of left knee        4. Dyspnea on exertion  Lower extremity venous duplex bilateral    Lower extremity venous duplex bilateral    Transthoracic Echo (TTE) Complete    Transthoracic Echo (TTE) Complete    CANCELED: Transthoracic Echo (TTE) Complete    CANCELED: Transthoracic Echo (TTE) Complete        General:  General  Reason for Referral: Impaired ADLs and Functional mobility. 62 y/o male  presented to ED  w/ weakness and falls, states he could not stand up from couch--stuck there a few days. (DX: Acute bilateral pulmonary emboli without cor pulmonale Acute hypoxic respiratory failure, acute occlusive thrombus Hypokalemia present on admission Acute traumatic rhabdomyolysis, Alcohol abuse in remission)  Referred By: GEOVANI Wang (Impaired ADLs and Functional mobility)  Past Medical History Relevant to Rehab: PMH: EtOH abuse with h/o withdrawal seizures / DTs, tobacco use disorder, history of Left Toe Ulcer/Infection in 2022 and 2023, anxiety and depression  Missed Visit: Yes  Missed Visit Reason: Patient placed on medical hold (Pt potassium at 2.9. Per MD note this AM patient had become increasingly tachypneic And hypoxic to the mid 80s at which time she was placed on 2 L by nasal cannula and increased to 4 L to maintain at about  88%)  Family/Caregiver Present: No  Co-Treatment: PT  Co-Treatment Reason: Co-eval to maximize safety with mobility while focusing on disipline specific goals  Prior to Session Communication: Bedside nurse (RN approved therapy)  Patient Position Received: Bed, 3 rail up, Alarm on (Sleepin gujovita arrival to room, Purewick, gowned, IV, tele, pulse ox monitor, BP uff, O2 via NC)  General Comment: Pt seen in ICU bed 9. Pt required cues to keep eyes open initially however alertness improve during mobility . Pt answering questions very vague and mumbling at times diffcult to comprehend intermittently  Precautions:  Medical Precautions: Fall precautions, Oxygen therapy device and L/min  Precautions Comment: CIWA protocol. RLE DVT's, multiple PE on Eliquis  Vital Signs:  SpO2:  (99)  BP:  (at rest  122 /73 after activity  122/101)  Patient Position: Lying  Pain:  Pain Assessment  Pain Assessment: 0-10  Pain Score: 5 - Moderate pain  Pain Location: Knee (bilateral thighs and Left UE  with  movement. chornic left knee pain)  Pain Interventions: Repositioned, Rest    Objective   Cognition:  Overall Cognitive Status: Impaired  Arousal/Alertness: Delayed responses to stimuli  Orientation Level: Disoriented to situation  Attention: Exceptions to WFL  Sustained Attention: Impaired (lethargic)  Safety/Judgement: Exceptions to WFL  Complex Functional Tasks: Moderate  Novel Situations: Moderate  Routine Tasks: Moderate  Unable to Self-Monitor and Self-Correct Consistently: Moderate  Insight: Severe  Task Initiation: WFL  Processing Speed: Delayed           Home Living:  Type of Home: Apartment  Lives With: Alone  Home Adaptive Equipment: Walker rolling or standard (RW)  Home Layout: One level (apt on first floor)  Home Access: Level entry  Bathroom Shower/Tub: Walk-in shower  Home Living Comments: h/o of chronic homelessness (unsure of accuracy of PLOF pt provided)  Prior Function:  ADL Assistance: Independent (pt reports)  Homemaking  Assistance: Independent (per pt report)  Ambulatory Assistance: Needs assistance  Transfers: Assistive device  Gait: Assistive device  Prior Function Comments: uses public transportation, (-) driving  IADL History:     ADL:  Eating Assistance: Stand by (per RN required set up only for meal)  Grooming Assistance: Moderate (anticipate)  Bathing Assistance: Maximal (anticipate bed level)  UE Dressing Assistance: Maximal (anticipate bed level)  LE Dressing Assistance:  (max A x2 for donning socks bed level)  Toileting Assistance with Device: Total ((+) Purewick. anticipate total assist for clothing mgmt and hygiene)  Activity Tolerance:  Endurance: Decreased tolerance for upright activites  Bed Mobility/Transfers: Bed Mobility  Bed Mobility:  (supine <> sit  MAX A x 2  with HOB elevated)    Transfers  Transfer:  (sit<> stand MAX A x2  + bed support ,cues for safety, establishing correct posture in standing . Limited standing tolerance 2/2 to leg pain, poor standing tolerance)      Functional Mobility:     Sitting Balance:     Standing Balance:  Static Standing Balance  Static Standing-Balance Support: Bilateral upper extremity supported  Static Standing-Level of Assistance: Maximum assistance (+2)   Modalities:     Vision:Vision - Basic Assessment  Current Vision: No visual deficits  Sensation:  Sensation Comment: no c/o  Strength:  Strength Comments: left shoulder   2 to 2(+)/5, 3 /5 distally, RUE 3 /5 grossly  Perception:     Coordination:      Hand Function:  Gross Grasp: Functional  Coordination: Functional  Extremities: RUE   RUE : Within Functional Limits and LUE   LUE: Within Functional Limits (left shoulder limited 2/2 to pain during flexion in supine position)        Outcome Measures:[facility] Daily Activity  Putting on and taking off regular lower body clothing: Total  Bathing (including washing, rinsing, drying): Total  Putting on and taking off regular upper body clothing: Total  Toileting, which includes using  toilet, bedpan or urinal: Total  Taking care of personal grooming such as brushing teeth: A lot  Eating Meals: A little  Daily Activity - Total Score: 9        Education Documentation  Body Mechanics, taught by Neha Roberts OT at 6/5/2024  5:19 PM.  Learner: Patient  Readiness: Acceptance  Method: Demonstration  Response: Needs Reinforcement    Precautions, taught by Neha Roberts OT at 6/5/2024  5:19 PM.  Learner: Patient  Readiness: Acceptance  Method: Demonstration  Response: Needs Reinforcement    ADL Training, taught by Neha Roberts OT at 6/5/2024  5:19 PM.  Learner: Patient  Readiness: Acceptance  Method: Demonstration  Response: Needs Reinforcement    Education Comments  No comments found.        OP EDUCATION:       Goals:  Encounter Problems       Encounter Problems (Active)       ADLs       Patient will perform UB and LB bathing  with minimal assist  and moderate assist level of assistance .       Start:  06/05/24    Expected End:  06/19/24               TRANSFERS       Patient will complete sit to stand transfer with moderate assist level of assistance and front wheeled walker in order to improve safety and prepare for out of bed mobility.       Start:  06/05/24    Expected End:  06/19/24

## 2024-06-05 NOTE — PROGRESS NOTES
Physical Therapy    Physical Therapy Evaluation    Patient Name: Teddy García  MRN: [id number]  Today's Date: 6/5/2024   Time Calculation  Start Time: 1339  Stop Time: 1408  Time Calculation (min): 29 min  09/09-A    Assessment/Plan   PT Assessment  PT Assessment Results: Decreased strength, Decreased mobility, Decreased cognition, Impaired judgement, Decreased safety awareness (altered mental status)  Rehab Prognosis: Good  Barriers to Discharge: altered mental status, unable to walk yet  Evaluation/Treatment Tolerance: Patient tolerated treatment well  End of Session Communication: Bedside nurse  Assessment Comment: pt demos profound weakness in legs, complicated by BOTH chronic L knee pain, and RLE vascular pain with DVT's. Recommend MOD intensity Rehab for Skilled PT to regain mobility needed for living alone.  End of Session Patient Position: Bed, 3 rail up, Alarm on  IP OR SWING BED PT PLAN  Inpatient or Swing Bed: Inpatient  PT Plan  Treatment/Interventions: Bed mobility, Transfer training, Gait training, Neuromuscular re-education, Therapeutic exercise, Therapeutic activity  PT Plan: Skilled PT  PT Frequency: 4 times per week  PT Discharge Recommendations: Moderate intensity level of continued care, 24 hr supervision due to cognition  PT Recommended Transfer Status: Assist x2  PT - OK to Discharge: Yes (to next level of care when cleared by medical team)    Subjective     Patient Active Problem List   Diagnosis    Bipolar disorder, most recent episode depressed (Multi)    Alcohol use, unspecified, uncomplicated    Anxiety disorder, unspecified    Depression with anxiety    Multiple pulmonary emboli (Multi)       General Visit Information:  General  Reason for Referral: weakness and falls, states he could not stand up from couch--stuck there a few days. DX: #Acute bilateral pulmonary emboli without cor pulmonale  #Acute hypoxic respiratory failure secondary to #1  #acute occlusive thrombus from the femoral  "vein through the popliteal vein on the right  #Hypokalemia present on admission  #Acute traumatic rhabdomyolysis  #Alcohol abuse in remission  Referred By: JAMIL CRAIG. PT FOR IMPAIRED MOBILITY/COGNITION, GAIT TRAINING  Past Medical History Relevant to Rehab: alcohol abuse  Family/Caregiver Present: No  Co-Treatment: OT  Co-Treatment Reason: optimize pt safety in ICU setting, while focus discipline specific goals  Prior to Session Communication: Bedside nurse (approved PT)  Patient Position Received: Bed, 3 rail up, Alarm on  General Comment: pt seen in ICU 1009 with IV, O2, Purewick. Initially lethargic, did increase alertness with activity, could not converse, only provide one words answers or no reply/delayed reply, mumbling (some speech unintelligible)    Home Living:  Home Living  Type of Home: Apartment  Lives With: Alone  Home Adaptive Equipment: Walker rolling or standard (rollator)  Home Layout: One level  Home Access: Level entry  Home Living Comments: Patient lives alone in a house that was arranged for him by [place]  Clarissa. He's been there for about four months. Per Clarissa, patient has a history of chronic homelessness.    Prior Level of Function:  Prior Function Per Pt/Caregiver Report  Level of Price: Other (Comment) (jaime)  Receives Help From: Other (Comment) (jaime)  Ambulatory Assistance: Needs assistance  Transfers: Assistive device  Gait: Assistive device  Prior Function Comments: uses ROLLATOR    Precautions:  Precautions  Medical Precautions: Fall precautions, Oxygen therapy device and L/min  Precautions Comment: CIWA protocol. RLE DVT's, multiple PE    Vital Signs:  Vital Signs  SpO2: 99 %  BP: 122/73 (before PT, 122/101 after RTB)  Patient Position: Lying  Objective     Pain:  Pain Assessment  Pain Assessment: 0-10  Pain Score: 5 - Moderate pain  Pain Location: Knee (\"thighs\")  Effect of Pain on Daily Activities: repeats \"I couldnt stand from couch\"  Pain Interventions: " Rest  Multiple Pain Sites:  (chronic pain LEFT KNEE)    Cognition:  Cognition  Overall Cognitive Status: Impaired  Arousal/Alertness: Delayed responses to stimuli  Orientation Level: Disoriented to situation (lethargy)    General Assessments:  General Observation  General Observation: transfer supine to dangle: worked on obtaining sitting balance and improved alertness. Stood EOB, attempted side stepping to HOB but pt unable. RTB   Activity Tolerance  Endurance: Decreased tolerance for upright activites  Activity Tolerance Comments: cannot geovanny standing on legs d/t pain and lethargy     Dynamic Sitting Balance  Dynamic Sitting-Comments: fair+  Dynamic Standing Balance  Dynamic Standing-Comments: poor max x2 support    Functional Assessments:     Bed Mobility 1  Bed Mobility 1: Supine to sitting, Sitting to supine  Level of Assistance 1: Maximum assistance, +2, Maximum verbal cues, Maximum tactile cues  Transfer 1  Transfer From 1: Bed to  Technique 1: Sit to stand  Transfer Device 1:  (tall chairback)  Transfer Level of Assistance 1: Maximum assistance, +2  Ambulation/Gait Training 1  Device 1:  (tall chairback)  Assistance 1: Maximum assistance (x2)  Quality of Gait 1:  (could not take any steps to HOB on 6/5 despite PT/OT facilitation)  Comments/Distance (ft) 1: 0  Stairs  Stairs: No       Extremity/Trunk Assessments:        RLE   RLE : Exceptions to WFL  AROM RLE (degrees)  RLE AROM Comment: grossly 50% with pain guarding (DVT's) groin/thigh area  Strength RLE  RLE Overall Strength: Other (Comment) (grossly 2+/5)  LLE   LLE : Exceptions to WFL  AROM LLE (degrees)  LLE AROM Comment: WFL  Strength LLE  LLE Overall Strength: Greater than or equal to 3/5 as evidenced by functional mobility    Outcome Measures:     [facility] Basic Mobility  Turning from your back to your side while in a flat bed without using bedrails: A lot  Moving from lying on your back to sitting on the side of a flat bed without using bedrails: A  lot  Moving to and from bed to chair (including a wheelchair): Total  Standing up from a chair using your arms (e.g. wheelchair or bedside chair): A lot  To walk in hospital room: Total  Climbing 3-5 steps with railing: Total  Basic Mobility - Total Score: 9     Goals:  Encounter Problems       Encounter Problems (Active)       Mobility       STG - Patient will ambulate 5-10 ft with FWW (uses ROLLATOR at home) to access chair/BSC to reduce bedrest       Start:  06/05/24    Expected End:  06/19/24               PT Transfers       STG - Patient will transfer from one surface to another with reduced caregiver nassist       Start:  06/05/24    Expected End:  06/19/24               strength       STG-pt will perform 10-20 reps AROM BLE (caution RLE DVT's)       Start:  06/05/24    Expected End:  06/19/24                 Education Documentation  Mobility Training, taught by Nena Landin, PT at 6/5/2024  5:01 PM.  Learner: Patient  Readiness: Acceptance  Method: Explanation, Demonstration  Response: No Evidence of Learning    Education Comments  No comments found.

## 2024-06-05 NOTE — CONSULTS
"Critical Care Medicine Consult      Reason For Consult  \"Extensive PE, occlusive DVT right lower extremity\"    History Of Present Illness  Teddy García is a 63 y.o. male admitted with chief complaint of left-sided chest pain and shortness of breath.  Neighbors went to check on the patient who could not open his door.  He was brought to the emergency department and CT chest showed pulmonary emboli.  Patient has not been hemodynamically unstable.  He thought the chest pain was due to a muscle strain.  Workup in the emergency department disclosed a normal BNP and a normal troponin.  Patient is currently resting comfortably in the intensive care unit asking how to turn on the television.  He has no other complaints presently.  He denies a history of cancer.  He denies a history of prior clotting.  He denies a history of MI or stroke.      Patient has a history of depression.    Past Medical History:   Diagnosis Date    Alcohol abuse, in remission     History of alcohol abuse     Past Surgical History:   Procedure Laterality Date    OTHER SURGICAL HISTORY  08/26/2019    Tonsillectomy with adenoidectomy     Medications Prior to Admission   Medication Sig Dispense Refill Last Dose    citalopram (CeleXA) 20 mg tablet Take 1 tablet (20 mg) by mouth once daily. (Patient not taking: Reported on 6/5/2024) 30 tablet 0 Not Taking    escitalopram (Lexapro) 20 mg tablet Take 1 tablet (20 mg) by mouth once daily.       hydrOXYzine pamoate (VistariL) 50 mg capsule Take 1 capsule (50 mg) by mouth 2 times a day as needed for anxiety. (Patient taking differently: Take 1 capsule (50 mg) by mouth 2 times a day.) 30 capsule 0     OLANZapine (ZyPREXA) 5 mg tablet Take 1 tablet (5 mg) by mouth 2 times a day.       traZODone (Desyrel) 100 mg tablet Take 1 tablet (100 mg) by mouth once daily at bedtime. 30 tablet 0      Allergies:  Hydralazine  Social History     Tobacco Use    Smoking status: Every Day     Current packs/day: 1.00     Types: " Cigarettes    Smokeless tobacco: Never   Substance Use Topics    Alcohol use: Yes     Alcohol/week: 6.0 standard drinks of alcohol     Types: 6 Cans of beer per week     Comment: pint of whiskey    Drug use: Yes     Types: Marijuana     No family history on file.  No familial thrombophilia.    Scheduled Medications:   apixaban, 10 mg, oral, BID   Followed by  [START ON 6/12/2024] apixaban, 5 mg, oral, BID  folic acid, 1 mg, oral, Daily  lidocaine, 1 patch, transdermal, q24h  multivitamin with minerals, 1 tablet, oral, Daily  nicotine, 1 patch, transdermal, q24h   Followed by  [START ON 7/17/2024] nicotine, 1 patch, transdermal, q24h   Followed by  [START ON 7/31/2024] nicotine, 1 patch, transdermal, q24h  perflutren protein A microsphere, 0.5 mL, intravenous, Once in imaging  [START ON 6/8/2024] thiamine, 100 mg, oral, Daily  thiamine, 100 mg, intravenous, Daily         Continuous Medications:   heparin, 0-4,500 Units/hr, Last Rate: 1,600 Units/hr (06/05/24 0731)  lactated Ringer's, 100 mL/hr, Last Rate: 100 mL/hr (06/05/24 0715)         PRN Medications:   PRN medications: acetaminophen, heparin, LORazepam **OR** LORazepam **OR** LORazepam    Review of Systems:  Review of Systems   Constitutional:  Negative for fever and unexpected weight change.   HENT:  Negative for congestion.    Eyes:  Negative for visual disturbance.   Respiratory:  Positive for shortness of breath.    Cardiovascular:  Positive for chest pain.   Gastrointestinal:  Negative for abdominal pain.   Musculoskeletal:  Negative for back pain.   Neurological:  Negative for seizures.   All other systems reviewed and are negative.       Objective   Vitals:  Most Recent:  Vitals:    06/05/24 0830   BP: 118/76   Pulse: 74   Resp: (!) 24   Temp:    SpO2: 97%       24hr Min/Max:  Temp  Min: 36.9 °C (98.4 °F)  Max: 36.9 °C (98.4 °F)  Pulse  Min: 70  Max: 101  BP  Min: 108/72  Max: 144/82  Resp  Min: 17  Max: 30  SpO2  Min: 91 %  Max: 97  %          Intake/Output Summary (Last 24 hours) at 6/5/2024 1014  Last data filed at 6/5/2024 0102  Gross per 24 hour   Intake 1000 ml   Output --   Net 1000 ml           Physical exam:    Physical Exam  Vitals reviewed.   Constitutional:       Appearance: Normal appearance. He is not ill-appearing.      Comments: Flat affect   HENT:      Head: Normocephalic.      Nose: No congestion.      Mouth/Throat:      Mouth: Mucous membranes are moist.   Eyes:      General: No scleral icterus.  Cardiovascular:      Rate and Rhythm: Normal rate and regular rhythm.      Heart sounds: Normal heart sounds. No murmur heard.  Pulmonary:      Effort: No respiratory distress.      Comments: There is a left lateral chest pleural friction rub  Abdominal:      Palpations: Abdomen is soft. There is no mass.      Tenderness: There is no abdominal tenderness.   Musculoskeletal:      Cervical back: Neck supple. No rigidity.      Right lower leg: No edema.      Left lower leg: No edema.   Skin:     General: Skin is dry.      Findings: No rash.   Neurological:      General: No focal deficit present.      Mental Status: He is alert and oriented to person, place, and time.          Lab/Radiology/Diagnostic Review:  Results for orders placed or performed during the hospital encounter of 06/04/24 (from the past 24 hour(s))   CBC and Auto Differential   Result Value Ref Range    WBC 6.4 4.4 - 11.3 x10*3/uL    nRBC 0.0 0.0 - 0.0 /100 WBCs    RBC 4.70 4.50 - 5.90 x10*6/uL    Hemoglobin 14.9 13.5 - 17.5 g/dL    Hematocrit 41.8 41.0 - 52.0 %    MCV 89 80 - 100 fL    MCH 31.7 26.0 - 34.0 pg    MCHC 35.6 32.0 - 36.0 g/dL    RDW 13.9 11.5 - 14.5 %    Platelets 119 (L) 150 - 450 x10*3/uL    Neutrophils % 67.2 40.0 - 80.0 %    Immature Granulocytes %, Automated 0.3 0.0 - 0.9 %    Lymphocytes % 22.3 13.0 - 44.0 %    Monocytes % 9.1 2.0 - 10.0 %    Eosinophils % 0.5 0.0 - 6.0 %    Basophils % 0.6 0.0 - 2.0 %    Neutrophils Absolute 4.27 1.20 - 7.70 x10*3/uL     Immature Granulocytes Absolute, Automated 0.02 0.00 - 0.70 x10*3/uL    Lymphocytes Absolute 1.42 1.20 - 4.80 x10*3/uL    Monocytes Absolute 0.58 0.10 - 1.00 x10*3/uL    Eosinophils Absolute 0.03 0.00 - 0.70 x10*3/uL    Basophils Absolute 0.04 0.00 - 0.10 x10*3/uL   Comprehensive metabolic panel   Result Value Ref Range    Glucose 139 (H) 74 - 99 mg/dL    Sodium 132 (L) 136 - 145 mmol/L    Potassium 3.3 (L) 3.5 - 5.3 mmol/L    Chloride 98 98 - 107 mmol/L    Bicarbonate 24 21 - 32 mmol/L    Anion Gap 13 10 - 20 mmol/L    Urea Nitrogen 15 6 - 23 mg/dL    Creatinine 0.72 0.50 - 1.30 mg/dL    eGFR >90 >60 mL/min/1.73m*2    Calcium 9.3 8.6 - 10.3 mg/dL    Albumin 4.0 3.4 - 5.0 g/dL    Alkaline Phosphatase 68 33 - 136 U/L    Total Protein 7.7 6.4 - 8.2 g/dL    AST 45 (H) 9 - 39 U/L    Bilirubin, Total 1.2 0.0 - 1.2 mg/dL    ALT 18 10 - 52 U/L   Creatine Kinase   Result Value Ref Range    Creatine Kinase 1,911 (H) 0 - 325 U/L   Troponin I, High Sensitivity   Result Value Ref Range    Troponin I, High Sensitivity 15 0 - 20 ng/L   Alcohol   Result Value Ref Range    Alcohol <10 <=10 mg/dL   Lipid Panel   Result Value Ref Range    Cholesterol 156 0 - 199 mg/dL    HDL-Cholesterol 84.3 mg/dL    Cholesterol/HDL Ratio 1.9     LDL Calculated 58 <=99 mg/dL    VLDL 13 0 - 40 mg/dL    Triglycerides 67 0 - 149 mg/dL    Non HDL Cholesterol 72 0 - 149 mg/dL   ECG 12 lead   Result Value Ref Range    Ventricular Rate 83 BPM    Atrial Rate 80 BPM    LA Interval 67 ms    QRS Duration 105 ms    QT Interval 519 ms    QTC Calculation(Bazett) 610 ms    P Axis 102 degrees    R Axis 79 degrees    T Axis 52 degrees    QRS Count 13 beats    Q Onset 253 ms    T Offset 513 ms    QTC Fredericia 578 ms   B-Type Natriuretic Peptide   Result Value Ref Range    BNP 27 0 - 99 pg/mL   aPTT - baseline   Result Value Ref Range    aPTT 29 27 - 38 seconds   Protime-INR   Result Value Ref Range    Protime 12.8 9.8 - 12.8 seconds    INR 1.1 0.9 - 1.1   CBC    Result Value Ref Range    WBC 6.0 4.4 - 11.3 x10*3/uL    nRBC 0.0 0.0 - 0.0 /100 WBCs    RBC 4.27 (L) 4.50 - 5.90 x10*6/uL    Hemoglobin 13.7 13.5 - 17.5 g/dL    Hematocrit 38.5 (L) 41.0 - 52.0 %    MCV 90 80 - 100 fL    MCH 32.1 26.0 - 34.0 pg    MCHC 35.6 32.0 - 36.0 g/dL    RDW 14.0 11.5 - 14.5 %    Platelets 109 (L) 150 - 450 x10*3/uL   Renal Function Panel   Result Value Ref Range    Glucose 159 (H) 74 - 99 mg/dL    Sodium 134 (L) 136 - 145 mmol/L    Potassium 2.9 (LL) 3.5 - 5.3 mmol/L    Chloride 100 98 - 107 mmol/L    Bicarbonate 26 21 - 32 mmol/L    Anion Gap 11 10 - 20 mmol/L    Urea Nitrogen 14 6 - 23 mg/dL    Creatinine 0.62 0.50 - 1.30 mg/dL    eGFR >90 >60 mL/min/1.73m*2    Calcium 8.5 (L) 8.6 - 10.3 mg/dL    Phosphorus 2.8 2.5 - 4.9 mg/dL    Albumin 3.4 3.4 - 5.0 g/dL   Magnesium   Result Value Ref Range    Magnesium 1.47 (L) 1.60 - 2.40 mg/dL   TSH with reflex to Free T4 if abnormal   Result Value Ref Range    Thyroid Stimulating Hormone 1.51 0.44 - 3.98 mIU/L   Creatine Kinase   Result Value Ref Range    Creatine Kinase 1,247 (H) 0 - 325 U/L   Hemoglobin A1C   Result Value Ref Range    Hemoglobin A1C 5.8 (H) see below %    Estimated Average Glucose 120 Not Established mg/dL   Heparin Assay, UFH   Result Value Ref Range    Heparin Unfractionated 0.9 See Comment Below for Therapeutic Ranges IU/mL   Urinalysis with Reflex Culture and Microscopic   Result Value Ref Range    Color, Urine Light-Orange (N) Light-Yellow, Yellow, Dark-Yellow    Appearance, Urine Clear Clear    Specific Gravity, Urine 1.038 (N) 1.005 - 1.035    pH, Urine 6.0 5.0, 5.5, 6.0, 6.5, 7.0, 7.5, 8.0    Protein, Urine 10 (TRACE) NEGATIVE, 10 (TRACE), 20 (TRACE) mg/dL    Glucose, Urine Normal Normal mg/dL    Blood, Urine NEGATIVE NEGATIVE    Ketones, Urine TRACE (A) NEGATIVE mg/dL    Bilirubin, Urine NEGATIVE NEGATIVE    Urobilinogen, Urine Normal Normal mg/dL    Nitrite, Urine NEGATIVE NEGATIVE    Leukocyte Esterase, Urine NEGATIVE  NEGATIVE   Drug Screen, Urine With Reflex to Confirmation   Result Value Ref Range    Amphetamine Screen, Urine Presumptive Negative Presumptive Negative    Barbiturate Screen, Urine Presumptive Negative Presumptive Negative    Benzodiazepines Screen, Urine Presumptive Negative Presumptive Negative    Cannabinoid Screen, Urine Presumptive Positive (A) Presumptive Negative    Cocaine Metabolite Screen, Urine Presumptive Negative Presumptive Negative    Fentanyl Screen, Urine Presumptive Negative Presumptive Negative    Opiate Screen, Urine Presumptive Negative Presumptive Negative    Oxycodone Screen, Urine Presumptive Negative Presumptive Negative    PCP Screen, Urine Presumptive Negative Presumptive Negative    Methadone Screen, Urine Presumptive Negative Presumptive Negative   Urinalysis Microscopic   Result Value Ref Range    WBC, Urine 1-5 1-5, NONE /HPF    RBC, Urine NONE NONE, 1-2, 3-5 /HPF    Bacteria, Urine 1+ (A) NONE SEEN /HPF    Hyaline Casts, Urine 2+ (A) NONE /LPF   Heparin Assay, UFH   Result Value Ref Range    Heparin Unfractionated 0.6 See Comment Below for Therapeutic Ranges IU/mL     ECG 12 lead    Result Date: 6/5/2024  Sinus rhythm Atrial premature complex Short RI interval Consider right atrial enlargement Inferior infarct, old Prolonged QT interval    Lower extremity venous duplex bilateral    Result Date: 6/5/2024  Interpreted By:  Franki Cardozo, STUDY: [facility] LOWER EXTREMITY VENOUS DUPLEX BILATERAL; 6/5/2024 7:34 am   INDICATION: PE, DVT?   COMPARISON: None   ACCESSION NUMBER(S): [id number]   ORDERING CLINICIAN: GELA CRAIG   TECHNIQUE: Static grayscale, color Doppler, and spectral Doppler sonographic images of the bilateral lower extremities were obtained for duplex evaluation.   FINDINGS: LEFT LOWER EXTREMITY: There is no evidence of deep venous thrombus in the visualized portions of the common femoral vein, femoral vein, or popliteal vein. Respiratory variation and augmentation to calf  pressure is noted. The visualized portion of the posterior tibial and peroneal veins are unremarkable.   RIGHT LOWER EXTREMITY: Occlusive thrombus extending from the common femoral vein through the popliteal vein. Suboptimal calf vein evaluation.       1. Occlusive thrombus from the femoral vein through the popliteal vein on the right.   Signed by: Franki Cardozo 6/5/2024 7:39 AM Dictation workstation:   [id number]    CT chest abdomen pelvis w IV contrast    Result Date: 6/5/2024  Interpreted By:  Lane Ayala, STUDY: CT CHEST ABDOMEN PELVIS W IV CONTRAST;  6/5/2024 12:11 am   INDICATION: Signs/Symptoms:falls, left rib/upper abd pain.   COMPARISON: 06/27/2022   ACCESSION NUMBER(S): [id number]   ORDERING CLINICIAN: COTY OREILLY   TECHNIQUE: Contiguous axial images of the chest, abdomen, and pelvis were obtained after the intravenous administration of iodinated contrast. Coronal and sagittal reformatted images were reconstructed from the axial data.   FINDINGS: CT CHEST:   MEDIASTINUM AND LYMPH NODES:  The esophageal wall appears within normal limits.  No enlarged intrathoracic or axillary lymph nodes by imaging criteria. No pneumomediastinum.   VESSELS: There is an acute saddle pulmonary embolus that extends into the bilateral lower lobe pulmonary arteries and their segmental and subsegmental branches. There is a pulmonary embolism within the right upper lobar pulmonary artery. The main pulmonary artery is borderline dilated at 3 cm. The aorta is normal in caliber without evidence of acute traumatic injury. There is mild aortic atherosclerosis.   HEART: There is no evidence of right heart strain. The RV-LV ratio is < 1.  Moderate to severe coronary artery calcifications. No significant pericardial effusion.   LUNG, AIRWAYS, PLEURA: There is focal subpleural consolidative opacity involving the lateral segment of the left lower lobe with surrounding ground-glass density consistent with a pulmonary infarction. There  is additional area of probable infarct with mild atelectasis in the lingula. The right lung is clear. No pneumothorax. There is a left upper lobe calcified granuloma.   CHEST WALL SOFT TISSUES: No discernible acute abnormality.   OSSEOUS STRUCTURES: No acute osseous abnormality. Partially visualized internal fixation hardware in the left proximal humerus. There redemonstration of chronic nondisplaced fractures of the lateral left 8th and 9th ribs.     CT ABDOMEN/PELVIS:   ABDOMINAL WALL: No significant abnormality.   LIVER: No acute parenchymal injury or suspicious lesion. Diffuse hypoattenuation of the liver consistent with steatosis.   BILE DUCTS: No significant intrahepatic or extrahepatic dilatation.   GALLBLADDER: Cholelithiasis. No discernible gallbladder wall thickening, pericholecystic fluid, or stranding.   PANCREAS: No significant abnormality.   SPLEEN: No significant abnormality.   ADRENALS: No significant abnormality.   KIDNEYS, URETERS, BLADDER:  No significant abnormality.   REPRODUCTIVE ORGANS: No significant abnormality.   VESSELS: Moderate aortic atherosclerosis without AAA.   RETROPERITONEUM/LYMPH NODES: No enlarged lymph nodes. No acute retroperitoneal abnormality.   BOWEL/MESENTERY/PERITONEUM: There is colonic diverticulosis without evidence for diverticulitis.  No inflammatory bowel wall thickening or dilatation. Normal appendix.   No ascites, free air, or fluid collection.     MUSCULOSKELETAL: No acute osseous abnormality.       Acute saddle pulmonary embolus with large clot burden or extending into the bilateral lower lobar pulmonary arteries and their segmental and subsegmental branches, as well as into the right upper lobar pulmonary artery. No evidence of right heart strain.   Pulmonary parenchymal infarct in the left lower lobe and lingula.   No acute left rib fractures or other acute traumatic injury in the chest, abdomen, or pelvis.   Colonic diverticulosis, hepatic steatosis,  cholelithiasis.   MACRO: Lane Ayala discussed the significance and urgency of this critical finding by ERIK PABLO with  COTY OREILLY on 6/5/2024 at 12:33 am.  (**-RCF-**) Findings:  See findings.   Signed by: Lane Ayala 6/5/2024 12:46 AM Dictation workstation:   [id number]    CT head wo IV contrast    Result Date: 6/5/2024  Interpreted By:  Lane Ayala, STUDY: CT HEAD WO IV CONTRAST;  6/5/2024 12:11 am   INDICATION: Signs/Symptoms:fall.   COMPARISON: 04/17/2023   ACCESSION NUMBER(S): [id number]   ORDERING CLINICIAN: COTY OREILLY   TECHNIQUE: Noncontrast axial CT images of head were obtained with coronal and sagittal reconstructed images.   FINDINGS: BRAIN PARENCHYMA: There is extensive chronic small vessel ischemic disease including chronic lacunar infarcts in the right corona radiata and basal ganglia, subinsular white matter, right thalamus, sarah, and diffusely throughout the periventricular white matter. No acute intraparenchymal hemorrhage or parenchymal evidence of acute large territory ischemic infarct. No mass-effect.   VENTRICLES and EXTRA-AXIAL SPACES:  No acute extra-axial or intraventricular hemorrhage. No effacement of cerebral sulci. Ventricles and sulci are age-concordant.   PARANASAL SINUSES/MASTOIDS:  No hemorrhage or air-fluid levels within the visualized paranasal sinuses. The mastoids are well aerated.   CALVARIUM/ORBITS:  No skull fracture.  The orbits and globes are intact to the extent visualized.   EXTRACRANIAL SOFT TISSUES: No discernible abnormality.       1. No acute intracranial abnormality.   2. Stable, severe burden of chronic small vessel ischemic disease, primarily in the right basal ganglia.   MACRO: None.   Signed by: Lane Ayala 6/5/2024 12:30 AM Dictation workstation:   [id number]    XR knee left 1-2 views    Result Date: 6/5/2024  Interpreted By:  Lane Ayala, STUDY: XR KNEE LEFT 1-2 VIEWS; ;  6/4/2024 11:24 pm   INDICATION: Signs/Symptoms:fall.    COMPARISON: 04/17/2023   ACCESSION NUMBER(S): [id number]   ORDERING CLINICIAN: COTY OREILLY   FINDINGS: There is redemonstration of severe joint space loss in the lateral compartment and moderate degenerative change of the medial and patellofemoral compartments. There is a small joint effusion. There is no acute fracture or malalignment.       No acute osseous abnormality of the left knee.   Tricompartmental osteoarthrosis severely affecting the lateral compartment with small joint effusion.   MACRO: None.   Signed by: Lane Ayala 6/5/2024 12:27 AM Dictation workstation:   [id number]      Assessment/Plan   Principal Problem:    Multiple pulmonary emboli (Multi)    VTE.  Case personally discussed with Dr. Cardozo from interventional radiology who is on-call for the PERT team today.  No plans for intervention.  Patient does not meet criteria given hemodynamic stability, normal troponin, normal BNP, no evidence of right heart strain by CT.  Will check echo.  Supplemental oxygen.  Analgesia.  Incentive spirometry.  Change heparin to Eliquis.  44 minutes of critical care service time delivered.  Patient discussed with Dr. Tejeda.    History of EtOH use.  Supplement magnesium and potassium.  Watch for withdrawal.    Depression.    Tobacco use disorder.  Patient has a patch in place.    Elevated CK.  Continue IV fluid.    Low platelets, probably relates to alcohol use.  Track labs at least daily.    Dragon dictation software was used to dictate this note and thus there may be minor errors in translation/transcription including garbled speech or misspellings. Please contact for clarification if needed. Inpatient consult to Intensivist  Consult performed by: Lane Valera MD  Consult ordered by: DO Lane Gorman MD

## 2024-06-05 NOTE — PROGRESS NOTES
History and chart reviewed.  Patient seen and examined.    CONSTITUTIONAL - alert, in no acute distress, not ill-appearing  CHEST -decreased air entry bilaterally, no obvious wheeze or crackles  CARDIAC - regular rate and regular rhythm, no murmur  ABDOMEN - no organomegaly, soft, nontender, nondistended, normal bowel sounds, no guarding/rebound/rigidity  EXTREMITIES - no edema, no deformities  NEUROLOGICAL - alert, oriented x3 and no acute focal signs  PSYCHIATRIC - alert, pleasant and cordial, age-appropriate      #Acute bilateral pulmonary emboli without cor pulmonale  #Acute hypoxic respiratory failure secondary to #1  #acute occlusive thrombus from the femoral vein through the popliteal vein on the right  #Hypokalemia present on admission  #Acute traumatic rhabdomyolysis  #Alcohol abuse in remission    IR recommendations appreciated, no thrombectomy or thrombolysis required  Critical care recommendation much appreciated  Currently on heparin drip then we will start on Eliquis.      Edmundo Tejeda MD, MRCP

## 2024-06-05 NOTE — PROGRESS NOTES
Occupational Therapy                 Therapy Communication Note    Patient Name: Teddy García  MRN: [id number]  Today's Date: 6/5/2024     Discipline: Occupational Therapy    Missed Visit Reason: Missed Visit Reason: Patient placed on medical hold (Pt potassium at 2.9. Per MD note this AM patient had become increasingly tachypneic And hypoxic to the mid 80s at which time she was placed on 2 L by nasal cannula and increased to 4 L to maintain at about 88%)    Missed Time: Attempt    Comment:

## 2024-06-05 NOTE — PROGRESS NOTES
Teddy García is a 63 y.o. male admitted for Multiple pulmonary emboli (Multi). Pharmacy reviewed the patient's [id number] medications and allergies for accuracy.    The list below reflects the PTA list prior to pharmacy medication history. A summary a changes to the PTA medication list has been listed below. Please review each medication in order reconciliation for additional clarification and justification.    Source of information:  Pt medlist from [place]    Medications added:  Escitalopram 20mg every day   Olanzapine 5mg bid     Medications modified:  Hydroxyzine Paty 50mg bid prn --> 50mg bid    Medications to be removed:  Citalopram 20mg    Medications of concern:      Prior to Admission Medications   Prescriptions Last Dose Informant Patient Reported? Taking?   citalopram (CeleXA) 20 mg tablet   No No   Sig: Take 1 tablet (20 mg) by mouth once daily.   hydrOXYzine pamoate (VistariL) 50 mg capsule   No No   Sig: Take 1 capsule (50 mg) by mouth 2 times a day as needed for anxiety.   traZODone (Desyrel) 100 mg tablet   No No   Sig: Take 1 tablet (100 mg) by mouth once daily at bedtime.      Facility-Administered Medications: None       Cara Ladd

## 2024-06-05 NOTE — H&P
[facility] - GENERAL MEDICINE HISTORY AND PHYSICAL    History Obtained From: Patient and Chart Review    Patient though AO x2-3 was unable to provide any significant detail regarding his presentation other than noted below.     History Of Present Illness:  Teddy García is a 63 y.o.  male with a past medical history significant for EtOH abuse with h/o withdrawal seizures / DTs, tobacco use disorder, history of Left Toe Ulcer/Infection in 2022 and 2023, anxiety and depression. He does have a history of ambulatory dysfunction and states he uses a rollator at baseline. He states he smokes and drinks daily but unfortunately would not characterize exactly how much. He states he fell onto his left knee a few days ago and it has been hurting. Overall he feels unwell for over a week he said maybe. He states he may have fallen a week ago but he didn't really remember.  He had also noted some pain in his left lower rib particularly when he takes a deep breath then which she thought was due to him pulling a muscle versus his recurrent falls over the past several days.  Denies initiation of any new medications.  He denied any recent sick contacts, chemical/environmental exposures, changes in dietary habits or any recent traumatic events/falls other than noted above.  He denies any fevers, chills, night sweats, vision changes, auditory changes, change in taste/smell, loss of bowel/bladder control, loss consciousness, dizziness, vertigo, syncope, seizure-like activity, chest pain, palpitations, shortness of breath, coughing, wheezing, congestion, hemoptysis, hematemesis, abdominal pain, nausea, vomiting, diarrhea, constipation, dysuria, hematuria, dyschezia, hematochezia or any lateralizing motor/sensory deficits other than noted above.    Patient stated that over the past 2 days maybe longer he really has not been able to get up out of his couch other than to really go to the bathroom and at times has  had significant difficulties getting up and off of the toilet and has spent several hours laying on his couch or sitting on the toilet.  Neighbors had called EMS for a well check and they had noted that the patient was quite unkempt as well as his house and a bit of disarray as well.  He states that he has had very poor p.o. intake over the past several days as well due to significant weakness and inability to get some food and has not had anything to eat since yesterday morning.    ED Course (Summary):   Vitals on presentation: Temperature of 36.9 °C, heart rate 101, respirations 17, blood pressure 120/77, pulse ox 95% on room air  EKG noted sinus rhythm with PVCs, rate of 83, OR 67, , QTc of 610  Plt Ct = 149 (04/25/24) --> 119 -->  Has a history of thrombocytopenia averaging around the 150s - 180s at baseline (possibly in setting of alcohol use disorder)  Glucose = 139   BUN/Creatinine = 15/0.72  Sodium = 132 -->  Potassium = 3.3 -->   AST = 45  CK = 1911 -->  BNP = 27  HSTI = 15  Coagulation Panel within range  XR left knee noted no acute osseous abnormalities but there is tricompartmental osteoarthrosis severely affecting the lateral compartment with small joint effusion  CT head without contrast noted no acute intracranial processes but there is stable severe burden of chronic small vessel ischemic disease primarily in the right basal ganglia  CT chest/abdomen/pelvis with IV contrast: Full report as noted below, acute saddle pulmonary embolism with large clot burden or extending into bilateral lower lumbar pulmonary arteries and there are segmental/subsegmental segmental branches as well as into the right upper lobar pulmonary artery without evidence of right heart strain, pulmonary parenchymal infarct noted in the left lower lobe/lingula.  Patient was initiated on heparin gtt.    ED Course (From Provider):  Diagnoses as of 06/05/24 0428   Multiple pulmonary emboli (Multi)   Generalized weakness    Chronic pain of left knee     Relevant Results  Results for orders placed or performed during the hospital encounter of 06/04/24 (from the past 24 hour(s))   CBC and Auto Differential   Result Value Ref Range    WBC 6.4 4.4 - 11.3 x10*3/uL    nRBC 0.0 0.0 - 0.0 /100 WBCs    RBC 4.70 4.50 - 5.90 x10*6/uL    Hemoglobin 14.9 13.5 - 17.5 g/dL    Hematocrit 41.8 41.0 - 52.0 %    MCV 89 80 - 100 fL    MCH 31.7 26.0 - 34.0 pg    MCHC 35.6 32.0 - 36.0 g/dL    RDW 13.9 11.5 - 14.5 %    Platelets 119 (L) 150 - 450 x10*3/uL    Neutrophils % 67.2 40.0 - 80.0 %    Immature Granulocytes %, Automated 0.3 0.0 - 0.9 %    Lymphocytes % 22.3 13.0 - 44.0 %    Monocytes % 9.1 2.0 - 10.0 %    Eosinophils % 0.5 0.0 - 6.0 %    Basophils % 0.6 0.0 - 2.0 %    Neutrophils Absolute 4.27 1.20 - 7.70 x10*3/uL    Immature Granulocytes Absolute, Automated 0.02 0.00 - 0.70 x10*3/uL    Lymphocytes Absolute 1.42 1.20 - 4.80 x10*3/uL    Monocytes Absolute 0.58 0.10 - 1.00 x10*3/uL    Eosinophils Absolute 0.03 0.00 - 0.70 x10*3/uL    Basophils Absolute 0.04 0.00 - 0.10 x10*3/uL   Comprehensive metabolic panel   Result Value Ref Range    Glucose 139 (H) 74 - 99 mg/dL    Sodium 132 (L) 136 - 145 mmol/L    Potassium 3.3 (L) 3.5 - 5.3 mmol/L    Chloride 98 98 - 107 mmol/L    Bicarbonate 24 21 - 32 mmol/L    Anion Gap 13 10 - 20 mmol/L    Urea Nitrogen 15 6 - 23 mg/dL    Creatinine 0.72 0.50 - 1.30 mg/dL    eGFR >90 >60 mL/min/1.73m*2    Calcium 9.3 8.6 - 10.3 mg/dL    Albumin 4.0 3.4 - 5.0 g/dL    Alkaline Phosphatase 68 33 - 136 U/L    Total Protein 7.7 6.4 - 8.2 g/dL    AST 45 (H) 9 - 39 U/L    Bilirubin, Total 1.2 0.0 - 1.2 mg/dL    ALT 18 10 - 52 U/L   Creatine Kinase   Result Value Ref Range    Creatine Kinase 1,911 (H) 0 - 325 U/L   Troponin I, High Sensitivity   Result Value Ref Range    Troponin I, High Sensitivity 15 0 - 20 ng/L   Alcohol   Result Value Ref Range    Alcohol <10 <=10 mg/dL   Lipid Panel   Result Value Ref Range    Cholesterol 156  0 - 199 mg/dL    HDL-Cholesterol 84.3 mg/dL    Cholesterol/HDL Ratio 1.9     LDL Calculated 58 <=99 mg/dL    VLDL 13 0 - 40 mg/dL    Triglycerides 67 0 - 149 mg/dL    Non HDL Cholesterol 72 0 - 149 mg/dL   B-Type Natriuretic Peptide   Result Value Ref Range    BNP 27 0 - 99 pg/mL   aPTT - baseline   Result Value Ref Range    aPTT 29 27 - 38 seconds   Protime-INR   Result Value Ref Range    Protime 12.8 9.8 - 12.8 seconds    INR 1.1 0.9 - 1.1      CT chest abdomen pelvis w IV contrast    Result Date: 6/5/2024  Interpreted By:  Lane Ayala, STUDY: CT CHEST ABDOMEN PELVIS W IV CONTRAST;  6/5/2024 12:11 am   INDICATION: Signs/Symptoms:falls, left rib/upper abd pain.   COMPARISON: 06/27/2022   ACCESSION NUMBER(S): [id number]   ORDERING CLINICIAN: COTY OREILLY   TECHNIQUE: Contiguous axial images of the chest, abdomen, and pelvis were obtained after the intravenous administration of iodinated contrast. Coronal and sagittal reformatted images were reconstructed from the axial data.   FINDINGS: CT CHEST:   MEDIASTINUM AND LYMPH NODES:  The esophageal wall appears within normal limits.  No enlarged intrathoracic or axillary lymph nodes by imaging criteria. No pneumomediastinum.   VESSELS: There is an acute saddle pulmonary embolus that extends into the bilateral lower lobe pulmonary arteries and their segmental and subsegmental branches. There is a pulmonary embolism within the right upper lobar pulmonary artery. The main pulmonary artery is borderline dilated at 3 cm. The aorta is normal in caliber without evidence of acute traumatic injury. There is mild aortic atherosclerosis.   HEART: There is no evidence of right heart strain. The RV-LV ratio is < 1.  Moderate to severe coronary artery calcifications. No significant pericardial effusion.   LUNG, AIRWAYS, PLEURA: There is focal subpleural consolidative opacity involving the lateral segment of the left lower lobe with surrounding ground-glass density consistent with  a pulmonary infarction. There is additional area of probable infarct with mild atelectasis in the lingula. The right lung is clear. No pneumothorax. There is a left upper lobe calcified granuloma.   CHEST WALL SOFT TISSUES: No discernible acute abnormality.   OSSEOUS STRUCTURES: No acute osseous abnormality. Partially visualized internal fixation hardware in the left proximal humerus. There redemonstration of chronic nondisplaced fractures of the lateral left 8th and 9th ribs.     CT ABDOMEN/PELVIS:   ABDOMINAL WALL: No significant abnormality.   LIVER: No acute parenchymal injury or suspicious lesion. Diffuse hypoattenuation of the liver consistent with steatosis.   BILE DUCTS: No significant intrahepatic or extrahepatic dilatation.   GALLBLADDER: Cholelithiasis. No discernible gallbladder wall thickening, pericholecystic fluid, or stranding.   PANCREAS: No significant abnormality.   SPLEEN: No significant abnormality.   ADRENALS: No significant abnormality.   KIDNEYS, URETERS, BLADDER:  No significant abnormality.   REPRODUCTIVE ORGANS: No significant abnormality.   VESSELS: Moderate aortic atherosclerosis without AAA.   RETROPERITONEUM/LYMPH NODES: No enlarged lymph nodes. No acute retroperitoneal abnormality.   BOWEL/MESENTERY/PERITONEUM: There is colonic diverticulosis without evidence for diverticulitis.  No inflammatory bowel wall thickening or dilatation. Normal appendix.   No ascites, free air, or fluid collection.     MUSCULOSKELETAL: No acute osseous abnormality.       Acute saddle pulmonary embolus with large clot burden or extending into the bilateral lower lobar pulmonary arteries and their segmental and subsegmental branches, as well as into the right upper lobar pulmonary artery. No evidence of right heart strain.   Pulmonary parenchymal infarct in the left lower lobe and lingula.   No acute left rib fractures or other acute traumatic injury in the chest, abdomen, or pelvis.   Colonic diverticulosis,  hepatic steatosis, cholelithiasis.   MACRO: Lane Ayala discussed the significance and urgency of this critical finding by ERIK PABLO with  COTY OREILLY on 6/5/2024 at 12:33 am.  (**-RCF-**) Findings:  See findings.   Signed by: Lane Ayala 6/5/2024 12:46 AM Dictation workstation:   [id number]    CT head wo IV contrast    Result Date: 6/5/2024  Interpreted By:  Lane Ayala, STUDY: CT HEAD WO IV CONTRAST;  6/5/2024 12:11 am   INDICATION: Signs/Symptoms:fall.   COMPARISON: 04/17/2023   ACCESSION NUMBER(S): [id number]   ORDERING CLINICIAN: COTY OREILLY   TECHNIQUE: Noncontrast axial CT images of head were obtained with coronal and sagittal reconstructed images.   FINDINGS: BRAIN PARENCHYMA: There is extensive chronic small vessel ischemic disease including chronic lacunar infarcts in the right corona radiata and basal ganglia, subinsular white matter, right thalamus, sarah, and diffusely throughout the periventricular white matter. No acute intraparenchymal hemorrhage or parenchymal evidence of acute large territory ischemic infarct. No mass-effect.   VENTRICLES and EXTRA-AXIAL SPACES:  No acute extra-axial or intraventricular hemorrhage. No effacement of cerebral sulci. Ventricles and sulci are age-concordant.   PARANASAL SINUSES/MASTOIDS:  No hemorrhage or air-fluid levels within the visualized paranasal sinuses. The mastoids are well aerated.   CALVARIUM/ORBITS:  No skull fracture.  The orbits and globes are intact to the extent visualized.   EXTRACRANIAL SOFT TISSUES: No discernible abnormality.       1. No acute intracranial abnormality.   2. Stable, severe burden of chronic small vessel ischemic disease, primarily in the right basal ganglia.   MACRO: None.   Signed by: Lane Ayala 6/5/2024 12:30 AM Dictation workstation:   [id number]    XR knee left 1-2 views    Result Date: 6/5/2024  Interpreted By:  Lane Ayala, STUDY: XR KNEE LEFT 1-2 VIEWS; ;  6/4/2024 11:24 pm   INDICATION:  Signs/Symptoms:fall.   COMPARISON: 04/17/2023   ACCESSION NUMBER(S): [id number]   ORDERING CLINICIAN: COTY OREILLY   FINDINGS: There is redemonstration of severe joint space loss in the lateral compartment and moderate degenerative change of the medial and patellofemoral compartments. There is a small joint effusion. There is no acute fracture or malalignment.       No acute osseous abnormality of the left knee.   Tricompartmental osteoarthrosis severely affecting the lateral compartment with small joint effusion.   MACRO: None.   Signed by: Lane Ayala 6/5/2024 12:27 AM Dictation workstation:   [id number]    Scheduled medications:  folic acid, 1 mg, oral, Daily  multivitamin with minerals, 1 tablet, oral, Daily  nicotine, 1 patch, transdermal, q24h   Followed by  [START ON 7/17/2024] nicotine, 1 patch, transdermal, q24h   Followed by  [START ON 7/31/2024] nicotine, 1 patch, transdermal, q24h  [START ON 6/8/2024] thiamine, 100 mg, oral, Daily  thiamine, 100 mg, intravenous, Daily      Continuous medications:  heparin, 0-4,500 Units/hr, Last Rate: 1,800 Units/hr (06/05/24 0316)      PRN medications:  PRN medications: acetaminophen, heparin, LORazepam **OR** LORazepam **OR** LORazepam, ondansetron     Past Medical History  He has a past medical history of Alcohol abuse, in remission.    Surgical History  He has a past surgical history that includes Other surgical history (08/26/2019).     Social History  He reports that he has been smoking cigarettes. He has never used smokeless tobacco. He reports current alcohol use of about 6.0 standard drinks of alcohol per week. He reports current drug use. Drug: Marijuana.    Family History  No family history on file.   Allergies  Hydralazine    Code Status  Full Code     Review of Systems   Constitutional:  Positive for activity change and fatigue. Negative for appetite change, chills, diaphoresis, fever and unexpected weight change.   HENT:  Negative for congestion,  postnasal drip, rhinorrhea, sinus pressure, sinus pain, sneezing, sore throat, tinnitus, trouble swallowing and voice change.    Eyes:  Negative for photophobia and visual disturbance.   Respiratory:  Positive for shortness of breath. Negative for cough, chest tightness and wheezing.    Cardiovascular:  Positive for chest pain (pleuritic description). Negative for palpitations and leg swelling.   Gastrointestinal:  Negative for abdominal distention, abdominal pain, blood in stool, constipation, diarrhea, nausea and vomiting.   Endocrine: Negative for polyphagia and polyuria.   Genitourinary:  Negative for decreased urine volume, difficulty urinating, dysuria, flank pain, frequency, hematuria and urgency.   Musculoskeletal:  Positive for arthralgias, back pain, gait problem and myalgias. Negative for joint swelling, neck pain and neck stiffness.   Skin:  Negative for wound.   Neurological:  Positive for weakness. Negative for dizziness, tremors, seizures, syncope, facial asymmetry, speech difficulty, light-headedness, numbness and headaches.   Psychiatric/Behavioral:  Negative for confusion and decreased concentration. The patient is not nervous/anxious.    All other systems reviewed and are negative.      Last Recorded Vitals  /74 (Patient Position: Lying)   Pulse 70   Temp 36.9 °C (98.4 °F)   Resp (!) 22   Wt 97.5 kg (215 lb)   SpO2 (!) 93%      Physical Exam  Vitals and nursing note reviewed.   Constitutional:       General: He is not in acute distress.     Appearance: Normal appearance. He is not ill-appearing or diaphoretic.      Comments: Asleep on arrival, did take several attempts to awaken, slow to respond but interact to the best of his ability AO x2-3. He know that he is in a hospital but not sure where. Knew who the president was   HENT:      Head: Normocephalic and atraumatic.      Mouth/Throat:      Mouth: Mucous membranes are moist.      Pharynx: Oropharynx is clear.   Eyes:      Extraocular  Movements: Extraocular movements intact.      Conjunctiva/sclera: Conjunctivae normal.      Pupils: Pupils are equal, round, and reactive to light.   Neck:      Vascular: No carotid bruit.   Cardiovascular:      Rate and Rhythm: Normal rate and regular rhythm.      Pulses: Normal pulses.      Heart sounds: No murmur heard.  Pulmonary:      Effort: Pulmonary effort is normal. No respiratory distress.      Breath sounds: No rales.      Comments: Overall diminished with faint crackles in the mid lung fields bilaterally and left lower lung fields, saturating at 97% on room air as the nasal cannula was off his face   Chest:      Chest wall: Tenderness (some tenderness to palpation of the left lower lateral chest wall) present.   Abdominal:      General: Abdomen is flat. There is no distension.      Palpations: There is no mass.      Tenderness: There is no abdominal tenderness. There is no right CVA tenderness, left CVA tenderness, guarding or rebound.   Musculoskeletal:      Cervical back: Normal range of motion and neck supple. No rigidity or tenderness.      Comments: ROM/Strength globally diminished but symmetrict at about 4/5 in the upper extremities and 3-4/5 in the lower. He was able to hold against gravity for slight above 6second at which time his legs fell onto the bed. Somewhat tremulous with ROM test   Skin:     General: Skin is warm and dry.      Capillary Refill: Capillary refill takes less than 2 seconds.      Findings: No bruising, erythema, lesion or rash.   Neurological:      General: No focal deficit present.      Mental Status: He is alert.      Cranial Nerves: No cranial nerve deficit.      Sensory: No sensory deficit.      Motor: Weakness present.      Gait: Gait abnormal.      Comments: AO x2-3, cranial nerves appear grossly intact but patient is very slow to respond, there is no noted facial asymmetry, fluent speech, unable to complete heel-to-shin due to profound lower extremity weakness, he did  attempt finger-nose and barely missed it with both upper extremities as he was tremoring throughout the whole process.  Weakness as noted above.  Gait was not assessed due to significant weakness and out of safety to help prevent falls.  He was barely able to sit up in bed with assistance   Psychiatric:      Comments: Somewhat withdrawn but appropriately interactive to the best of his abilities.       Assessment/Plan   Principal Problem:    Multiple pulmonary emboli (Multi)    Extensive Bilateral Saddle Pulmonary Emboli   Pulmonary Parenchymal Infarct (left lower lobe/lingula)  -CTA Chest as noted above without signs of right heart strain  -Heparin gtt   -TTE pending  -will need to transition to oral agents accordingly  -no signs of right heart strain and remains hemodynamically stable currently  -will need to discuss further with PERT team if thrombectomy is warranted give the extensive clot burden  -unclear inciting event but suspect repeat falls and limited mobility   -Lower Extremity Venous Duplex = pending  -Lidocaine Patch and analgesics  -Incentive Spirometer   -Telemetry Monitoring     Alcohol Use Disorder with Concerns for Impending Alcohol Withdrawal Syndrome  History of Alcohol Withdrawal Syndrome/Delirium Tremens with withdrawal seizures  -Alcohol Level <10  -UDS pending  -CIWA protocol with PO Ativan  -MVI/Folate/Thiamine     Thrombocytopenia  -Plt Ct = 149 (04/25/24) --> 119 -->  -known history of thrombocytopenia averaging around the 150s - 180s at baseline (possibly in setting of alcohol use disorder)  -no over signs of bleeding or abnormal bleeding currently   -monitor closely especially in setting of need for heparin and blood thinners in general for extensive pulmonary emboli    Rhabdomyolysis?  -elevated CK possibly in setting of above vs being of limited mobility as of late?  -suspect also why he has become even weaker and with limited ROM/Strength particularly in the lower extremities    -Urinalysis = pending  -CK = 1911 -->1247 -->  -will start of IVFs but monitor fluid status closely in setting of extensive pulmonary emboli regardless of if heart strain is not present at this time    Hypomagnesemia  Hypokalemia  Hyponatremia  -likely poor intake  -replace accordingly  -Sodium = 132 --> 134 -->   -Potassium = 3.3 --> 2.9 -->   -Magnesium = 1.47 -->    Acute on Chronic Deconditioning   Ambulatory Dysfunction  Inability to Care for Self  -PT/OT appreciated  -SW/CM appreciated      DO Jo Ann Gorman dictation software was used to dictate this note and thus there may be minor errors in translation/transcription including garbled speech or misspellings. Please contact for clarification if needed.

## 2024-06-06 ENCOUNTER — APPOINTMENT (OUTPATIENT)
Dept: CARDIOLOGY | Facility: HOSPITAL | Age: 64
End: 2024-06-06
Payer: MEDICAID

## 2024-06-06 LAB
ANION GAP SERPL CALC-SCNC: 11 MMOL/L (ref 10–20)
BUN SERPL-MCNC: 9 MG/DL (ref 6–23)
CALCIUM SERPL-MCNC: 8.3 MG/DL (ref 8.6–10.3)
CHLORIDE SERPL-SCNC: 101 MMOL/L (ref 98–107)
CO2 SERPL-SCNC: 27 MMOL/L (ref 21–32)
CREAT SERPL-MCNC: 0.52 MG/DL (ref 0.5–1.3)
EGFRCR SERPLBLD CKD-EPI 2021: >90 ML/MIN/1.73M*2
ERYTHROCYTE [DISTWIDTH] IN BLOOD BY AUTOMATED COUNT: 14 % (ref 11.5–14.5)
GLUCOSE SERPL-MCNC: 157 MG/DL (ref 74–99)
HCT VFR BLD AUTO: 39.4 % (ref 41–52)
HGB BLD-MCNC: 13.9 G/DL (ref 13.5–17.5)
MAGNESIUM SERPL-MCNC: 1.31 MG/DL (ref 1.6–2.4)
MCH RBC QN AUTO: 32.1 PG (ref 26–34)
MCHC RBC AUTO-ENTMCNC: 35.3 G/DL (ref 32–36)
MCV RBC AUTO: 91 FL (ref 80–100)
NRBC BLD-RTO: 0 /100 WBCS (ref 0–0)
PLATELET # BLD AUTO: 120 X10*3/UL (ref 150–450)
POTASSIUM SERPL-SCNC: 3.3 MMOL/L (ref 3.5–5.3)
RBC # BLD AUTO: 4.33 X10*6/UL (ref 4.5–5.9)
SODIUM SERPL-SCNC: 136 MMOL/L (ref 136–145)
WBC # BLD AUTO: 5.3 X10*3/UL (ref 4.4–11.3)

## 2024-06-06 PROCEDURE — 2060000001 HC INTERMEDIATE ICU ROOM DAILY

## 2024-06-06 PROCEDURE — 93010 ELECTROCARDIOGRAM REPORT: CPT | Performed by: INTERNAL MEDICINE

## 2024-06-06 PROCEDURE — 93005 ELECTROCARDIOGRAM TRACING: CPT

## 2024-06-06 PROCEDURE — 36415 COLL VENOUS BLD VENIPUNCTURE: CPT

## 2024-06-06 PROCEDURE — 99233 SBSQ HOSP IP/OBS HIGH 50: CPT | Performed by: INTERNAL MEDICINE

## 2024-06-06 PROCEDURE — 85027 COMPLETE CBC AUTOMATED: CPT

## 2024-06-06 PROCEDURE — 2500000002 HC RX 250 W HCPCS SELF ADMINISTERED DRUGS (ALT 637 FOR MEDICARE OP, ALT 636 FOR OP/ED): Performed by: INTERNAL MEDICINE

## 2024-06-06 PROCEDURE — 2500000001 HC RX 250 WO HCPCS SELF ADMINISTERED DRUGS (ALT 637 FOR MEDICARE OP): Performed by: INTERNAL MEDICINE

## 2024-06-06 PROCEDURE — 97530 THERAPEUTIC ACTIVITIES: CPT | Mod: GO,CO

## 2024-06-06 PROCEDURE — 97530 THERAPEUTIC ACTIVITIES: CPT | Mod: GP,CQ | Performed by: PHYSICAL THERAPY ASSISTANT

## 2024-06-06 PROCEDURE — 2500000004 HC RX 250 GENERAL PHARMACY W/ HCPCS (ALT 636 FOR OP/ED): Performed by: INTERNAL MEDICINE

## 2024-06-06 PROCEDURE — 2500000005 HC RX 250 GENERAL PHARMACY W/O HCPCS: Performed by: INTERNAL MEDICINE

## 2024-06-06 PROCEDURE — 97112 NEUROMUSCULAR REEDUCATION: CPT | Mod: GP,CQ | Performed by: PHYSICAL THERAPY ASSISTANT

## 2024-06-06 PROCEDURE — 80048 BASIC METABOLIC PNL TOTAL CA: CPT

## 2024-06-06 PROCEDURE — 83735 ASSAY OF MAGNESIUM: CPT

## 2024-06-06 PROCEDURE — 94640 AIRWAY INHALATION TREATMENT: CPT

## 2024-06-06 RX ORDER — OXYCODONE HYDROCHLORIDE 10 MG/1
10 TABLET ORAL EVERY 8 HOURS PRN
Status: DISCONTINUED | OUTPATIENT
Start: 2024-06-06 | End: 2024-06-10 | Stop reason: HOSPADM

## 2024-06-06 RX ORDER — IPRATROPIUM BROMIDE AND ALBUTEROL SULFATE 2.5; .5 MG/3ML; MG/3ML
3 SOLUTION RESPIRATORY (INHALATION) 3 TIMES DAILY
Status: DISCONTINUED | OUTPATIENT
Start: 2024-06-06 | End: 2024-06-06

## 2024-06-06 RX ORDER — CYCLOBENZAPRINE HCL 10 MG
5 TABLET ORAL 3 TIMES DAILY PRN
Status: DISCONTINUED | OUTPATIENT
Start: 2024-06-06 | End: 2024-06-10 | Stop reason: HOSPADM

## 2024-06-06 RX ORDER — MAGNESIUM SULFATE HEPTAHYDRATE 40 MG/ML
4 INJECTION, SOLUTION INTRAVENOUS ONCE
Status: COMPLETED | OUTPATIENT
Start: 2024-06-06 | End: 2024-06-06

## 2024-06-06 RX ORDER — IPRATROPIUM BROMIDE AND ALBUTEROL SULFATE 2.5; .5 MG/3ML; MG/3ML
3 SOLUTION RESPIRATORY (INHALATION) EVERY 2 HOUR PRN
Status: DISCONTINUED | OUTPATIENT
Start: 2024-06-06 | End: 2024-06-10 | Stop reason: HOSPADM

## 2024-06-06 RX ORDER — OXYCODONE HYDROCHLORIDE 5 MG/1
5 TABLET ORAL EVERY 8 HOURS PRN
Status: DISCONTINUED | OUTPATIENT
Start: 2024-06-06 | End: 2024-06-10 | Stop reason: HOSPADM

## 2024-06-06 RX ADMIN — LIDOCAINE 4% 1 PATCH: 40 PATCH TOPICAL at 04:53

## 2024-06-06 RX ADMIN — APIXABAN 10 MG: 5 TABLET, FILM COATED ORAL at 08:47

## 2024-06-06 RX ADMIN — THIAMINE HYDROCHLORIDE 100 MG: 100 INJECTION, SOLUTION INTRAMUSCULAR; INTRAVENOUS at 08:46

## 2024-06-06 RX ADMIN — ACETAMINOPHEN 650 MG: 325 TABLET ORAL at 21:04

## 2024-06-06 RX ADMIN — IPRATROPIUM BROMIDE AND ALBUTEROL SULFATE 3 ML: 2.5; .5 SOLUTION RESPIRATORY (INHALATION) at 16:24

## 2024-06-06 RX ADMIN — FOLIC ACID 1 MG: 1 TABLET ORAL at 08:47

## 2024-06-06 RX ADMIN — Medication 3 L/MIN: at 20:00

## 2024-06-06 RX ADMIN — Medication 1 TABLET: at 08:47

## 2024-06-06 RX ADMIN — MAGNESIUM SULFATE HEPTAHYDRATE 4 G: 40 INJECTION, SOLUTION INTRAVENOUS at 14:08

## 2024-06-06 RX ADMIN — APIXABAN 10 MG: 5 TABLET, FILM COATED ORAL at 21:04

## 2024-06-06 RX ADMIN — ACETAMINOPHEN 650 MG: 325 TABLET ORAL at 14:03

## 2024-06-06 ASSESSMENT — LIFESTYLE VARIABLES
ANXIETY: NO ANXIETY, AT EASE
AGITATION: NORMAL ACTIVITY
PAROXYSMAL SWEATS: NO SWEAT VISIBLE
TOTAL SCORE: 4
PAROXYSMAL SWEATS: NO SWEAT VISIBLE
VISUAL DISTURBANCES: NOT PRESENT
ANXIETY: MILDLY ANXIOUS
NAUSEA AND VOMITING: NO NAUSEA AND NO VOMITING
TACTILE DISTURBANCES: VERY MILD ITCHING, PINS AND NEEDLES, BURNING OR NUMBNESS
HEADACHE, FULLNESS IN HEAD: VERY MILD
PAROXYSMAL SWEATS: NO SWEAT VISIBLE
TREMOR: 2
NAUSEA AND VOMITING: NO NAUSEA AND NO VOMITING
AGITATION: NORMAL ACTIVITY
HEADACHE, FULLNESS IN HEAD: NOT PRESENT
VISUAL DISTURBANCES: NOT PRESENT
ANXIETY: NO ANXIETY, AT EASE
ORIENTATION AND CLOUDING OF SENSORIUM: ORIENTED AND CAN DO SERIAL ADDITIONS
TOTAL SCORE: 2
NAUSEA AND VOMITING: NO NAUSEA AND NO VOMITING
AUDITORY DISTURBANCES: NOT PRESENT
ORIENTATION AND CLOUDING OF SENSORIUM: ORIENTED AND CAN DO SERIAL ADDITIONS
TOTAL SCORE: 0
VISUAL DISTURBANCES: VERY MILD SENSITIVITY
ORIENTATION AND CLOUDING OF SENSORIUM: ORIENTED AND CAN DO SERIAL ADDITIONS
HEADACHE, FULLNESS IN HEAD: NOT PRESENT
AGITATION: NORMAL ACTIVITY
TREMOR: NO TREMOR
TREMOR: NO TREMOR
AUDITORY DISTURBANCES: NOT PRESENT
AUDITORY DISTURBANCES: NOT PRESENT

## 2024-06-06 ASSESSMENT — COGNITIVE AND FUNCTIONAL STATUS - GENERAL
MOVING FROM LYING ON BACK TO SITTING ON SIDE OF FLAT BED WITH BEDRAILS: A LOT
DAILY ACTIVITIY SCORE: 9
MOVING TO AND FROM BED TO CHAIR: A LOT
DRESSING REGULAR UPPER BODY CLOTHING: A LOT
DRESSING REGULAR UPPER BODY CLOTHING: TOTAL
WALKING IN HOSPITAL ROOM: A LOT
PERSONAL GROOMING: A LITTLE
MOVING FROM LYING ON BACK TO SITTING ON SIDE OF FLAT BED WITH BEDRAILS: A LITTLE
CLIMB 3 TO 5 STEPS WITH RAILING: TOTAL
HELP NEEDED FOR BATHING: TOTAL
TOILETING: TOTAL
WALKING IN HOSPITAL ROOM: TOTAL
PERSONAL GROOMING: A LOT
CLIMB 3 TO 5 STEPS WITH RAILING: TOTAL
DAILY ACTIVITIY SCORE: 14
DRESSING REGULAR LOWER BODY CLOTHING: TOTAL
EATING MEALS: A LITTLE
TOILETING: A LOT
DRESSING REGULAR LOWER BODY CLOTHING: A LOT
MOBILITY SCORE: 9
MOVING TO AND FROM BED TO CHAIR: TOTAL
STANDING UP FROM CHAIR USING ARMS: A LOT
MOBILITY SCORE: 12
HELP NEEDED FOR BATHING: A LOT
STANDING UP FROM CHAIR USING ARMS: A LOT
TURNING FROM BACK TO SIDE WHILE IN FLAT BAD: A LOT
EATING MEALS: A LITTLE
TURNING FROM BACK TO SIDE WHILE IN FLAT BAD: A LOT

## 2024-06-06 ASSESSMENT — PAIN SCALES - GENERAL
PAINLEVEL_OUTOF10: 10 - WORST POSSIBLE PAIN
PAINLEVEL_OUTOF10: 3
PAINLEVEL_OUTOF10: 5 - MODERATE PAIN
PAINLEVEL_OUTOF10: 5 - MODERATE PAIN
PAINLEVEL_OUTOF10: 0 - NO PAIN

## 2024-06-06 ASSESSMENT — PAIN DESCRIPTION - LOCATION
LOCATION: GENERALIZED
LOCATION: KNEE

## 2024-06-06 ASSESSMENT — PAIN - FUNCTIONAL ASSESSMENT
PAIN_FUNCTIONAL_ASSESSMENT: 0-10

## 2024-06-06 ASSESSMENT — PAIN DESCRIPTION - ORIENTATION: ORIENTATION: LEFT

## 2024-06-06 NOTE — CARE PLAN
Problem: Skin  Goal: Participates in plan/prevention/treatment measures  Outcome: Progressing  Goal: Prevent/manage excess moisture  Outcome: Progressing  Goal: Prevent/minimize sheer/friction injuries  Outcome: Progressing  Goal: Promote/optimize nutrition  Outcome: Progressing     Problem: Fall/Injury  Goal: Be free from injury by end of the shift  Outcome: Progressing     Problem: Deep Vein Thrombosis  Goal: I will remain free from complications of deep vein thrombosis and maintain current level of mobility  Outcome: Progressing     Problem: Pain  Goal: Takes deep breaths with improved pain control throughout the shift  Outcome: Progressing  Goal: Turns in bed with improved pain control throughout the shift  Outcome: Progressing     The clinical goals for the shift include Patient to remain free from falls

## 2024-06-06 NOTE — PROGRESS NOTES
Physical Therapy    Physical Therapy Treatment    Patient Name: Teddy García  MRN: [id number]  Today's Date: 6/6/2024  Time Calculation  Start Time: 1240  Stop Time: 1303  Time Calculation (min): 23 min    Assessment/Plan   PT Assessment  End of Session Communication: Bedside nurse  Assessment Comment: pt demonstrated good progress this session. HE is compliant with treatment and would benefit from further skilled PT. pt demonstrated improved mobility as session progressed.  End of Session Patient Position: Alarm on, Up in chair     PT Plan  Treatment/Interventions: Bed mobility, Transfer training, Gait training, Neuromuscular re-education, Therapeutic exercise, Therapeutic activity  PT Plan: Skilled PT  PT Frequency: 4 times per week  PT Discharge Recommendations: Moderate intensity level of continued care, 24 hr supervision due to cognition  PT Recommended Transfer Status: Assist x2  PT - OK to Discharge: Yes (to next level of care when cleared by medical team)      General Visit Information:   PT  Visit  PT Received On: 06/06/24  Response to Previous Treatment: Patient with no complaints from previous session.  General  Reason for Referral: Impaired ADLs and Functional mobility. 64 y/o male  presented to ED  w/ weakness and falls, states he could not stand up from couch--stuck there a few days.  Referred By: GEOVANI Wang  Past Medical History Relevant to Rehab: PMH: EtOH abuse with h/o withdrawal seizures / DTs, tobacco use disorder, history of Left Toe Ulcer/Infection in 2022 and 2023, anxiety and depression  Missed Visit: Yes  Missed Visit Reason:  (transport arrived to take pt to step down. PTA to attempt at later time.)  Prior to Session Communication: Bedside nurse  Patient Position Received: Bed, 3 rail up, Alarm on  Preferred Learning Style: verbal  General Comment: pt agreeable to treatment and cleared by RN.      Precautions:  Precautions  Medical Precautions: Fall precautions, Oxygen therapy device and  L/min  Precautions Comment: CIWA protocol. RLE DVT's, multiple PE on Eliquis         Pain:  Pain Assessment  Pain Assessment: 0-10  Pain Score:  (c/o generalized pain but does not rate)  Cognition:  Cognition  Overall Cognitive Status: Within Functional Limits            Treatments:  Bed Mobility  Bed Mobility: Yes  Bed Mobility 1  Bed Mobility 1: Supine to sitting  Level of Assistance 1: Maximum verbal cues, Maximum assistance  Bed Mobility Comments 1: pt required cues for safety and improved technique.    Ambulation/Gait Training  Ambulation/Gait Training Performed: Yes  Ambulation/Gait Training 1  Surface 1: Level tile  Device 1: Rolling walker  Assistance 1: Moderate assistance, Moderate verbal cues (Ax2)  Quality of Gait 1: Shuffling gait, Antalgic  Comments/Distance (ft) 1: 4 feet to chair with cues for safety and technique.  Transfers  Transfer: Yes  Transfer 1  Technique 1: Sit to stand, Stand to sit  Transfer Device 1: Walker  Transfer Level of Assistance 1: Moderate assistance, +2, Moderate verbal cues  Trials/Comments 1: pt required MAX cues for proper hand placement and safety. (pt perforce multiple sit<>stands.)    Outcome Measures:  [facility] Basic Mobility  Turning from your back to your side while in a flat bed without using bedrails: A little  Moving from lying on your back to sitting on the side of a flat bed without using bedrails: A lot  Moving to and from bed to chair (including a wheelchair): A lot  Standing up from a chair using your arms (e.g. wheelchair or bedside chair): A lot  To walk in hospital room: A lot  Climbing 3-5 steps with railing: Total  Basic Mobility - Total Score: 12    Education Documentation  Mobility Training, taught by Myrtle Ferreira PTA at 6/6/2024  1:15 PM.  Learner: Patient  Readiness: Acceptance  Method: Explanation  Response: Verbalizes Understanding, Needs Reinforcement    Education Comments  No comments found.               Encounter Problems       Encounter Problems  (Active)       Mobility       STG - Patient will ambulate 5-10 ft with FWW (uses ROLLATOR at home) to access chair/BSC to reduce bedrest (Progressing)       Start:  06/05/24    Expected End:  06/19/24               PT Transfers       STG - Patient will transfer from one surface to another with reduced caregiver nassist (Progressing)       Start:  06/05/24    Expected End:  06/19/24               Pain - Adult          strength       STG-pt will perform 10-20 reps AROM BLE (caution RLE DVT's) (Progressing)       Start:  06/05/24    Expected End:  06/19/24

## 2024-06-06 NOTE — PROGRESS NOTES
PERT-progress note     Subjective Data:  Patient reports no shortness of breath, chest pain or issues overnight. He is seen lying comfortably in bed on n/c supplemental oxygen 2 L. He says that he did attempt to stand yesterday but was unsuccessful. He has weakness to both his lower legs. He denied any shortness of breath with that activity. He transferred from ICU to SDU today. H/H remains stable and patient denies any evidence of bleeding. Patient given PE/DVT education at the bedside with education folder.     Overnight Events:    None      Objective Data:  Last Recorded Vitals:  Vitals:    06/06/24 0731 06/06/24 0800 06/06/24 1000 06/06/24 1042   BP:  134/76 133/76 128/72   BP Location:    Left arm   Patient Position:    Lying   Pulse:  67 75 77   Resp:  (!) 27 (!) 28 20   Temp: 36.8 °C (98.2 °F)   37 °C (98.6 °F)   TempSrc: Temporal   Temporal   SpO2:  95% 94% 97%   Weight:       Height:           Last Labs:  CBC - 6/6/2024:  4:52 AM  5.3 13.9 120    39.4      CMP - 6/6/2024:  4:52 AM  8.3 7.7 45 --- 1.2   2.8 3.4 18 68      PTT - 6/5/2024: 12:49 AM  1.1   12.8 29     TROPHS   Date/Time Value Ref Range Status   06/04/2024 10:24 PM 15 0 - 20 ng/L Final   01/13/2024 10:38 PM 5 0 - 20 ng/L Final   01/13/2024 09:09 PM 4 0 - 20 ng/L Final     BNP   Date/Time Value Ref Range Status   06/05/2024 12:49 AM 27 0 - 99 pg/mL Final   01/13/2024 09:09 PM 43 0 - 99 pg/mL Final     HGBA1C   Date/Time Value Ref Range Status   06/05/2024 04:27 AM 5.8 see below % Final     LDLCALC   Date/Time Value Ref Range Status   06/04/2024 10:24 PM 58 <=99 mg/dL Final     Comment:                                 Near   Borderline      AGE      Desirable  Optimal    High     High     Very High     0-19 Y     0 - 109     ---    110-129   >/= 130     ----    20-24 Y     0 - 119     ---    120-159   >/= 160     ----      >24 Y     0 -  99   100-129  130-159   160-189     >/=190       VLDL   Date/Time Value Ref Range Status   06/04/2024 10:24 PM  13 0 - 40 mg/dL Final      Last I/O:  I/O last 3 completed shifts:  In: 3030 (32.8 mL/kg) [P.O.:720; I.V.:1310 (14.2 mL/kg); IV Piggyback:1000]  Out: 1010 (10.9 mL/kg) [Urine:1010 (0.3 mL/kg/hr)]  Weight: 92.5 kg     Past Cardiology Tests (Last 3 Years):  EKG:  ECG 12 lead 06/04/2024 (Preliminary)      ECG 12 lead 04/25/2024      ECG 12 lead 02/06/2024    Echo:  Transthoracic Echo (TTE) Complete 06/05/2024    Transthoracic Echo (TTE) Complete    Result Date: 6/5/2024              [place] [address]      Phone [phone] Fax [phone] TRANSTHORACIC ECHOCARDIOGRAM REPORT  Patient Name:      LILIANE ARGUELLES        Reading Physician:    [id number] Onur Cee MD Study Date:        6/5/2024              Ordering Provider:    [id number] CAROLYN CAPPS MRN/PID:           [id number]              Fellow: Accession#:        [id number]          Nurse:                Selina - PATY RN Date of Birth/Age: 1960 / 63 years Sonographer:          Myrtle Shelley RDCS Gender:            M                     Additional Staff: Height:            193.04 cm             Admit Date:           6/4/2024 Weight:            97.52 kg              Admission Status:     Inpatient - STAT BSA / BMI:         2.28 m2 / 26.17 kg/m2 Department Location:  [facility] Blood Pressure: 118 /76 mmHg Study Type:    TRANSTHORACIC ECHO (TTE) COMPLETE Diagnosis/ICD: Other pulmonary embolism without acute cor pulmonale-I26.99 Indication:    Dyspnea on Exertion CPT Codes:     Echo Complete w Full Doppler-[id number] Patient History: Pertinent History: PE. Study Detail: The following Echo studies were performed: 2D, M-Mode, Doppler and               color flow. Technically challenging study due to body habitus and               poor acoustic windows. Optison used as a contrast agent for                endocardial border definition. Total contrast used for this               procedure was 3 mL via IV push.  PHYSICIAN INTERPRETATION: Left Ventricle: Left ventricular systolic function is normal, with an estimated ejection fraction of 55%. There are no regional wall motion abnormalities. The left ventricular cavity size is normal. Spectral Doppler shows a normal pattern of left ventricular diastolic filling. Left Atrium: The left atrium is normal in size. Right Ventricle: The right ventricle is normal in size. There is normal right ventricular global systolic function. Right Atrium: The right atrium is normal in size. Aortic Valve: The aortic valve is trileaflet. There is no evidence of aortic valve regurgitation. The peak instantaneous gradient of the aortic valve is 5.5 mmHg. The mean gradient of the aortic valve is 3.0 mmHg. Mitral Valve: The mitral valve is normal in structure. There is no evidence of mitral valve regurgitation. Tricuspid Valve: The tricuspid valve is structurally normal. There is trace tricuspid regurgitation. Pulmonic Valve: The pulmonic valve is structurally normal. There is no indication of pulmonic valve regurgitation. Pericardium: There is no pericardial effusion noted. There is a pericardial fat pad present. Aorta: The aortic root is abnormal. There is mild dilatation of the ascending aorta. There is mild dilatation of the aortic root.  CONCLUSIONS:  1. Left ventricular systolic function is normal with a 55% estimated ejection fraction. QUANTITATIVE DATA SUMMARY: 2D MEASUREMENTS:                          Normal Ranges: Ao Root d:     4.00 cm   (2.0-3.7cm) LAs:           2.40 cm   (2.7-4.0cm) IVSd:          1.10 cm   (0.6-1.1cm) LVPWd:         1.00 cm   (0.6-1.1cm) LVIDd:         4.70 cm   (3.9-5.9cm) LVIDs:         3.20 cm LV Mass Index: 77.0 g/m2 LV % FS        31.9 % LA VOLUME:                               Normal Ranges: LA Vol A4C:        27.5 ml    (22+/-6mL/m2) LA Vol A2C:         31.3 ml LA Vol BP:         29.9 ml LA Vol Index A4C:  12.1ml/m2 LA Vol Index A2C:  13.7 ml/m2 LA Vol Index BP:   13.1 ml/m2 LA Area A4C:       12.6 cm2 LA Area A2C:       13.7 cm2 LA Major Axis A4C: 4.9 cm LA Major Axis A2C: 5.1 cm LA Volume Index:   13.1 ml/m2 RA VOLUME BY A/L METHOD:                       Normal Ranges: RA Area A4C: 17.0 cm2 AORTA MEASUREMENTS:                      Normal Ranges: Ao Sinus, d: 4.00 cm (2.1-3.5cm) Ao STJ, d:   3.60 cm (1.7-3.4cm) Asc Ao, d:   4.00 cm (2.1-3.4cm) LV SYSTOLIC FUNCTION BY 2D PLANIMETRY (MOD):                     Normal Ranges: EF-A4C View: 56.0 % (>=55%) EF-A2C View: 66.3 % EF-Biplane:  62.5 % LV DIASTOLIC FUNCTION:                        Normal Ranges: MV Peak E:    0.46 m/s (0.7-1.2 m/s) MV Peak A:    0.60 m/s (0.42-0.7 m/s) E/A Ratio:    0.76     (1.0-2.2) MV e'         0.07 m/s (>8.0) MV lateral e' 0.08 m/s MV medial e'  0.06 m/s E/e' Ratio:   6.56     (<8.0) AORTIC VALVE:                                   Normal Ranges: AoV Vmax:                1.17 m/s (<=1.7m/s) AoV Peak P.5 mmHg (<20mmHg) AoV Mean PG:             3.0 mmHg (1.7-11.5mmHg) LVOT Max Som:            0.76 m/s (<=1.1m/s) AoV VTI:                 23.00 cm (18-25cm) LVOT VTI:                17.50 cm LVOT Diameter:           2.50 cm  (1.8-2.4cm) AoV Area, VTI:           3.73 cm2 (2.5-5.5cm2) AoV Area,Vmax:           3.18 cm2 (2.5-4.5cm2) AoV Dimensionless Index: 0.76  RIGHT VENTRICLE: RV Basal 3.60 cm RV Mid   2.50 cm RV Major 7.2 cm TAPSE:   25.9 mm RV s'    0.15 m/s TRICUSPID VALVE/RVSP:                             Normal Ranges: Peak TR Velocity: 1.86 m/s RV Syst Pressure: 16.8 mmHg (< 30mmHg) IVC Diam:         1.20 cm  [id number] Onur Cee MD Electronically signed on 2024 at 4:23:06 PM  ** Final **     ECG 12 lead    Result Date: 2024  Sinus rhythm Atrial premature complex Short MA interval Consider right atrial enlargement Inferior infarct, old Prolonged QT  interval    Lower extremity venous duplex bilateral    Result Date: 6/5/2024  Interpreted By:  Franki Cardozo, STUDY: [facility] LOWER EXTREMITY VENOUS DUPLEX BILATERAL; 6/5/2024 7:34 am   INDICATION: PE, DVT?   COMPARISON: None   ACCESSION NUMBER(S): [id number]   ORDERING CLINICIAN: GELA CRAIG   TECHNIQUE: Static grayscale, color Doppler, and spectral Doppler sonographic images of the bilateral lower extremities were obtained for duplex evaluation.   FINDINGS: LEFT LOWER EXTREMITY: There is no evidence of deep venous thrombus in the visualized portions of the common femoral vein, femoral vein, or popliteal vein. Respiratory variation and augmentation to calf pressure is noted. The visualized portion of the posterior tibial and peroneal veins are unremarkable.   RIGHT LOWER EXTREMITY: Occlusive thrombus extending from the common femoral vein through the popliteal vein. Suboptimal calf vein evaluation.       1. Occlusive thrombus from the femoral vein through the popliteal vein on the right.   Signed by: Franki Cardozo 6/5/2024 7:39 AM Dictation workstation:   [id number]    CT chest abdomen pelvis w IV contrast    Result Date: 6/5/2024  Interpreted By:  Lane Ayala, STUDY: CT CHEST ABDOMEN PELVIS W IV CONTRAST;  6/5/2024 12:11 am   INDICATION: Signs/Symptoms:falls, left rib/upper abd pain.   COMPARISON: 06/27/2022   ACCESSION NUMBER(S): [id number]   ORDERING CLINICIAN: COTY OREILLY   TECHNIQUE: Contiguous axial images of the chest, abdomen, and pelvis were obtained after the intravenous administration of iodinated contrast. Coronal and sagittal reformatted images were reconstructed from the axial data.   FINDINGS: CT CHEST:   MEDIASTINUM AND LYMPH NODES:  The esophageal wall appears within normal limits.  No enlarged intrathoracic or axillary lymph nodes by imaging criteria. No pneumomediastinum.   VESSELS: There is an acute saddle pulmonary embolus that extends into the bilateral lower lobe pulmonary arteries and  their segmental and subsegmental branches. There is a pulmonary embolism within the right upper lobar pulmonary artery. The main pulmonary artery is borderline dilated at 3 cm. The aorta is normal in caliber without evidence of acute traumatic injury. There is mild aortic atherosclerosis.   HEART: There is no evidence of right heart strain. The RV-LV ratio is < 1.  Moderate to severe coronary artery calcifications. No significant pericardial effusion.   LUNG, AIRWAYS, PLEURA: There is focal subpleural consolidative opacity involving the lateral segment of the left lower lobe with surrounding ground-glass density consistent with a pulmonary infarction. There is additional area of probable infarct with mild atelectasis in the lingula. The right lung is clear. No pneumothorax. There is a left upper lobe calcified granuloma.   CHEST WALL SOFT TISSUES: No discernible acute abnormality.   OSSEOUS STRUCTURES: No acute osseous abnormality. Partially visualized internal fixation hardware in the left proximal humerus. There redemonstration of chronic nondisplaced fractures of the lateral left 8th and 9th ribs.     CT ABDOMEN/PELVIS:   ABDOMINAL WALL: No significant abnormality.   LIVER: No acute parenchymal injury or suspicious lesion. Diffuse hypoattenuation of the liver consistent with steatosis.   BILE DUCTS: No significant intrahepatic or extrahepatic dilatation.   GALLBLADDER: Cholelithiasis. No discernible gallbladder wall thickening, pericholecystic fluid, or stranding.   PANCREAS: No significant abnormality.   SPLEEN: No significant abnormality.   ADRENALS: No significant abnormality.   KIDNEYS, URETERS, BLADDER:  No significant abnormality.   REPRODUCTIVE ORGANS: No significant abnormality.   VESSELS: Moderate aortic atherosclerosis without AAA.   RETROPERITONEUM/LYMPH NODES: No enlarged lymph nodes. No acute retroperitoneal abnormality.   BOWEL/MESENTERY/PERITONEUM: There is colonic diverticulosis without evidence  for diverticulitis.  No inflammatory bowel wall thickening or dilatation. Normal appendix.   No ascites, free air, or fluid collection.     MUSCULOSKELETAL: No acute osseous abnormality.       Acute saddle pulmonary embolus with large clot burden or extending into the bilateral lower lobar pulmonary arteries and their segmental and subsegmental branches, as well as into the right upper lobar pulmonary artery. No evidence of right heart strain.   Pulmonary parenchymal infarct in the left lower lobe and lingula.   No acute left rib fractures or other acute traumatic injury in the chest, abdomen, or pelvis.   Colonic diverticulosis, hepatic steatosis, cholelithiasis.   MACRO: Lane Ayala discussed the significance and urgency of this critical finding by ERIK PABLO with  COTY OREILLY on 6/5/2024 at 12:33 am.  (**-RCF-**) Findings:  See findings.   Signed by: Lane Ayala 6/5/2024 12:46 AM Dictation workstation:   [id number]    CT head wo IV contrast    Result Date: 6/5/2024  Interpreted By:  Lane Ayala, STUDY: CT HEAD WO IV CONTRAST;  6/5/2024 12:11 am   INDICATION: Signs/Symptoms:fall.   COMPARISON: 04/17/2023   ACCESSION NUMBER(S): [id number]   ORDERING CLINICIAN: COTY OREILLY   TECHNIQUE: Noncontrast axial CT images of head were obtained with coronal and sagittal reconstructed images.   FINDINGS: BRAIN PARENCHYMA: There is extensive chronic small vessel ischemic disease including chronic lacunar infarcts in the right corona radiata and basal ganglia, subinsular white matter, right thalamus, sarah, and diffusely throughout the periventricular white matter. No acute intraparenchymal hemorrhage or parenchymal evidence of acute large territory ischemic infarct. No mass-effect.   VENTRICLES and EXTRA-AXIAL SPACES:  No acute extra-axial or intraventricular hemorrhage. No effacement of cerebral sulci. Ventricles and sulci are age-concordant.   PARANASAL SINUSES/MASTOIDS:  No hemorrhage or air-fluid levels  within the visualized paranasal sinuses. The mastoids are well aerated.   CALVARIUM/ORBITS:  No skull fracture.  The orbits and globes are intact to the extent visualized.   EXTRACRANIAL SOFT TISSUES: No discernible abnormality.       1. No acute intracranial abnormality.   2. Stable, severe burden of chronic small vessel ischemic disease, primarily in the right basal ganglia.   MACRO: None.   Signed by: Lane Ayala 6/5/2024 12:30 AM Dictation workstation:   [id number]    XR knee left 1-2 views    Result Date: 6/5/2024  Interpreted By:  Lane Ayala, STUDY: XR KNEE LEFT 1-2 VIEWS; ;  6/4/2024 11:24 pm   INDICATION: Signs/Symptoms:fall.   COMPARISON: 04/17/2023   ACCESSION NUMBER(S): [id number]   ORDERING CLINICIAN: OCTY OREILLY   FINDINGS: There is redemonstration of severe joint space loss in the lateral compartment and moderate degenerative change of the medial and patellofemoral compartments. There is a small joint effusion. There is no acute fracture or malalignment.       No acute osseous abnormality of the left knee.   Tricompartmental osteoarthrosis severely affecting the lateral compartment with small joint effusion.   MACRO: None.   Signed by: Lane Ayala 6/5/2024 12:27 AM Dictation workstation:   [id number]        Inpatient Medications:  Scheduled medications   Medication Dose Route Frequency    apixaban  10 mg oral BID    Followed by    [START ON 6/12/2024] apixaban  5 mg oral BID    folic acid  1 mg oral Daily    ipratropium-albuteroL  3 mL nebulization TID    lidocaine  1 patch transdermal q24h    magnesium sulfate  4 g intravenous Once    multivitamin with minerals  1 tablet oral Daily    nicotine  1 patch transdermal q24h    Followed by    [START ON 7/17/2024] nicotine  1 patch transdermal q24h    Followed by    [START ON 7/31/2024] nicotine  1 patch transdermal q24h    oxygen   inhalation Continuous - Inhalation    [START ON 6/8/2024] thiamine  100 mg oral Daily    thiamine  100 mg  intravenous Daily     PRN medications   Medication    acetaminophen    LORazepam    Or    LORazepam    Or    LORazepam     Continuous Medications   Medication Dose Last Rate     Physical Exam:   General: Alert and Oriented, No distress, cooperative, appears stated age  Head: Normocephalic without obvious abnormality, atraumatic  Eyes: Conjunctiva/corneas clear, EOM's grossly intact  Nose: No drainage or sinus tenderness  Throat: Mucus membranes moist.  No ulcerations noted  Neck: Supple, trachea midline, No thyroid enlargement/tenderness/nodules; No carotid bruit or JVD  Lungs: Clear to auscultation bilaterally, no wheezes, rhonci, or rales. respirations unlabored  Chest Wall: No tenderness or deformity  Heart: Regular rhythm, normal S1/S2, no murmur  Abdomen: Soft, non-tender, Non-distended, bowel sounds active, no masses  Extremities: No significant pitting edema, no cyanosis, bilateral pedal pulses 2+, well approximated left toe-no oozing or exudate  Skin: Skin color, texture, turgor normal.  No rashes or lesions noted  Neurologic: responses to questions is slower than expected but answers appropriately, Strength equal bilaterally upper extremities, left leg he is able to lift off bed but not all the way, unable to hold leg up with light resistance applied. Right lower leg normal strength. Sensation grossly intact.       Assessment/Plan   Principal Problem:    Multiple pulmonary emboli (Multi)           Problem List Items Addressed This Visit         * (Principal) Multiple pulmonary emboli (Multi) - Primary     Relevant Orders     Lower extremity venous duplex bilateral (Completed)     Transthoracic Echo (TTE) Complete      Other Visit Diagnoses         Generalized weakness         Chronic pain of left knee         Dyspnea on exertion         Relevant Orders     Lower extremity venous duplex bilateral (Completed)     Transthoracic Echo (TTE) Complete          -intermediate to low risk PE for mortality based on  ESC guidelines   -patient without shortness of breath, hemodynamically stable  -no intervention indicated at this time   -echo pending   -okay to transition from heparin drip to Eliquis if okay with attending   -up and ambulating with PT   -encourage ambulation   -keep oxygen saturation greater then 92%   6/6/24  -Tolerated Elqiuis PO -H/H remains stable   -Denies shortness of breath   -Trailed off oxygen on room air and sating 93%   -continue medical management of PE   -check pulse ox during activity with PE to check for hypoxia/oxygen requirements           DVT   -right femoral to popliteal DVT occlusive   -no intervention indicated, patient is w/out swelling or pain in the right leg     6/6/24  -continues to be pain free to the right lower extremity   -no notable swelling to either extremities   -continue medical management  -encourage activity as tolerated   -elevate legs when seated or in bed   -compression stockings if patient develops pain or swelling      Will continue to follow      PERT on call overnight Interventional Radiology [facility] [phone]  Suzy Márquez CNP  6/6/2024  12:00 PM      Code Status:  Full Code    I spent 20 minutes in the professional and overall care of this patient.        Suzy Márquez, APRN-CNP

## 2024-06-06 NOTE — CONSULTS
Nutrition Initial Assessment:   Nutrition Assessment    Reason for Assessment: Admission nursing screening    Medical history per chart: Teddy García is a 63 y.o.  male with a past medical history significant for EtOH abuse with h/o withdrawal seizures / DTs, tobacco use disorder, history of Left Toe Ulcer/Infection in 2022 and 2023, anxiety and depression. Admitted for multiple pulmonary emboli.    Nutrition History:      6/6: Patient awake and resting alone in his room at time of visit. Reports no nutrition related concerns at this time and that his appetite is decent. Says he ate about 75% of his breakfast this morning. Does note some weight loss recently, but could not identify estimated time frame. Noted UBW is about 215#,  current hospital wt is 203#. Offered ensure as supplement to support PO and protein intake, pt was agreeable. Will continue to monitor and follow per protocol.     Current Diet: Adult diet Cardiac; 70 gm fat; 2 - 3 grams Sodium  Supplement(s): No  Average meal Intake during admission: <75%   Average supplement intake during admission: none ordered at this time     Nutrition Related Findings:   Oral Symptoms: none     GI symptoms: no GI issues at this time.   BM: Last BM Date: 06/05/24  Wound Type:  Abrasion to left knee  (nursing/wound notes provide further details)  Edema: No  Food allergies: NKFA. is allergic to hydralazine.  Meds/Labs reviewed.  apixaban, 10 mg, oral, BID   Followed by  [START ON 6/12/2024] apixaban, 5 mg, oral, BID  folic acid, 1 mg, oral, Daily  ipratropium-albuteroL, 3 mL, nebulization, TID  lidocaine, 1 patch, transdermal, q24h  magnesium sulfate, 4 g, intravenous, Once  multivitamin with minerals, 1 tablet, oral, Daily  nicotine, 1 patch, transdermal, q24h   Followed by  [START ON 7/17/2024] nicotine, 1 patch, transdermal, q24h   Followed by  [START ON 7/31/2024] nicotine, 1 patch, transdermal, q24h  oxygen, , inhalation, Continuous - Inhalation  [START ON  "6/8/2024] thiamine, 100 mg, oral, Daily  thiamine, 100 mg, intravenous, Daily             Nutrition Significant Labs:  Results from last 7 days   Lab Units 06/06/24  0452 06/05/24  0427 06/04/24  2224   GLUCOSE mg/dL 157* 159* 139*   SODIUM mmol/L 136 134* 132*   POTASSIUM mmol/L 3.3* 2.9* 3.3*   CHLORIDE mmol/L 101 100 98   CO2 mmol/L 27 26 24   BUN mg/dL 9 14 15   CREATININE mg/dL 0.52 0.62 0.72   EGFR mL/min/1.73m*2 >90 >90 >90   CALCIUM mg/dL 8.3* 8.5* 9.3   PHOSPHORUS mg/dL  --  2.8  --    MAGNESIUM mg/dL 1.31* 1.47*  --      Lab Results   Component Value Date    HGBA1C 5.8 (H) 06/05/2024           Anthropometrics:  Height: 193 cm (6' 4\")   Weight: 92.5 kg (203 lb 14.8 oz)   BMI (Calculated): 24.83  IBW/kg (Dietitian Calculated): 91.8 kg  Percent of IBW: 100.8 %     BMI Amputation Adjustment: No    Weight History:   Wt Readings from Last 10 Encounters:   06/06/24 92.5 kg (203 lb 14.8 oz)   04/25/24 97.5 kg (215 lb)   02/06/24 97.5 kg (215 lb)   01/13/24 97.5 kg (215 lb)   10/21/23 97.5 kg (215 lb)        Weight Change %:  Weight History / % Weight Change: Weight history appears stable with noted ~5% BW loss between 4/25/24 and 6/6/24. Question accuracy of weight history.  Significant Weight Loss: Yes  Interpretation of Weight Loss: 5% in 1 month       Nutrition Focused Physical Exam Findings:    Subcutaneous Fat Loss:   Orbital Fat Pads: Mild-Moderate (slight dark circles and slight hollowing)  Muscle Wasting:  Temporalis: Mild-Moderate (slight depression)  Interosseous: Mild-Moderate (slightly depressed area between thumb and forefinger)  Edema:     Physical Findings:  Skin: Positive (abrasion)    Estimated Needs:   Total Energy Estimated Needs (kCal):  (1278-0440)  Method for Estimating Needs: 25-30kcal/kg IBW  Total Protein Estimated Needs (g):  (91.8-110.2)  Method for Estimating Needs: 1-1.2g/kg IBW  Total Fluid Estimated Needs (mL):  (7943-4744)  Method for Estimating Needs: 1mL/kcal        Nutrition " Diagnosis   Nutrition Diagnosis:  Malnutrition Diagnosis  Patient has Malnutrition Diagnosis: Yes  Diagnosis Status: New  Malnutrition Diagnosis: Moderate malnutrition related to chronic disease or condition  As Evidenced by: 5% weight loss x1 month, mild-moderate muscle and fat wasting            Nutrition Interventions/Recommendations   Nutrition Interventions and Recommendations:        Nutrition Prescription:  Individualized Nutrition Prescription Provided for : Moderate malnutrition, addition of ONS        Nutrition Interventions:   Food and/or Nutrient Delivery Interventions  Interventions: Meals and snacks, Medical food supplement  Meals and Snacks: Fat-modified diet, Mineral-modified diet  Medical Food Supplement: Commercial beverage  Goal: Ensure Plus HP BID         Nutrition Education:   Education Documentation  No documentation found.      Nutrition Counseling  Counseling Theoretical Approach: Other (Comment)  Goal: Discussed supplement options and encouraged meal intake       Nutrition Monitoring and Evaluation   Monitoring/Evaluation:   Food/Nutrient Related History Monitoring  Monitoring and Evaluation Plan: Energy intake  Energy Intake: Estimated energy intake  Criteria: >75% of needs    Body Composition/Growth/Weight History  Monitoring and Evaluation Plan: Weight  Weight: Measured weight  Criteria: Stable    Biochemical Data, Medical Tests and Procedures  Monitoring and Evaluation Plan: Electrolyte/renal panel, Glucose/endocrine profile  Electrolyte and Renal Panel: BUN, Creatinine, Magnesium, Potassium, Sodium  Criteria: WNL  Glucose/Endocrine Profile: Glucose, casual, Hemoglobin A1c (HgbA1c)  Criteria: WNL    Nutrition Focused Physical Findings  Monitoring and Evaluation Plan: Skin  Skin: Other (Comment)  Criteria: Maintain skin integrity            Time Spent/Follow-up Reminder:   Follow Up  Time Spent (min): 45 minutes  Last Date of Nutrition Visit: 06/06/24  Nutrition Follow-Up Needed?:  Dietitian to reassess per policy  Follow up Comment: 6/10-6/11

## 2024-06-06 NOTE — PROGRESS NOTES
Physical Therapy                 Therapy Communication Note    Patient Name: Teddy García  MRN: [id number]  Today's Date: 6/6/2024     Discipline: Physical Therapy    Missed Visit Reason: Missed Visit Reason:  (transport arrived to take pt to step down. PTA to attempt at later time.)    Missed Time: Attempt    Comment:

## 2024-06-06 NOTE — CARE PLAN
Problem: Skin  Goal: Participates in plan/prevention/treatment measures  Outcome: Progressing  Goal: Prevent/manage excess moisture  Outcome: Progressing  Goal: Prevent/minimize sheer/friction injuries  Outcome: Progressing  Goal: Promote/optimize nutrition  Outcome: Progressing     Problem: Fall/Injury  Goal: Be free from injury by end of the shift  Outcome: Progressing     Problem: Deep Vein Thrombosis  Goal: I will remain free from complications of deep vein thrombosis and maintain current level of mobility  Outcome: Progressing     Problem: Pain  Goal: Takes deep breaths with improved pain control throughout the shift  Outcome: Progressing  Goal: Turns in bed with improved pain control throughout the shift  Outcome: Progressing

## 2024-06-06 NOTE — PROGRESS NOTES
Social work consult placed for SNF, met with pt and/or family to assess needs and provide support, introduced self and my role as  with care transition team. Explored pt's thoughts toward drinking behaviors, awareness, and willingness for tx. Pt expressed knowing this is what he needs in order to get better. Pt was in agreement with a dual diagnosis facility per TCC. SW discussed possible facilities that can accommodate the dual diagnosis needs with pt that are under his insurace. Pt identified FOC as [facility], [facility], and [facility]. SW sent to team requesting referrals to be sent. Updated TCC.    Statement Selected

## 2024-06-06 NOTE — CARE PLAN
The patient's goals for the shift include      The clinical goals for the shift include safety    Problem: Skin  Goal: Participates in plan/prevention/treatment measures  Outcome: Progressing  Goal: Prevent/manage excess moisture  Outcome: Progressing  Goal: Prevent/minimize sheer/friction injuries  Outcome: Progressing  Goal: Promote/optimize nutrition  Outcome: Progressing     Problem: Fall/Injury  Goal: Be free from injury by end of the shift  Outcome: Progressing     Problem: Deep Vein Thrombosis  Goal: I will remain free from complications of deep vein thrombosis and maintain current level of mobility  Outcome: Progressing     Problem: Pain  Goal: Takes deep breaths with improved pain control throughout the shift  Outcome: Progressing  Goal: Turns in bed with improved pain control throughout the shift  Outcome: Progressing      Schizoaffective disorder    Seizure    Tourette disease

## 2024-06-06 NOTE — PROGRESS NOTES
Occupational Therapy    OT Treatment    Patient Name: Teddy García  MRN: [id number]  Today's Date: 6/6/2024  Time Calculation  Start Time: 1300  Stop Time: 1315  Time Calculation (min): 15 min         Assessment:  OT Assessment: Pt progressed to participating in functional transfers with decreased assist of mod A x2 using FWW.  End of Session Communication: Bedside nurse  End of Session Patient Position: Alarm on, Up in chair     Plan:  Treatment Interventions: ADL retraining, Functional transfer training, UE strengthening/ROM, Endurance training, Cognitive reorientation, Patient/family training, Equipment evaluation/education, Neuromuscular reeducation, Compensatory technique education  OT Frequency: 3 times per week  OT Discharge Recommendations: Moderate intensity level of continued care  Equipment Recommended upon Discharge:  (TBD)  OT Recommended Transfer Status: Maximum assist, Assist of 2  OT - OK to Discharge: Yes (ok to dc to next level of care once medically cleared)  Treatment Interventions: ADL retraining, Functional transfer training, UE strengthening/ROM, Endurance training, Cognitive reorientation, Patient/family training, Equipment evaluation/education, Neuromuscular reeducation, Compensatory technique education    Subjective   Previous Visit Info:  OT Last Visit  OT Received On: 06/06/24  General:  General  Prior to Session Communication: Bedside nurse  Patient Position Received: Bed, 3 rail up, Alarm on  General Comment: Pt agreeable to therapy.  Precautions:  Medical Precautions: Fall precautions, Oxygen therapy device and L/min  Vital Signs:     Pain:  Pain Assessment  Pain Score:  (did not rate)  Pain Location: Generalized    Objective    Cognition:  Cognition  Overall Cognitive Status: Within Functional Limits            Functional Standing Tolerance:  Activity: Pt completed x5 stands from chair for 15-30 seconds with min to mod A x2 using walker for stability.  Bed Mobility/Transfers:       Transfer 1  Technique 1: Sit to stand, Stand to sit  Transfer Device 1: Walker  Transfer Level of Assistance 1: Moderate assistance, +2, Moderate verbal cues         Therapy/Activity: Therapeutic Exercise  Therapeutic Exercise Performed: Yes  Therapeutic Exercise Activity 1: Pt instructed on BUE ther ex completing x12 reps of elbow flexion/extension, horizontal abd. shoulder flexion.          Outcome Measures:[facility] Daily Activity  Putting on and taking off regular lower body clothing: A lot  Bathing (including washing, rinsing, drying): A lot  Putting on and taking off regular upper body clothing: A lot  Toileting, which includes using toilet, bedpan or urinal: A lot  Taking care of personal grooming such as brushing teeth: A little  Eating Meals: A little  Daily Activity - Total Score: 14        Education Documentation  Body Mechanics, taught by ELVIA Newsome at 6/6/2024  1:53 PM.  Learner: Patient  Readiness: Acceptance  Method: Explanation  Response: Needs Reinforcement    Education Comments  No comments found.        Goals:  Encounter Problems       Encounter Problems (Active)       ADLs       Patient will perform UB and LB bathing  with minimal assist  and moderate assist level of assistance . (Progressing)       Start:  06/05/24    Expected End:  06/19/24               TRANSFERS       Patient will complete sit to stand transfer with moderate assist level of assistance and front wheeled walker in order to improve safety and prepare for out of bed mobility. (Progressing)       Start:  06/05/24    Expected End:  06/19/24

## 2024-06-06 NOTE — CARE PLAN
Patient following safety instructions to avoid fall, reluctant to get OOB with PT.   Patient does not like to lay on sides, he will let us turn him but he will pull pillows out to lay on his back before time is up.  Patient is breathing shallow due to discomfort from bilateral PEs he has when he takes deep breaths, encouraged to take deep breaths, also has a IS.   Patient has tylenol for pain and lidocaine patches ordered..  Started on Eliquis  for both PEs and DVT but raises a concern due to patient falling.

## 2024-06-07 LAB
ALBUMIN SERPL BCP-MCNC: 3 G/DL (ref 3.4–5)
ALP SERPL-CCNC: 73 U/L (ref 33–136)
ALT SERPL W P-5'-P-CCNC: 81 U/L (ref 10–52)
ANION GAP SERPL CALC-SCNC: 9 MMOL/L (ref 10–20)
AST SERPL W P-5'-P-CCNC: 54 U/L (ref 9–39)
BASOPHILS # BLD AUTO: 0.02 X10*3/UL (ref 0–0.1)
BASOPHILS NFR BLD AUTO: 0.4 %
BILIRUB SERPL-MCNC: 0.5 MG/DL (ref 0–1.2)
BUN SERPL-MCNC: 12 MG/DL (ref 6–23)
CALCIUM SERPL-MCNC: 8 MG/DL (ref 8.6–10.3)
CHLORIDE SERPL-SCNC: 101 MMOL/L (ref 98–107)
CO2 SERPL-SCNC: 28 MMOL/L (ref 21–32)
CREAT SERPL-MCNC: 0.5 MG/DL (ref 0.5–1.3)
EGFRCR SERPLBLD CKD-EPI 2021: >90 ML/MIN/1.73M*2
EOSINOPHIL # BLD AUTO: 0.17 X10*3/UL (ref 0–0.7)
EOSINOPHIL NFR BLD AUTO: 3.3 %
ERYTHROCYTE [DISTWIDTH] IN BLOOD BY AUTOMATED COUNT: 14 % (ref 11.5–14.5)
GLUCOSE SERPL-MCNC: 211 MG/DL (ref 74–99)
HCT VFR BLD AUTO: 37.1 % (ref 41–52)
HGB BLD-MCNC: 13.1 G/DL (ref 13.5–17.5)
IMM GRANULOCYTES # BLD AUTO: 0.02 X10*3/UL (ref 0–0.7)
IMM GRANULOCYTES NFR BLD AUTO: 0.4 % (ref 0–0.9)
INR PPP: 1.3 (ref 0.9–1.1)
LYMPHOCYTES # BLD AUTO: 1.22 X10*3/UL (ref 1.2–4.8)
LYMPHOCYTES NFR BLD AUTO: 24 %
MAGNESIUM SERPL-MCNC: 1.6 MG/DL (ref 1.6–2.4)
MCH RBC QN AUTO: 32.3 PG (ref 26–34)
MCHC RBC AUTO-ENTMCNC: 35.3 G/DL (ref 32–36)
MCV RBC AUTO: 92 FL (ref 80–100)
MONOCYTES # BLD AUTO: 0.51 X10*3/UL (ref 0.1–1)
MONOCYTES NFR BLD AUTO: 10 %
NEUTROPHILS # BLD AUTO: 3.15 X10*3/UL (ref 1.2–7.7)
NEUTROPHILS NFR BLD AUTO: 61.9 %
NRBC BLD-RTO: 0 /100 WBCS (ref 0–0)
PLATELET # BLD AUTO: 146 X10*3/UL (ref 150–450)
POTASSIUM SERPL-SCNC: 3.3 MMOL/L (ref 3.5–5.3)
PROT SERPL-MCNC: 6 G/DL (ref 6.4–8.2)
PROTHROMBIN TIME: 15 SECONDS (ref 9.8–12.8)
RBC # BLD AUTO: 4.05 X10*6/UL (ref 4.5–5.9)
SODIUM SERPL-SCNC: 135 MMOL/L (ref 136–145)
WBC # BLD AUTO: 5.1 X10*3/UL (ref 4.4–11.3)

## 2024-06-07 PROCEDURE — 2500000004 HC RX 250 GENERAL PHARMACY W/ HCPCS (ALT 636 FOR OP/ED): Performed by: INTERNAL MEDICINE

## 2024-06-07 PROCEDURE — 97110 THERAPEUTIC EXERCISES: CPT | Mod: GP,CQ

## 2024-06-07 PROCEDURE — 83735 ASSAY OF MAGNESIUM: CPT | Performed by: INTERNAL MEDICINE

## 2024-06-07 PROCEDURE — 99233 SBSQ HOSP IP/OBS HIGH 50: CPT | Performed by: INTERNAL MEDICINE

## 2024-06-07 PROCEDURE — S4991 NICOTINE PATCH NONLEGEND: HCPCS | Performed by: INTERNAL MEDICINE

## 2024-06-07 PROCEDURE — 2500000005 HC RX 250 GENERAL PHARMACY W/O HCPCS: Performed by: INTERNAL MEDICINE

## 2024-06-07 PROCEDURE — 36415 COLL VENOUS BLD VENIPUNCTURE: CPT | Performed by: INTERNAL MEDICINE

## 2024-06-07 PROCEDURE — 2500000002 HC RX 250 W HCPCS SELF ADMINISTERED DRUGS (ALT 637 FOR MEDICARE OP, ALT 636 FOR OP/ED): Performed by: INTERNAL MEDICINE

## 2024-06-07 PROCEDURE — 80053 COMPREHEN METABOLIC PANEL: CPT | Performed by: INTERNAL MEDICINE

## 2024-06-07 PROCEDURE — 2500000001 HC RX 250 WO HCPCS SELF ADMINISTERED DRUGS (ALT 637 FOR MEDICARE OP): Performed by: INTERNAL MEDICINE

## 2024-06-07 PROCEDURE — 85610 PROTHROMBIN TIME: CPT | Performed by: INTERNAL MEDICINE

## 2024-06-07 PROCEDURE — 97116 GAIT TRAINING THERAPY: CPT | Mod: GP,CQ

## 2024-06-07 PROCEDURE — 85025 COMPLETE CBC W/AUTO DIFF WBC: CPT | Performed by: INTERNAL MEDICINE

## 2024-06-07 PROCEDURE — 1200000002 HC GENERAL ROOM WITH TELEMETRY DAILY

## 2024-06-07 PROCEDURE — 97535 SELF CARE MNGMENT TRAINING: CPT | Mod: GO,CO

## 2024-06-07 PROCEDURE — 97530 THERAPEUTIC ACTIVITIES: CPT | Mod: GO,CO

## 2024-06-07 RX ORDER — AMOXICILLIN 250 MG
2 CAPSULE ORAL 2 TIMES DAILY
Status: DISCONTINUED | OUTPATIENT
Start: 2024-06-07 | End: 2024-06-10 | Stop reason: HOSPADM

## 2024-06-07 RX ORDER — POLYETHYLENE GLYCOL 3350 17 G/17G
17 POWDER, FOR SOLUTION ORAL DAILY
Status: DISCONTINUED | OUTPATIENT
Start: 2024-06-07 | End: 2024-06-10 | Stop reason: HOSPADM

## 2024-06-07 RX ADMIN — APIXABAN 10 MG: 5 TABLET, FILM COATED ORAL at 20:37

## 2024-06-07 RX ADMIN — LIDOCAINE 4% 1 PATCH: 40 PATCH TOPICAL at 10:21

## 2024-06-07 RX ADMIN — NICOTINE 1 PATCH: 21 PATCH, EXTENDED RELEASE TRANSDERMAL at 00:09

## 2024-06-07 RX ADMIN — Medication 1 TABLET: at 10:12

## 2024-06-07 RX ADMIN — Medication 0.5 L/MIN: at 09:30

## 2024-06-07 RX ADMIN — FOLIC ACID 1 MG: 1 TABLET ORAL at 10:13

## 2024-06-07 RX ADMIN — APIXABAN 10 MG: 5 TABLET, FILM COATED ORAL at 10:12

## 2024-06-07 RX ADMIN — SENNOSIDES AND DOCUSATE SODIUM 2 TABLET: 50; 8.6 TABLET ORAL at 20:37

## 2024-06-07 RX ADMIN — Medication 2 L/MIN: at 20:00

## 2024-06-07 RX ADMIN — SENNOSIDES AND DOCUSATE SODIUM 2 TABLET: 50; 8.6 TABLET ORAL at 10:12

## 2024-06-07 RX ADMIN — THIAMINE HYDROCHLORIDE 100 MG: 100 INJECTION, SOLUTION INTRAMUSCULAR; INTRAVENOUS at 10:13

## 2024-06-07 RX ADMIN — POLYETHYLENE GLYCOL 3350 17 G: 17 POWDER, FOR SOLUTION ORAL at 10:12

## 2024-06-07 ASSESSMENT — LIFESTYLE VARIABLES
NAUSEA AND VOMITING: NO NAUSEA AND NO VOMITING
ANXIETY: NO ANXIETY, AT EASE
ORIENTATION AND CLOUDING OF SENSORIUM: ORIENTED AND CAN DO SERIAL ADDITIONS
AUDITORY DISTURBANCES: NOT PRESENT
BLOOD PRESSURE: 146/78
PAROXYSMAL SWEATS: NO SWEAT VISIBLE
TREMOR: NO TREMOR
PULSE: 83
VISUAL DISTURBANCES: NOT PRESENT
TOTAL SCORE: 0
HEADACHE, FULLNESS IN HEAD: NOT PRESENT
AGITATION: NORMAL ACTIVITY

## 2024-06-07 ASSESSMENT — COGNITIVE AND FUNCTIONAL STATUS - GENERAL
PERSONAL GROOMING: A LITTLE
MOVING TO AND FROM BED TO CHAIR: A LOT
WALKING IN HOSPITAL ROOM: A LOT
DRESSING REGULAR LOWER BODY CLOTHING: A LOT
MOBILITY SCORE: 11
DRESSING REGULAR LOWER BODY CLOTHING: TOTAL
EATING MEALS: A LITTLE
TOILETING: A LOT
MOVING FROM LYING ON BACK TO SITTING ON SIDE OF FLAT BED WITH BEDRAILS: A LOT
HELP NEEDED FOR BATHING: TOTAL
HELP NEEDED FOR BATHING: A LOT
TURNING FROM BACK TO SIDE WHILE IN FLAT BAD: A LOT
STANDING UP FROM CHAIR USING ARMS: A LOT
CLIMB 3 TO 5 STEPS WITH RAILING: TOTAL
MOVING FROM LYING ON BACK TO SITTING ON SIDE OF FLAT BED WITH BEDRAILS: A LITTLE
TOILETING: TOTAL
WALKING IN HOSPITAL ROOM: A LOT
DAILY ACTIVITIY SCORE: 8
PERSONAL GROOMING: A LOT
HELP NEEDED FOR BATHING: A LOT
STANDING UP FROM CHAIR USING ARMS: A LOT
MOBILITY SCORE: 13
EATING MEALS: A LITTLE
DRESSING REGULAR UPPER BODY CLOTHING: TOTAL
DRESSING REGULAR UPPER BODY CLOTHING: A LOT
MOVING TO AND FROM BED TO CHAIR: A LOT
TOILETING: A LOT
MOVING TO AND FROM BED TO CHAIR: A LOT
EATING MEALS: A LITTLE
TURNING FROM BACK TO SIDE WHILE IN FLAT BAD: A LOT
MOBILITY SCORE: 12
DAILY ACTIVITIY SCORE: 13
DRESSING REGULAR LOWER BODY CLOTHING: A LOT
CLIMB 3 TO 5 STEPS WITH RAILING: TOTAL
DRESSING REGULAR UPPER BODY CLOTHING: A LOT
TURNING FROM BACK TO SIDE WHILE IN FLAT BAD: A LOT
STANDING UP FROM CHAIR USING ARMS: A LOT
DAILY ACTIVITIY SCORE: 14
CLIMB 3 TO 5 STEPS WITH RAILING: TOTAL
MOVING FROM LYING ON BACK TO SITTING ON SIDE OF FLAT BED WITH BEDRAILS: A LITTLE
WALKING IN HOSPITAL ROOM: A LITTLE
PERSONAL GROOMING: TOTAL

## 2024-06-07 ASSESSMENT — PAIN SCALES - GENERAL: PAINLEVEL_OUTOF10: 0 - NO PAIN

## 2024-06-07 ASSESSMENT — PAIN - FUNCTIONAL ASSESSMENT: PAIN_FUNCTIONAL_ASSESSMENT: 0-10

## 2024-06-07 ASSESSMENT — ACTIVITIES OF DAILY LIVING (ADL)
HOME_MANAGEMENT_TIME_ENTRY: 15
HOME_MANAGEMENT_TIME_ENTRY: 13

## 2024-06-07 NOTE — PROGRESS NOTES
Physical Therapy    Physical Therapy Treatment    Patient Name: Teddy García  MRN: [id number]  Today's Date: 6/7/2024  Time Calculation  Start Time: 1036  Stop Time: 1102  Time Calculation (min): 26 min    Assessment/Plan   PT Assessment  End of Session Communication: Bedside nurse  End of Session Patient Position: Up in chair, Alarm on     PT Plan  Treatment/Interventions: Bed mobility, Transfer training, Gait training, Neuromuscular re-education, Therapeutic exercise, Therapeutic activity  PT Plan: Skilled PT  PT Frequency: 4 times per week  PT Discharge Recommendations: Moderate intensity level of continued care, 24 hr supervision due to cognition  PT Recommended Transfer Status: Assist x2  PT - OK to Discharge: Yes (to next level of care when cleared by medical team)    General Visit Information:   PT  Visit  PT Received On: 06/07/24  General  Prior to Session Communication: Bedside nurse  Patient Position Received: Bed, 3 rail up, Alarm on    Subjective   Precautions:  Precautions  Medical Precautions: Fall precautions  Precautions Comment: CIWA protocol. RLE DVT's, multiple PE on Eliquis    Objective   Pain:  Pain Assessment  Pain Assessment: 0-10  Pain Score:  (no rating)  Pain Location: Generalized  Cognition:  Cognition  Orientation Level: Oriented X4    Treatments:  Therapeutic Exercise  Therapeutic Exercise Performed: Yes  Therapeutic Exercise Activity 1: pt  completed seated LE exercises: AP x 10 reps, LAQ x 10 reps, hip flexion x 10 reps, hip adduction x 10 reps, hip abduction x 10 reps    Therapeutic Activity  Therapeutic Activity Performed: Yes  Therapeutic Activity 1: pt completed a dynamic stand at bathroom sink for 3 min with Min A    Bed Mobility  Bed Mobility: Yes  Bed Mobility 1  Bed Mobility 1: Supine to sitting  Level of Assistance 1: Moderate assistance, +2, Moderate verbal cues    Ambulation/Gait Training  Ambulation/Gait Training Performed: Yes  Ambulation/Gait Training 1  Surface 1: Level  tile  Device 1: Rolling walker  Assistance 1: Minimum assistance, Moderate verbal cues, Moderate tactile cues (+2)  Quality of Gait 1: Shuffling gait, Decreased step length, Forward flexed posture  Comments/Distance (ft) 1: 4 feet, 10 feet, 30 feet x 2; pt cued to stand tall and for safe navigation of walker  Transfers  Transfer: Yes  Transfer 1  Technique 1: Sit to stand, Stand to sit  Transfer Device 1: Walker  Transfer Level of Assistance 1: Minimum assistance, +2, Minimal verbal cues, Minimal tactile cues    Outcome Measures:  [facility] Basic Mobility  Turning from your back to your side while in a flat bed without using bedrails: A little  Moving from lying on your back to sitting on the side of a flat bed without using bedrails: A lot  Moving to and from bed to chair (including a wheelchair): A lot  Standing up from a chair using your arms (e.g. wheelchair or bedside chair): A lot  To walk in hospital room: A lot  Climbing 3-5 steps with railing: Total  Basic Mobility - Total Score: 12    Education Documentation  Mobility Training, taught by Neha Sloan PTA at 6/7/2024 11:47 AM.  Learner: Patient  Readiness: Acceptance  Method: Explanation  Response: Needs Reinforcement    Education Comments  No comments found.        OP EDUCATION:       Encounter Problems       Encounter Problems (Active)       Mobility       STG - Patient will ambulate 5-10 ft with FWW (uses ROLLATOR at home) to access chair/BSC to reduce bedrest (Progressing)       Start:  06/05/24    Expected End:  06/19/24               PT Transfers       STG - Patient will transfer from one surface to another with reduced caregiver nassist (Progressing)       Start:  06/05/24    Expected End:  06/19/24               Pain - Adult          strength       STG-pt will perform 10-20 reps AROM BLE (caution RLE DVT's) (Progressing)       Start:  06/05/24    Expected End:  06/19/24

## 2024-06-07 NOTE — CARE PLAN
The patient's goals for the shift include  safety and rest.    The clinical goals for the shift include Patient to remain free from falls    Over the shift, the patient did make progress towards goals. All needs met, safety maintained.

## 2024-06-07 NOTE — PROGRESS NOTES
06/07/24 1540   Discharge Planning   Living Arrangements Alone   Support Systems /   Assistance Needed ADLs IADLs   Type of Residence Private residence   Home or Post Acute Services Post acute facilities (Rehab/SNF/etc)   Type of Post Acute Facility Services Skilled nursing   Patient expects to be discharged to: [facility]   Does the patient need discharge transport arranged? Yes   RoundTrip coordination needed? Yes   Has discharge transport been arranged? No     Per patient request,Sent referrals to [facility], [facility] and [facility] and Rehab, patient chose [facility] SN[facility], facility accepted, facility started precert today. Plan for  weekend discharge. Oncoming TCC to follow through hospital course for any changes in discharge plan.

## 2024-06-07 NOTE — PROGRESS NOTES
Occupational Therapy    OT Treatment    Patient Name: Teddy García  MRN: [id number]  Today's Date: 6/7/2024  Time Calculation  Start Time: 1037  Stop Time: 1102  Time Calculation (min): 25 min         Assessment:  OT Assessment: Pt progressed activity tolerance to completing funcitonal mobility min household distance using FWW with min A x2. Pt tolerated activity well also progressed to requiring decreased assist for funcitonal transfers/mobility.  End of Session Communication: Bedside nurse  End of Session Patient Position: Up in chair, Alarm on     Plan:  Treatment Interventions: ADL retraining, Functional transfer training, UE strengthening/ROM, Endurance training, Cognitive reorientation, Patient/family training, Equipment evaluation/education, Neuromuscular reeducation, Compensatory technique education  OT Frequency: 3 times per week  OT Discharge Recommendations: Moderate intensity level of continued care  Equipment Recommended upon Discharge:  (TBD)  OT Recommended Transfer Status: Maximum assist, Assist of 2  OT - OK to Discharge: Yes (ok to dc to next level of care once medically cleared)  Treatment Interventions: ADL retraining, Functional transfer training, UE strengthening/ROM, Endurance training, Cognitive reorientation, Patient/family training, Equipment evaluation/education, Neuromuscular reeducation, Compensatory technique education    Subjective   Previous Visit Info:  OT Last Visit  OT Received On: 06/07/24  General:  General  Prior to Session Communication: Bedside nurse  Patient Position Received: Bed, 3 rail up, Alarm on  General Comment: Pt agreeable to therapy with encouragment.  Precautions:  Medical Precautions: Fall precautions  Vital Signs:     Pain:  Pain Assessment  Pain Score:  (did not rate)  Pain Location: Generalized    Objective    Cognition:  Cognition  Orientation Level: Oriented X4  Coordination:     Activities of Daily Living:      Grooming  Grooming Level of Assistance: Minimum  assistance  Grooming Where Assessed: Standing sinkside                   LE Dressing  LE Dressing: Yes  Pants Level of Assistance: Maximum assistance  LE Dressing Where Assessed: Edge of bed       Functional Standing Tolerance:  Activity: Pt tolerated standing with intervals of dyn activity for 3 minutes with min A using umilateral UE support for stability.  Bed Mobility/Transfers: Bed Mobility 1  Bed Mobility 1: Supine to sitting  Level of Assistance 1: Moderate assistance, +2, Moderate verbal cues    Transfer 1  Transfer From 1: Bed to  Transfer to 1: Chair with arms  Technique 1: Sit to stand, Stand to sit  Transfer Device 1: Walker  Transfer Level of Assistance 1: Minimum assistance, +2, Minimal verbal cues, Minimal tactile cues                        Functional Mobility:  Functional Mobility 1  Comments 1: Pt participated in functional mobility min household distance x2 trials with min A x1-2 using FWW.       Therapy/Activity: Therapeutic Exercise  Therapeutic Exercise Activity 1: Pt instructed on BUE ther ex completing A/AAROM x10 reps of elbow flexion/extension, shoulder flexion and horizontal abd.        Outcome Measures:[facility] Daily Activity  Putting on and taking off regular lower body clothing: A lot  Bathing (including washing, rinsing, drying): A lot  Putting on and taking off regular upper body clothing: A lot  Toileting, which includes using toilet, bedpan or urinal: A lot  Taking care of personal grooming such as brushing teeth: A little  Eating Meals: A little  Daily Activity - Total Score: 14        Education Documentation  Body Mechanics, taught by ELVIA Newsome at 6/7/2024  3:32 PM.  Learner: Patient  Readiness: Acceptance  Method: Explanation  Response: Needs Reinforcement    Education Comments  No comments found.          Goals:  Encounter Problems       Encounter Problems (Active)       ADLs       Patient will perform UB and LB bathing  with minimal assist  and moderate assist level  of assistance . (Progressing)       Start:  06/05/24    Expected End:  06/19/24               TRANSFERS       Patient will complete sit to stand transfer with moderate assist level of assistance and front wheeled walker in order to improve safety and prepare for out of bed mobility. (Progressing)       Start:  06/05/24    Expected End:  06/19/24

## 2024-06-07 NOTE — CARE PLAN
Problem: Skin  Goal: Participates in plan/prevention/treatment measures  Outcome: Progressing  Goal: Prevent/manage excess moisture  Outcome: Progressing  Goal: Prevent/minimize sheer/friction injuries  Outcome: Progressing  Goal: Promote/optimize nutrition  Outcome: Progressing     Problem: Fall/Injury  Goal: Be free from injury by end of the shift  Outcome: Progressing  Goal: Not fall by end of shift  Outcome: Progressing  Goal: Verbalize understanding of personal risk factors for fall in the hospital  Outcome: Progressing  Goal: Verbalize understanding of risk factor reduction measures to prevent injury from fall in the home  Outcome: Progressing  Goal: Use assistive devices by end of the shift  Outcome: Progressing  Goal: Pace activities to prevent fatigue by end of the shift  Outcome: Progressing     Problem: Deep Vein Thrombosis  Goal: I will remain free from complications of deep vein thrombosis and maintain current level of mobility  Outcome: Progressing     Problem: Pain  Goal: Takes deep breaths with improved pain control throughout the shift  Outcome: Progressing  Goal: Turns in bed with improved pain control throughout the shift  Outcome: Progressing     Problem: Pain - Adult  Goal: Verbalizes/displays adequate comfort level or baseline comfort level  Outcome: Progressing     Problem: Safety - Adult  Goal: Free from fall injury  Outcome: Progressing     Problem: Discharge Planning  Goal: Discharge to home or other facility with appropriate resources  Outcome: Progressing     Problem: Chronic Conditions and Co-morbidities  Goal: Patient's chronic conditions and co-morbidity symptoms are monitored and maintained or improved  Outcome: Progressing     Problem: Nutrition  Goal: Oral intake greater 75%  Outcome: Progressing  Goal: Consume prescribed supplement  Outcome: Progressing  Goal: Promote healing  Outcome: Progressing  Goal: Maintain stable weight  Outcome: Progressing       The clinical goals for  the shift include Patient to remain free from falls

## 2024-06-07 NOTE — PROGRESS NOTES
PROGRESS NOTE    Teddy García is a 63 y.o. male on day 1 of admission presenting with Multiple pulmonary emboli (Multi).    SUBJECTIVE:    History Of Present Illness:  Teddy García is a 63 y.o.  male with a past medical history significant for EtOH abuse with h/o withdrawal seizures / DTs, tobacco use disorder, history of Left Toe Ulcer/Infection in 2022 and 2023, anxiety and depression. He does have a history of ambulatory dysfunction and states he uses a rollator at baseline. He states he smokes and drinks daily but unfortunately would not characterize exactly how much. He states he fell onto his left knee a few days ago and it has been hurting. Overall he feels unwell for over a week he said maybe. He states he may have fallen a week ago but he didn't really remember.  He had also noted some pain in his left lower rib particularly when he takes a deep breath then which she thought was due to him pulling a muscle versus his recurrent falls over the past several days.  Denies initiation of any new medications.  He denied any recent sick contacts, chemical/environmental exposures, changes in dietary habits or any recent traumatic events/falls other than noted above.  He denies any fevers, chills, night sweats, vision changes, auditory changes, change in taste/smell, loss of bowel/bladder control, loss consciousness, dizziness, vertigo, syncope, seizure-like activity, chest pain, palpitations, shortness of breath, coughing, wheezing, congestion, hemoptysis, hematemesis, abdominal pain, nausea, vomiting, diarrhea, constipation, dysuria, hematuria, dyschezia, hematochezia or any lateralizing motor/sensory deficits other than noted above.     Patient stated that over the past 2 days maybe longer he really has not been able to get up out of his couch other than to really go to the bathroom and at times has had significant difficulties getting up and off of the toilet and has spent several hours laying on  his couch or sitting on the toilet.  Neighbors had called EMS for a well check and they had noted that the patient was quite unkempt as well as his house and a bit of disarray as well.  He states that he has had very poor p.o. intake over the past several days as well due to significant weakness and inability to get some food and has not had anything to eat since yesterday morning.    6/5 Spoke with the patient today and he appeared tired, but alert and oriented. He reported shortness of breath, and chest pain during inspiration, particularly on the left side of chest. He denies any palpitations, lower extremity pain or heaviness.   Overnight events: Patient desaturated to mid 80s and placed on 4 L Oxygen. Positive Lower extremity venous duplex scan.  6/6: No acute events overnight.  Patient is alert and oriented.  He is still very frail.  He has significant pleuritic chest pain.  He is very shaky.  Started on Eliquis.  PT and OT recommendation appreciated.  Continue alcohol withdrawal protocol.  Patient will require skilled nursing facility on discharge.    OBJECTIVE:  Objective     Last Recorded Vitals  /84 (BP Location: Right arm, Patient Position: Lying)   Pulse 71   Temp 36.7 °C (98 °F) (Temporal)   Resp 20   Wt 92.5 kg (203 lb 14.8 oz)   SpO2 93%     Intake/Output last 3 Shifts:    Intake/Output Summary (Last 24 hours) at 6/6/2024 2000  Last data filed at 6/6/2024 1113  Gross per 24 hour   Intake 290 ml   Output 450 ml   Net -160 ml       Admission Weight  Weight: 97.5 kg (215 lb) (06/04/24 2038)    Daily Weight  06/06/24 : 92.5 kg (203 lb 14.8 oz)      Image Results  Transthoracic Echo (TTE) [place] [id number]       Phone [phone] Fax [phone]    TRANSTHORACIC ECHOCARDIOGRAM REPORT       Patient Name:      LILIANE ARGUELLES        Reading Physician:    [id number] Mohr                                                                  [address]  Study Date:        6/5/2024              Ordering Provider:    [id number] CAROLYN CAPPS  MRN/PID:           [id number]              Fellow:  Accession#:        [id number]          Nurse:                Selina - PATY RN  Date of Birth/Age: 1960 / 63 years Sonographer:          Myrtle Shelley RDCS  Gender:            M                     Additional Staff:  Height:            193.04 cm             Admit Date:           6/4/2024  Weight:            97.52 kg              Admission Status:     Inpatient - STAT  BSA / BMI:         2.28 m2 / 26.17 kg/m2 Department Location:  [facility]  Blood Pressure: 118 /76 mmHg    Study Type:    TRANSTHORACIC ECHO (TTE) COMPLETE  Diagnosis/ICD: Other pulmonary embolism without acute cor pulmonale-I26.99  Indication:    Dyspnea on Exertion  CPT Codes:     Echo Complete w Full Doppler-[id number]    Patient History:  Pertinent History: PE.    Study Detail: The following Echo studies were performed: 2D, M-Mode, Doppler and                color flow. Technically challenging study due to body habitus and                poor acoustic windows. Optison used as a contrast agent for                endocardial border definition. Total contrast used for this                procedure was 3 mL via IV push.       PHYSICIAN INTERPRETATION:  Left Ventricle: Left ventricular systolic function is normal, with an estimated ejection fraction of 55%. There are no regional wall motion abnormalities. The left ventricular cavity size is normal. Spectral Doppler shows a normal pattern of left ventricular diastolic filling.  Left Atrium: The left atrium is normal in size.  Right Ventricle: The right ventricle is normal in size. There is normal right ventricular global systolic function.  Right Atrium: The right atrium is normal in size.  Aortic Valve: The aortic valve is trileaflet. There is no evidence of aortic valve  regurgitation. The peak instantaneous gradient of the aortic valve is 5.5 mmHg. The mean gradient of the aortic valve is 3.0 mmHg.  Mitral Valve: The mitral valve is normal in structure. There is no evidence of mitral valve regurgitation.  Tricuspid Valve: The tricuspid valve is structurally normal. There is trace tricuspid regurgitation.  Pulmonic Valve: The pulmonic valve is structurally normal. There is no indication of pulmonic valve regurgitation.  Pericardium: There is no pericardial effusion noted. There is a pericardial fat pad present.  Aorta: The aortic root is abnormal. There is mild dilatation of the ascending aorta. There is mild dilatation of the aortic root.       CONCLUSIONS:   1. Left ventricular systolic function is normal with a 55% estimated ejection fraction.    QUANTITATIVE DATA SUMMARY:  2D MEASUREMENTS:                           Normal Ranges:  Ao Root d:     4.00 cm   (2.0-3.7cm)  LAs:           2.40 cm   (2.7-4.0cm)  IVSd:          1.10 cm   (0.6-1.1cm)  LVPWd:         1.00 cm   (0.6-1.1cm)  LVIDd:         4.70 cm   (3.9-5.9cm)  LVIDs:         3.20 cm  LV Mass Index: 77.0 g/m2  LV % FS        31.9 %    LA VOLUME:                                Normal Ranges:  LA Vol A4C:        27.5 ml    (22+/-6mL/m2)  LA Vol A2C:        31.3 ml  LA Vol BP:         29.9 ml  LA Vol Index A4C:  12.1ml/m2  LA Vol Index A2C:  13.7 ml/m2  LA Vol Index BP:   13.1 ml/m2  LA Area A4C:       12.6 cm2  LA Area A2C:       13.7 cm2  LA Major Axis A4C: 4.9 cm  LA Major Axis A2C: 5.1 cm  LA Volume Index:   13.1 ml/m2    RA VOLUME BY A/L METHOD:                        Normal Ranges:  RA Area A4C: 17.0 cm2    AORTA MEASUREMENTS:                       Normal Ranges:  Ao Sinus, d: 4.00 cm (2.1-3.5cm)  Ao STJ, d:   3.60 cm (1.7-3.4cm)  Asc Ao, d:   4.00 cm (2.1-3.4cm)    LV SYSTOLIC FUNCTION BY 2D PLANIMETRY (MOD):                      Normal Ranges:  EF-A4C View: 56.0 % (>=55%)  EF-A2C View: 66.3 %  EF-Biplane:  62.5  %    LV DIASTOLIC FUNCTION:                         Normal Ranges:  MV Peak E:    0.46 m/s (0.7-1.2 m/s)  MV Peak A:    0.60 m/s (0.42-0.7 m/s)  E/A Ratio:    0.76     (1.0-2.2)  MV e'         0.07 m/s (>8.0)  MV lateral e' 0.08 m/s  MV medial e'  0.06 m/s  E/e' Ratio:   6.56     (<8.0)    AORTIC VALVE:                                    Normal Ranges:  AoV Vmax:                1.17 m/s (<=1.7m/s)  AoV Peak P.5 mmHg (<20mmHg)  AoV Mean PG:             3.0 mmHg (1.7-11.5mmHg)  LVOT Max Som:            0.76 m/s (<=1.1m/s)  AoV VTI:                 23.00 cm (18-25cm)  LVOT VTI:                17.50 cm  LVOT Diameter:           2.50 cm  (1.8-2.4cm)  AoV Area, VTI:           3.73 cm2 (2.5-5.5cm2)  AoV Area,Vmax:           3.18 cm2 (2.5-4.5cm2)  AoV Dimensionless Index: 0.76       RIGHT VENTRICLE:  RV Basal 3.60 cm  RV Mid   2.50 cm  RV Major 7.2 cm  TAPSE:   25.9 mm  RV s'    0.15 m/s    TRICUSPID VALVE/RVSP:                              Normal Ranges:  Peak TR Velocity: 1.86 m/s  RV Syst Pressure: 16.8 mmHg (< 30mmHg)  IVC Diam:         1.20 cm       [id number] Onur Cee MD  Electronically signed on 2024 at 4:23:06 PM       ** Final **  ECG 12 lead  Sinus rhythm  Atrial premature complex  Short HI interval  Consider right atrial enlargement  Inferior infarct, old  Prolonged QT interval  Lower extremity venous duplex bilateral  Narrative: Interpreted By:  Franki Cardozo,   STUDY:  [facility] LOWER EXTREMITY VENOUS DUPLEX BILATERAL; 2024 7:34 am      INDICATION:  PE, DVT?      COMPARISON:  None      ACCESSION NUMBER(S):  [id number]      ORDERING CLINICIAN:  GELA CRAIG      TECHNIQUE:  Static grayscale, color Doppler, and spectral Doppler sonographic  images of the bilateral lower extremities were obtained for duplex  evaluation.      FINDINGS:  LEFT LOWER EXTREMITY:  There is no evidence of deep venous thrombus in the visualized  portions of the common femoral vein, femoral vein, or popliteal  vein.  Respiratory variation and augmentation to calf pressure is noted. The  visualized portion of the posterior tibial and peroneal veins are  unremarkable.      RIGHT LOWER EXTREMITY:  Occlusive thrombus extending from the common femoral vein through the  popliteal vein. Suboptimal calf vein evaluation.      Impression: 1. Occlusive thrombus from the femoral vein through the popliteal  vein on the right.      Signed by: Franki Cardozo 6/5/2024 7:39 AM  Dictation workstation:   [id number]  CT chest abdomen pelvis w IV contrast  Narrative: Interpreted By:  Lane Ayala,   STUDY:  CT CHEST ABDOMEN PELVIS W IV CONTRAST;  6/5/2024 12:11 am      INDICATION:  Signs/Symptoms:falls, left rib/upper abd pain.      COMPARISON:  06/27/2022      ACCESSION NUMBER(S):  [id number]      ORDERING CLINICIAN:  COTY OREILLY      TECHNIQUE:  Contiguous axial images of the chest, abdomen, and pelvis were  obtained after the intravenous administration of iodinated contrast.  Coronal and sagittal reformatted images were reconstructed from the  axial data.      FINDINGS:  CT CHEST:      MEDIASTINUM AND LYMPH NODES:  The esophageal wall appears within  normal limits.  No enlarged intrathoracic or axillary lymph nodes by  imaging criteria. No pneumomediastinum.      VESSELS: There is an acute saddle pulmonary embolus that extends into  the bilateral lower lobe pulmonary arteries and their segmental and  subsegmental branches. There is a pulmonary embolism within the right  upper lobar pulmonary artery. The main pulmonary artery is borderline  dilated at 3 cm. The aorta is normal in caliber without evidence of  acute traumatic injury. There is mild aortic atherosclerosis.      HEART: There is no evidence of right heart strain. The RV-LV ratio is  < 1.  Moderate to severe coronary artery calcifications. No  significant pericardial effusion.      LUNG, AIRWAYS, PLEURA: There is focal subpleural consolidative  opacity involving the lateral  segment of the left lower lobe with  surrounding ground-glass density consistent with a pulmonary  infarction. There is additional area of probable infarct with mild  atelectasis in the lingula. The right lung is clear. No pneumothorax.  There is a left upper lobe calcified granuloma.      CHEST WALL SOFT TISSUES: No discernible acute abnormality.      OSSEOUS STRUCTURES: No acute osseous abnormality. Partially  visualized internal fixation hardware in the left proximal humerus.  There redemonstration of chronic nondisplaced fractures of the  lateral left 8th and 9th ribs.          CT ABDOMEN/PELVIS:      ABDOMINAL WALL: No significant abnormality.      LIVER: No acute parenchymal injury or suspicious lesion. Diffuse  hypoattenuation of the liver consistent with steatosis.      BILE DUCTS: No significant intrahepatic or extrahepatic dilatation.      GALLBLADDER: Cholelithiasis. No discernible gallbladder wall  thickening, pericholecystic fluid, or stranding.      PANCREAS: No significant abnormality.      SPLEEN: No significant abnormality.      ADRENALS: No significant abnormality.      KIDNEYS, URETERS, BLADDER:  No significant abnormality.      REPRODUCTIVE ORGANS: No significant abnormality.      VESSELS: Moderate aortic atherosclerosis without AAA.      RETROPERITONEUM/LYMPH NODES: No enlarged lymph nodes. No acute  retroperitoneal abnormality.      BOWEL/MESENTERY/PERITONEUM: There is colonic diverticulosis without  evidence for diverticulitis.  No inflammatory bowel wall thickening  or dilatation. Normal appendix.      No ascites, free air, or fluid collection.          MUSCULOSKELETAL: No acute osseous abnormality.      Impression: Acute saddle pulmonary embolus with large clot burden or extending  into the bilateral lower lobar pulmonary arteries and their segmental  and subsegmental branches, as well as into the right upper lobar  pulmonary artery. No evidence of right heart strain.      Pulmonary  parenchymal infarct in the left lower lobe and lingula.      No acute left rib fractures or other acute traumatic injury in the  chest, abdomen, or pelvis.      Colonic diverticulosis, hepatic steatosis, cholelithiasis.      MACRO:  Lane Ayala discussed the significance and urgency of this  critical finding by ERIK PABLO with  COTY OREILLY on 6/5/2024 at 12:33  am.  (**-RCF-**) Findings:  See findings.      Signed by: Lane Ayala 6/5/2024 12:46 AM  Dictation workstation:   [id number]  CT head wo IV contrast  Narrative: Interpreted By:  Lane Ayala,   STUDY:  CT HEAD WO IV CONTRAST;  6/5/2024 12:11 am      INDICATION:  Signs/Symptoms:fall.      COMPARISON:  04/17/2023      ACCESSION NUMBER(S):  [id number]      ORDERING CLINICIAN:  COTY OREILLY      TECHNIQUE:  Noncontrast axial CT images of head were obtained with coronal and  sagittal reconstructed images.      FINDINGS:  BRAIN PARENCHYMA: There is extensive chronic small vessel ischemic  disease including chronic lacunar infarcts in the right corona  radiata and basal ganglia, subinsular white matter, right thalamus,  sarah, and diffusely throughout the periventricular white matter. No  acute intraparenchymal hemorrhage or parenchymal evidence of acute  large territory ischemic infarct. No mass-effect.      VENTRICLES and EXTRA-AXIAL SPACES:  No acute extra-axial or  intraventricular hemorrhage. No effacement of cerebral sulci.  Ventricles and sulci are age-concordant.      PARANASAL SINUSES/MASTOIDS:  No hemorrhage or air-fluid levels within  the visualized paranasal sinuses. The mastoids are well aerated.      CALVARIUM/ORBITS:  No skull fracture.  The orbits and globes are  intact to the extent visualized.      EXTRACRANIAL SOFT TISSUES: No discernible abnormality.      Impression: 1. No acute intracranial abnormality.      2. Stable, severe burden of chronic small vessel ischemic disease,  primarily in the right basal ganglia.      MACRO:  None.       Signed by: Lane Ayala 6/5/2024 12:30 AM  Dictation workstation:   [id number]  XR knee left 1-2 views  Narrative: Interpreted By:  Lane Ayala,   STUDY:  XR KNEE LEFT 1-2 VIEWS; ;  6/4/2024 11:24 pm      INDICATION:  Signs/Symptoms:fall.      COMPARISON:  04/17/2023      ACCESSION NUMBER(S):  [id number]      ORDERING CLINICIAN:  COTY OREILLY      FINDINGS:  There is redemonstration of severe joint space loss in the lateral  compartment and moderate degenerative change of the medial and  patellofemoral compartments. There is a small joint effusion. There  is no acute fracture or malalignment.      Impression: No acute osseous abnormality of the left knee.      Tricompartmental osteoarthrosis severely affecting the lateral  compartment with small joint effusion.      MACRO:  None.      Signed by: Lane Ayala 6/5/2024 12:27 AM  Dictation workstation:   [id number]      CONSTITUTIONAL - alert, in no acute distress, not ill-appearing, frail, male, shaky  CHEST -decreased breath sounds due to shallow breaths likely due to pleuritic pain  CARDIAC - regular rate and regular rhythm, no murmur  ABDOMEN - no organomegaly, soft, nontender  EXTREMITIES - no edema, no deformities  NEUROLOGICAL - alert, oriented x3 and no acute focal signs, very shaky  PSYCHIATRIC - alert, slightly anxious      Relevant Results  Scheduled medications  apixaban, 10 mg, oral, BID   Followed by  [START ON 6/12/2024] apixaban, 5 mg, oral, BID  folic acid, 1 mg, oral, Daily  lidocaine, 1 patch, transdermal, q24h  multivitamin with minerals, 1 tablet, oral, Daily  nicotine, 1 patch, transdermal, q24h   Followed by  [START ON 7/17/2024] nicotine, 1 patch, transdermal, q24h   Followed by  [START ON 7/31/2024] nicotine, 1 patch, transdermal, q24h  oxygen, , inhalation, Continuous - Inhalation  [START ON 6/8/2024] thiamine, 100 mg, oral, Daily  thiamine, 100 mg, intravenous, Daily      Continuous medications     PRN medications  PRN  medications: acetaminophen, ipratropium-albuteroL, LORazepam **OR** LORazepam **OR** LORazepam     Results for orders placed or performed during the hospital encounter of 06/04/24 (from the past 24 hour(s))   Basic Metabolic Panel   Result Value Ref Range    Glucose 157 (H) 74 - 99 mg/dL    Sodium 136 136 - 145 mmol/L    Potassium 3.3 (L) 3.5 - 5.3 mmol/L    Chloride 101 98 - 107 mmol/L    Bicarbonate 27 21 - 32 mmol/L    Anion Gap 11 10 - 20 mmol/L    Urea Nitrogen 9 6 - 23 mg/dL    Creatinine 0.52 0.50 - 1.30 mg/dL    eGFR >90 >60 mL/min/1.73m*2    Calcium 8.3 (L) 8.6 - 10.3 mg/dL   Magnesium   Result Value Ref Range    Magnesium 1.31 (L) 1.60 - 2.40 mg/dL   CBC   Result Value Ref Range    WBC 5.3 4.4 - 11.3 x10*3/uL    nRBC 0.0 0.0 - 0.0 /100 WBCs    RBC 4.33 (L) 4.50 - 5.90 x10*6/uL    Hemoglobin 13.9 13.5 - 17.5 g/dL    Hematocrit 39.4 (L) 41.0 - 52.0 %    MCV 91 80 - 100 fL    MCH 32.1 26.0 - 34.0 pg    MCHC 35.3 32.0 - 36.0 g/dL    RDW 14.0 11.5 - 14.5 %    Platelets 120 (L) 150 - 450 x10*3/uL                  ASSESSMENT AND PLAN:  Assessment/Plan   This patient currently has cardiac telemetry ordered; if you would like to modify or discontinue the telemetry order, click here to go to the orders activity to modify/discontinue the order.              Principal Problem:    Multiple pulmonary emboli (Multi)    Extensive Bilateral Saddle Pulmonary Emboli   Pulmonary Parenchymal Infarct (left lower lobe/lingula)  -CTA Chest as noted above without signs of right heart strain  -Heparin gtt   -TTE pending  -will need to transition to oral agents accordingly  -no signs of right heart strain and remains hemodynamically stable currently  -will need to discuss further with PERT team if thrombectomy is warranted give the extensive clot burden  -unclear inciting event but suspect repeat falls and limited mobility   -Lower Extremity Venous Duplex = pending  -Lidocaine Patch and analgesics  -Incentive Spirometer   -Telemetry  Monitoring   6/5 PERT team consult appreciated- patient not a candidate for thrombectomy, Echo Pending, starting pt on Eliquis and will monitor  6/6: Started on Eliquis.  DVT and PE likely due to very sedentary lifestyle       Alcohol dependency  with Alcohol Withdrawal Syndrome  History of Alcohol Withdrawal Syndrome/Delirium Tremens with withdrawal seizures  -Alcohol Level <10  -UDS pending  -CIWA protocol with PO Ativan  -MVI/Folate/Thiamine  6/6: Continue to monitor for alcohol withdrawal.  Very shaky.     Thrombocytopenia secondary to alcohol abuse  -Plt Ct = 149 (04/25/24) --> 119 -->  -known history of thrombocytopenia averaging around the 150s - 180s at baseline (possibly in setting of alcohol use disorder)  -no over signs of bleeding or abnormal bleeding currently   -monitor closely especially in setting of need for heparin and blood thinners in general for extensive pulmonary emboli     Traumatic rhabdomyolysis  -elevated CK possibly in setting of above vs being of limited mobility as of late?  -suspect also why he has become even weaker and with limited ROM/Strength particularly in the lower extremities   -Urinalysis = pending  -CK = 1911 -->1247 -->  -will start of IVFs but monitor fluid status closely in setting of extensive pulmonary emboli regardless of if heart strain is not present at this time     Hypomagnesemia  Hypokalemia  Hyponatremia  Severe protein calorie malnutrition clinically  -likely poor intake  -replace accordingly  -Sodium = 132 --> 134 -->   -Potassium = 3.3 --> 2.9 -->   -Magnesium = 1.47 -  6/6: Continue to replete electrolytes.    Acute on Chronic Deconditioning   Ambulatory Dysfunction  Inability to Care for Self  -PT/OT appreciated  -SW/CM appreciated                 Edmundo Tejeda MD

## 2024-06-08 LAB
ANION GAP SERPL CALC-SCNC: 12 MMOL/L (ref 10–20)
BUN SERPL-MCNC: 12 MG/DL (ref 6–23)
CALCIUM SERPL-MCNC: 8.7 MG/DL (ref 8.6–10.3)
CHLORIDE SERPL-SCNC: 100 MMOL/L (ref 98–107)
CO2 SERPL-SCNC: 26 MMOL/L (ref 21–32)
CREAT SERPL-MCNC: 0.38 MG/DL (ref 0.5–1.3)
EGFRCR SERPLBLD CKD-EPI 2021: >90 ML/MIN/1.73M*2
GLUCOSE SERPL-MCNC: 195 MG/DL (ref 74–99)
MAGNESIUM SERPL-MCNC: 1.29 MG/DL (ref 1.6–2.4)
POTASSIUM SERPL-SCNC: 3.6 MMOL/L (ref 3.5–5.3)
SODIUM SERPL-SCNC: 134 MMOL/L (ref 136–145)

## 2024-06-08 PROCEDURE — 2500000005 HC RX 250 GENERAL PHARMACY W/O HCPCS: Performed by: INTERNAL MEDICINE

## 2024-06-08 PROCEDURE — S4991 NICOTINE PATCH NONLEGEND: HCPCS | Performed by: INTERNAL MEDICINE

## 2024-06-08 PROCEDURE — 2500000001 HC RX 250 WO HCPCS SELF ADMINISTERED DRUGS (ALT 637 FOR MEDICARE OP): Performed by: INTERNAL MEDICINE

## 2024-06-08 PROCEDURE — 83735 ASSAY OF MAGNESIUM: CPT | Performed by: INTERNAL MEDICINE

## 2024-06-08 PROCEDURE — 99233 SBSQ HOSP IP/OBS HIGH 50: CPT | Performed by: INTERNAL MEDICINE

## 2024-06-08 PROCEDURE — 2500000002 HC RX 250 W HCPCS SELF ADMINISTERED DRUGS (ALT 637 FOR MEDICARE OP, ALT 636 FOR OP/ED): Performed by: INTERNAL MEDICINE

## 2024-06-08 PROCEDURE — 2500000004 HC RX 250 GENERAL PHARMACY W/ HCPCS (ALT 636 FOR OP/ED): Performed by: INTERNAL MEDICINE

## 2024-06-08 PROCEDURE — 36415 COLL VENOUS BLD VENIPUNCTURE: CPT | Performed by: INTERNAL MEDICINE

## 2024-06-08 PROCEDURE — 80048 BASIC METABOLIC PNL TOTAL CA: CPT | Performed by: INTERNAL MEDICINE

## 2024-06-08 PROCEDURE — 1210000001 HC SEMI-PRIVATE ROOM DAILY

## 2024-06-08 RX ORDER — MAGNESIUM SULFATE HEPTAHYDRATE 40 MG/ML
4 INJECTION, SOLUTION INTRAVENOUS ONCE
Status: COMPLETED | OUTPATIENT
Start: 2024-06-08 | End: 2024-06-09

## 2024-06-08 RX ADMIN — OXYCODONE HYDROCHLORIDE 10 MG: 10 TABLET ORAL at 09:21

## 2024-06-08 RX ADMIN — NICOTINE 1 PATCH: 21 PATCH, EXTENDED RELEASE TRANSDERMAL at 00:50

## 2024-06-08 RX ADMIN — APIXABAN 10 MG: 5 TABLET, FILM COATED ORAL at 09:13

## 2024-06-08 RX ADMIN — THIAMINE HCL TAB 100 MG 100 MG: 100 TAB at 09:13

## 2024-06-08 RX ADMIN — Medication 1 L/MIN: at 08:00

## 2024-06-08 RX ADMIN — SENNOSIDES AND DOCUSATE SODIUM 2 TABLET: 50; 8.6 TABLET ORAL at 09:13

## 2024-06-08 RX ADMIN — APIXABAN 10 MG: 5 TABLET, FILM COATED ORAL at 20:15

## 2024-06-08 RX ADMIN — LIDOCAINE 4% 1 PATCH: 40 PATCH TOPICAL at 04:32

## 2024-06-08 RX ADMIN — POLYETHYLENE GLYCOL 3350 17 G: 17 POWDER, FOR SOLUTION ORAL at 09:13

## 2024-06-08 RX ADMIN — Medication 1 TABLET: at 09:13

## 2024-06-08 RX ADMIN — FOLIC ACID 1 MG: 1 TABLET ORAL at 09:13

## 2024-06-08 RX ADMIN — MAGNESIUM SULFATE HEPTAHYDRATE 4 G: 40 INJECTION, SOLUTION INTRAVENOUS at 20:15

## 2024-06-08 ASSESSMENT — PAIN SCALES - GENERAL
PAINLEVEL_OUTOF10: 10 - WORST POSSIBLE PAIN
PAINLEVEL_OUTOF10: 0 - NO PAIN

## 2024-06-08 ASSESSMENT — PAIN - FUNCTIONAL ASSESSMENT: PAIN_FUNCTIONAL_ASSESSMENT: 0-10

## 2024-06-08 NOTE — PROGRESS NOTES
PROGRESS NOTE    Teddy García is a 63 y.o. male on day 3 of admission presenting with Multiple pulmonary emboli (Multi).    SUBJECTIVE:    History Of Present Illness:  Teddy García is a 63 y.o.  male with a past medical history significant for EtOH abuse with h/o withdrawal seizures / DTs, tobacco use disorder, history of Left Toe Ulcer/Infection in 2022 and 2023, anxiety and depression. He does have a history of ambulatory dysfunction and states he uses a rollator at baseline. He states he smokes and drinks daily but unfortunately would not characterize exactly how much. He states he fell onto his left knee a few days ago and it has been hurting. Overall he feels unwell for over a week he said maybe. He states he may have fallen a week ago but he didn't really remember.  He had also noted some pain in his left lower rib particularly when he takes a deep breath then which she thought was due to him pulling a muscle versus his recurrent falls over the past several days.  Denies initiation of any new medications.  He denied any recent sick contacts, chemical/environmental exposures, changes in dietary habits or any recent traumatic events/falls other than noted above.  He denies any fevers, chills, night sweats, vision changes, auditory changes, change in taste/smell, loss of bowel/bladder control, loss consciousness, dizziness, vertigo, syncope, seizure-like activity, chest pain, palpitations, shortness of breath, coughing, wheezing, congestion, hemoptysis, hematemesis, abdominal pain, nausea, vomiting, diarrhea, constipation, dysuria, hematuria, dyschezia, hematochezia or any lateralizing motor/sensory deficits other than noted above.     Patient stated that over the past 2 days maybe longer he really has not been able to get up out of his couch other than to really go to the bathroom and at times has had significant difficulties getting up and off of the toilet and has spent several hours laying on  his couch or sitting on the toilet.  Neighbors had called EMS for a well check and they had noted that the patient was quite unkempt as well as his house and a bit of disarray as well.  He states that he has had very poor p.o. intake over the past several days as well due to significant weakness and inability to get some food and has not had anything to eat since yesterday morning.    6/5 Spoke with the patient today and he appeared tired, but alert and oriented. He reported shortness of breath, and chest pain during inspiration, particularly on the left side of chest. He denies any palpitations, lower extremity pain or heaviness.   Overnight events: Patient desaturated to mid 80s and placed on 4 L Oxygen. Positive Lower extremity venous duplex scan.  6/6: No acute events overnight.  Patient is alert and oriented.  He is still very frail.  He has significant pleuritic chest pain.  He is very shaky.  Started on Eliquis.  PT and OT recommendation appreciated.  Continue alcohol withdrawal protocol.  Patient will require skilled nursing facility on discharge.  6/7: No acute events overnight.  Patient states that he has pain all over.  He does have some pleuritic chest pain he still feels weak although overall improving.  He still shaky.  Denies any abdominal pain, nausea, vomiting.  He has no bleeding from any site continue Eliquis.  Awaiting prior authorization to skilled nursing facility.  Continue to treat for alcohol withdrawal.  6/8: No acute events overnight.  Patient much more relaxed.  His pains are improving.  He feels stronger.  He denies any focal weakness or numbness.  Denies any shortness of breath, chest pain, palpitation lightheadedness.  We are currently awaiting prior authorization.  Continue alcohol withdrawal protocol.    OBJECTIVE:  Objective     Last Recorded Vitals  /83 (BP Location: Left arm, Patient Position: Lying)   Pulse 74   Temp 37.4 °C (99.4 °F) (Temporal)   Resp 16   Wt 92.5 kg  (203 lb 14.8 oz)   SpO2 95%     Intake/Output last 3 Shifts:    Intake/Output Summary (Last 24 hours) at 6/8/2024 1827  Last data filed at 6/8/2024 1707  Gross per 24 hour   Intake 1222 ml   Output 2350 ml   Net -1128 ml       Admission Weight  Weight: 97.5 kg (215 lb) (06/04/24 2038)    Daily Weight  06/07/24 : 92.5 kg (203 lb 14.8 oz)      Image Results  Electrocardiogram, 12-lead PRN ACS symptoms  Sinus rhythm with Premature atrial complexes  Otherwise normal ECG  When compared with ECG of 06-JUN-2024 21:28, (unconfirmed)  No significant change was found      CONSTITUTIONAL -frail elderly looking male, alert and oriented x 3  CHEST -still with decreased breath sounds bilaterally,  CARDIAC - regular rate and regular rhythm, no murmur  ABDOMEN - no organomegaly, soft, nontender  EXTREMITIES - no edema, no deformities  NEUROLOGICAL - alert, oriented x3 and no acute focal signs, less tremulous  PSYCHIATRIC - alert, relaxed      Relevant Results  Scheduled medications  apixaban, 10 mg, oral, BID   Followed by  [START ON 6/12/2024] apixaban, 5 mg, oral, BID  folic acid, 1 mg, oral, Daily  lidocaine, 1 patch, transdermal, q24h  multivitamin with minerals, 1 tablet, oral, Daily  nicotine, 1 patch, transdermal, q24h   Followed by  [START ON 7/17/2024] nicotine, 1 patch, transdermal, q24h   Followed by  [START ON 7/31/2024] nicotine, 1 patch, transdermal, q24h  oxygen, , inhalation, Continuous - Inhalation  polyethylene glycol, 17 g, oral, Daily  sennosides-docusate sodium, 2 tablet, oral, BID  thiamine, 100 mg, oral, Daily      Continuous medications     PRN medications  PRN medications: acetaminophen, cyclobenzaprine, ipratropium-albuteroL, LORazepam **OR** LORazepam **OR** LORazepam, oxyCODONE, oxyCODONE     Results for orders placed or performed during the hospital encounter of 06/04/24 (from the past 24 hour(s))   Basic Metabolic Panel   Result Value Ref Range    Glucose 195 (H) 74 - 99 mg/dL    Sodium 134 (L) 136 -  145 mmol/L    Potassium 3.6 3.5 - 5.3 mmol/L    Chloride 100 98 - 107 mmol/L    Bicarbonate 26 21 - 32 mmol/L    Anion Gap 12 10 - 20 mmol/L    Urea Nitrogen 12 6 - 23 mg/dL    Creatinine 0.38 (L) 0.50 - 1.30 mg/dL    eGFR >90 >60 mL/min/1.73m*2    Calcium 8.7 8.6 - 10.3 mg/dL   Magnesium   Result Value Ref Range    Magnesium 1.29 (L) 1.60 - 2.40 mg/dL                  ASSESSMENT AND PLAN:  Assessment/Plan                  Principal Problem:    Multiple pulmonary emboli (Multi)    Extensive Bilateral Saddle Pulmonary Emboli   Pulmonary Parenchymal Infarct (left lower lobe/lingula)  -CTA Chest as noted above without signs of right heart strain  -Heparin gtt   -TTE pending  -will need to transition to oral agents accordingly  -no signs of right heart strain and remains hemodynamically stable currently  -will need to discuss further with PERT team if thrombectomy is warranted give the extensive clot burden  -unclear inciting event but suspect repeat falls and limited mobility   -Lower Extremity Venous Duplex = pending  -Lidocaine Patch and analgesics  -Incentive Spirometer   -Telemetry Monitoring   6/5 PERT team consult appreciated- patient not a candidate for thrombectomy, Echo Pending, starting pt on Eliquis and will monitor  6/6: Started on Eliquis.  DVT and PE likely due to very sedentary lifestyle  6/7: Continue Eliquis.  Continue PT and OT.  Awaiting authorization to skilled nursing facility.  6/8: Continue current treatment.  Awaiting prior authorization to skilled nursing facility.     Alcohol dependency  with Alcohol Withdrawal Syndrome  History of Alcohol Withdrawal Syndrome/Delirium Tremens with withdrawal seizures  -Alcohol Level <10  -UDS pending  -CIWA protocol with PO Ativan  -MVI/Folate/Thiamine  6/6: Continue to monitor for alcohol withdrawal.  Very shaky.  6/7: Continue alcohol withdrawal treatment.  6/8: Less tremulous today.  Continue CIWA.     Thrombocytopenia secondary to alcohol abuse  -Plt Ct =  149 (04/25/24) --> 119 -->  -known history of thrombocytopenia averaging around the 150s - 180s at baseline (possibly in setting of alcohol use disorder)  -no over signs of bleeding or abnormal bleeding currently   -monitor closely especially in setting of need for heparin and blood thinners in general for extensive pulmonary emboli     Traumatic rhabdomyolysis  -elevated CK possibly in setting of above vs being of limited mobility as of late?  -suspect also why he has become even weaker and with limited ROM/Strength particularly in the lower extremities   -Urinalysis = pending  -CK = 1911 -->1247 -->  -will start of IVFs but monitor fluid status closely in setting of extensive pulmonary emboli regardless of if heart strain is not present at this time     Hypomagnesemia  Hypokalemia  Hyponatremia  Severe protein calorie malnutrition clinically  -likely poor intake  -replace accordingly  -Sodium = 132 --> 134 -->   -Potassium = 3.3 --> 2.9 -->   -Magnesium = 1.47 -  6/6: Continue to replete electrolytes.  6/8: Magnesium low at 1.29.  Will give 4 g of IV magnesium    Acute on Chronic Deconditioning   Ambulatory Dysfunction  Inability to Care for Self  -PT/OT appreciated  -SW/CM appreciated                 Edmundo Tejeda MD

## 2024-06-08 NOTE — PROGRESS NOTES
PROGRESS NOTE    Teddy García is a 63 y.o. male on day 2 of admission presenting with Multiple pulmonary emboli (Multi).    SUBJECTIVE:    History Of Present Illness:  Teddy García is a 63 y.o.  male with a past medical history significant for EtOH abuse with h/o withdrawal seizures / DTs, tobacco use disorder, history of Left Toe Ulcer/Infection in 2022 and 2023, anxiety and depression. He does have a history of ambulatory dysfunction and states he uses a rollator at baseline. He states he smokes and drinks daily but unfortunately would not characterize exactly how much. He states he fell onto his left knee a few days ago and it has been hurting. Overall he feels unwell for over a week he said maybe. He states he may have fallen a week ago but he didn't really remember.  He had also noted some pain in his left lower rib particularly when he takes a deep breath then which she thought was due to him pulling a muscle versus his recurrent falls over the past several days.  Denies initiation of any new medications.  He denied any recent sick contacts, chemical/environmental exposures, changes in dietary habits or any recent traumatic events/falls other than noted above.  He denies any fevers, chills, night sweats, vision changes, auditory changes, change in taste/smell, loss of bowel/bladder control, loss consciousness, dizziness, vertigo, syncope, seizure-like activity, chest pain, palpitations, shortness of breath, coughing, wheezing, congestion, hemoptysis, hematemesis, abdominal pain, nausea, vomiting, diarrhea, constipation, dysuria, hematuria, dyschezia, hematochezia or any lateralizing motor/sensory deficits other than noted above.     Patient stated that over the past 2 days maybe longer he really has not been able to get up out of his couch other than to really go to the bathroom and at times has had significant difficulties getting up and off of the toilet and has spent several hours laying on  his couch or sitting on the toilet.  Neighbors had called EMS for a well check and they had noted that the patient was quite unkempt as well as his house and a bit of disarray as well.  He states that he has had very poor p.o. intake over the past several days as well due to significant weakness and inability to get some food and has not had anything to eat since yesterday morning.    6/5 Spoke with the patient today and he appeared tired, but alert and oriented. He reported shortness of breath, and chest pain during inspiration, particularly on the left side of chest. He denies any palpitations, lower extremity pain or heaviness.   Overnight events: Patient desaturated to mid 80s and placed on 4 L Oxygen. Positive Lower extremity venous duplex scan.  6/6: No acute events overnight.  Patient is alert and oriented.  He is still very frail.  He has significant pleuritic chest pain.  He is very shaky.  Started on Eliquis.  PT and OT recommendation appreciated.  Continue alcohol withdrawal protocol.  Patient will require skilled nursing facility on discharge.  6/7: No acute events overnight.  Patient states that he has pain all over.  He does have some pleuritic chest pain he still feels weak although overall improving.  He still shaky.  Denies any abdominal pain, nausea, vomiting.  He has no bleeding from any site continue Eliquis.  Awaiting prior authorization to skilled nursing facility.  Continue to treat for alcohol withdrawal.    OBJECTIVE:  Objective     Last Recorded Vitals  /77 (BP Location: Right arm, Patient Position: Lying)   Pulse 77   Temp 37.3 °C (99.1 °F) (Temporal)   Resp 17   Wt 92.5 kg (203 lb 14.8 oz)   SpO2 93%     Intake/Output last 3 Shifts:    Intake/Output Summary (Last 24 hours) at 6/7/2024 2112  Last data filed at 6/7/2024 1510  Gross per 24 hour   Intake --   Output 1700 ml   Net -1700 ml       Admission Weight  Weight: 97.5 kg (215 lb) (06/04/24 2038)    Daily Weight  06/07/24 :  92.5 kg (203 lb 14.8 oz)      Image Results  Electrocardiogram, 12-lead PRN ACS symptoms  Sinus rhythm with Premature atrial complexes  Otherwise normal ECG  When compared with ECG of 06-JUN-2024 21:28, (unconfirmed)  No significant change was found      CONSTITUTIONAL -frail elderly looking male, alert and oriented x 3, much more alert, oriented x 3  CHEST -still with decreased breath sounds bilaterally, due to shallow breaths due to generalized pains.  CARDIAC - regular rate and regular rhythm, no murmur  ABDOMEN - no organomegaly, soft, nontender  EXTREMITIES - no edema, no deformities  NEUROLOGICAL - alert, oriented x3 and no acute focal signs, still tremulous  PSYCHIATRIC - alert, less anxious today      Relevant Results  Scheduled medications  apixaban, 10 mg, oral, BID   Followed by  [START ON 6/12/2024] apixaban, 5 mg, oral, BID  folic acid, 1 mg, oral, Daily  lidocaine, 1 patch, transdermal, q24h  multivitamin with minerals, 1 tablet, oral, Daily  nicotine, 1 patch, transdermal, q24h   Followed by  [START ON 7/17/2024] nicotine, 1 patch, transdermal, q24h   Followed by  [START ON 7/31/2024] nicotine, 1 patch, transdermal, q24h  oxygen, , inhalation, Continuous - Inhalation  polyethylene glycol, 17 g, oral, Daily  sennosides-docusate sodium, 2 tablet, oral, BID  [START ON 6/8/2024] thiamine, 100 mg, oral, Daily      Continuous medications     PRN medications  PRN medications: acetaminophen, cyclobenzaprine, ipratropium-albuteroL, LORazepam **OR** LORazepam **OR** LORazepam, oxyCODONE, oxyCODONE     Results for orders placed or performed during the hospital encounter of 06/04/24 (from the past 24 hour(s))   Electrocardiogram, 12-lead PRN ACS symptoms   Result Value Ref Range    Ventricular Rate 78 BPM    Atrial Rate 78 BPM    MT Interval 170 ms    QRS Duration 98 ms    QT Interval 416 ms    QTC Calculation(Bazett) 474 ms    P Axis 61 degrees    R Axis 52 degrees    T Axis 57 degrees    QRS Count 13 beats    Q  Onset 220 ms    P Onset 135 ms    P Offset 196 ms    T Offset 428 ms    QTC Fredericia 454 ms   Comprehensive Metabolic Panel   Result Value Ref Range    Glucose 211 (H) 74 - 99 mg/dL    Sodium 135 (L) 136 - 145 mmol/L    Potassium 3.3 (L) 3.5 - 5.3 mmol/L    Chloride 101 98 - 107 mmol/L    Bicarbonate 28 21 - 32 mmol/L    Anion Gap 9 (L) 10 - 20 mmol/L    Urea Nitrogen 12 6 - 23 mg/dL    Creatinine 0.50 0.50 - 1.30 mg/dL    eGFR >90 >60 mL/min/1.73m*2    Calcium 8.0 (L) 8.6 - 10.3 mg/dL    Albumin 3.0 (L) 3.4 - 5.0 g/dL    Alkaline Phosphatase 73 33 - 136 U/L    Total Protein 6.0 (L) 6.4 - 8.2 g/dL    AST 54 (H) 9 - 39 U/L    Bilirubin, Total 0.5 0.0 - 1.2 mg/dL    ALT 81 (H) 10 - 52 U/L   CBC and Auto Differential   Result Value Ref Range    WBC 5.1 4.4 - 11.3 x10*3/uL    nRBC 0.0 0.0 - 0.0 /100 WBCs    RBC 4.05 (L) 4.50 - 5.90 x10*6/uL    Hemoglobin 13.1 (L) 13.5 - 17.5 g/dL    Hematocrit 37.1 (L) 41.0 - 52.0 %    MCV 92 80 - 100 fL    MCH 32.3 26.0 - 34.0 pg    MCHC 35.3 32.0 - 36.0 g/dL    RDW 14.0 11.5 - 14.5 %    Platelets 146 (L) 150 - 450 x10*3/uL    Neutrophils % 61.9 40.0 - 80.0 %    Immature Granulocytes %, Automated 0.4 0.0 - 0.9 %    Lymphocytes % 24.0 13.0 - 44.0 %    Monocytes % 10.0 2.0 - 10.0 %    Eosinophils % 3.3 0.0 - 6.0 %    Basophils % 0.4 0.0 - 2.0 %    Neutrophils Absolute 3.15 1.20 - 7.70 x10*3/uL    Immature Granulocytes Absolute, Automated 0.02 0.00 - 0.70 x10*3/uL    Lymphocytes Absolute 1.22 1.20 - 4.80 x10*3/uL    Monocytes Absolute 0.51 0.10 - 1.00 x10*3/uL    Eosinophils Absolute 0.17 0.00 - 0.70 x10*3/uL    Basophils Absolute 0.02 0.00 - 0.10 x10*3/uL   Magnesium   Result Value Ref Range    Magnesium 1.60 1.60 - 2.40 mg/dL   Protime-INR   Result Value Ref Range    Protime 15.0 (H) 9.8 - 12.8 seconds    INR 1.3 (H) 0.9 - 1.1                  ASSESSMENT AND PLAN:  Assessment/Plan   This patient currently has cardiac telemetry ordered; if you would like to modify or discontinue the  telemetry order, click here to go to the orders activity to modify/discontinue the order.              Principal Problem:    Multiple pulmonary emboli (Multi)    Extensive Bilateral Saddle Pulmonary Emboli   Pulmonary Parenchymal Infarct (left lower lobe/lingula)  -CTA Chest as noted above without signs of right heart strain  -Heparin gtt   -TTE pending  -will need to transition to oral agents accordingly  -no signs of right heart strain and remains hemodynamically stable currently  -will need to discuss further with PERT team if thrombectomy is warranted give the extensive clot burden  -unclear inciting event but suspect repeat falls and limited mobility   -Lower Extremity Venous Duplex = pending  -Lidocaine Patch and analgesics  -Incentive Spirometer   -Telemetry Monitoring   6/5 PERT team consult appreciated- patient not a candidate for thrombectomy, Echo Pending, starting pt on Eliquis and will monitor  6/6: Started on Eliquis.  DVT and PE likely due to very sedentary lifestyle  6/7: Continue Eliquis.  Continue PT and OT.  Awaiting authorization to skilled nursing facility.       Alcohol dependency  with Alcohol Withdrawal Syndrome  History of Alcohol Withdrawal Syndrome/Delirium Tremens with withdrawal seizures  -Alcohol Level <10  -UDS pending  -CIWA protocol with PO Ativan  -MVI/Folate/Thiamine  6/6: Continue to monitor for alcohol withdrawal.  Very shaky.  6/7: Continue alcohol withdrawal treatment.     Thrombocytopenia secondary to alcohol abuse  -Plt Ct = 149 (04/25/24) --> 119 -->  -known history of thrombocytopenia averaging around the 150s - 180s at baseline (possibly in setting of alcohol use disorder)  -no over signs of bleeding or abnormal bleeding currently   -monitor closely especially in setting of need for heparin and blood thinners in general for extensive pulmonary emboli     Traumatic rhabdomyolysis  -elevated CK possibly in setting of above vs being of limited mobility as of late?  -suspect  also why he has become even weaker and with limited ROM/Strength particularly in the lower extremities   -Urinalysis = pending  -CK = 1911 -->1247 -->  -will start of IVFs but monitor fluid status closely in setting of extensive pulmonary emboli regardless of if heart strain is not present at this time     Hypomagnesemia  Hypokalemia  Hyponatremia  Severe protein calorie malnutrition clinically  -likely poor intake  -replace accordingly  -Sodium = 132 --> 134 -->   -Potassium = 3.3 --> 2.9 -->   -Magnesium = 1.47 -  6/6: Continue to replete electrolytes.    Acute on Chronic Deconditioning   Ambulatory Dysfunction  Inability to Care for Self  -PT/OT appreciated  -SW/CM appreciated                 Edmundo Tejeda MD

## 2024-06-08 NOTE — PROGRESS NOTES
PT transferred to 3325 via hospital bed on room air.  Pt alert/oriented , vital signs stable and denied pain/nausea at time of transfer.  Pt personal belongings bag placed on bed for transfer with patient.  Report given to YARON Barker.

## 2024-06-08 NOTE — CARE PLAN
The patient's goals for the shift include      The clinical goals for the shift include Pt will remain free from fall or injury this shift. Pt transferred to [facility].

## 2024-06-09 LAB
ANION GAP SERPL CALC-SCNC: 11 MMOL/L (ref 10–20)
BUN SERPL-MCNC: 15 MG/DL (ref 6–23)
CALCIUM SERPL-MCNC: 8.4 MG/DL (ref 8.6–10.3)
CHLORIDE SERPL-SCNC: 98 MMOL/L (ref 98–107)
CO2 SERPL-SCNC: 26 MMOL/L (ref 21–32)
CREAT SERPL-MCNC: 0.49 MG/DL (ref 0.5–1.3)
EGFRCR SERPLBLD CKD-EPI 2021: >90 ML/MIN/1.73M*2
GLUCOSE SERPL-MCNC: 215 MG/DL (ref 74–99)
MAGNESIUM SERPL-MCNC: 1.73 MG/DL (ref 1.6–2.4)
POTASSIUM SERPL-SCNC: 4 MMOL/L (ref 3.5–5.3)
SODIUM SERPL-SCNC: 131 MMOL/L (ref 136–145)

## 2024-06-09 PROCEDURE — 2500000002 HC RX 250 W HCPCS SELF ADMINISTERED DRUGS (ALT 637 FOR MEDICARE OP, ALT 636 FOR OP/ED): Performed by: INTERNAL MEDICINE

## 2024-06-09 PROCEDURE — 99232 SBSQ HOSP IP/OBS MODERATE 35: CPT | Performed by: INTERNAL MEDICINE

## 2024-06-09 PROCEDURE — 2500000001 HC RX 250 WO HCPCS SELF ADMINISTERED DRUGS (ALT 637 FOR MEDICARE OP): Performed by: INTERNAL MEDICINE

## 2024-06-09 PROCEDURE — 2500000005 HC RX 250 GENERAL PHARMACY W/O HCPCS: Performed by: INTERNAL MEDICINE

## 2024-06-09 PROCEDURE — 2500000004 HC RX 250 GENERAL PHARMACY W/ HCPCS (ALT 636 FOR OP/ED): Performed by: INTERNAL MEDICINE

## 2024-06-09 PROCEDURE — 97116 GAIT TRAINING THERAPY: CPT | Mod: CQ,GP

## 2024-06-09 PROCEDURE — 83735 ASSAY OF MAGNESIUM: CPT | Performed by: INTERNAL MEDICINE

## 2024-06-09 PROCEDURE — 80048 BASIC METABOLIC PNL TOTAL CA: CPT | Performed by: INTERNAL MEDICINE

## 2024-06-09 PROCEDURE — 36415 COLL VENOUS BLD VENIPUNCTURE: CPT | Performed by: INTERNAL MEDICINE

## 2024-06-09 PROCEDURE — S4991 NICOTINE PATCH NONLEGEND: HCPCS | Performed by: INTERNAL MEDICINE

## 2024-06-09 PROCEDURE — 1210000001 HC SEMI-PRIVATE ROOM DAILY

## 2024-06-09 RX ADMIN — Medication 21 PERCENT: at 08:00

## 2024-06-09 RX ADMIN — LIDOCAINE 4% 1 PATCH: 40 PATCH TOPICAL at 04:00

## 2024-06-09 RX ADMIN — APIXABAN 10 MG: 5 TABLET, FILM COATED ORAL at 08:28

## 2024-06-09 RX ADMIN — NICOTINE 1 PATCH: 21 PATCH, EXTENDED RELEASE TRANSDERMAL at 01:00

## 2024-06-09 RX ADMIN — POLYETHYLENE GLYCOL 3350 17 G: 17 POWDER, FOR SOLUTION ORAL at 08:30

## 2024-06-09 RX ADMIN — Medication 1 TABLET: at 08:28

## 2024-06-09 RX ADMIN — APIXABAN 10 MG: 5 TABLET, FILM COATED ORAL at 20:02

## 2024-06-09 RX ADMIN — FOLIC ACID 1 MG: 1 TABLET ORAL at 08:28

## 2024-06-09 RX ADMIN — SENNOSIDES AND DOCUSATE SODIUM 2 TABLET: 50; 8.6 TABLET ORAL at 08:28

## 2024-06-09 RX ADMIN — THIAMINE HCL TAB 100 MG 100 MG: 100 TAB at 08:28

## 2024-06-09 RX ADMIN — OXYCODONE HYDROCHLORIDE 10 MG: 10 TABLET ORAL at 15:43

## 2024-06-09 ASSESSMENT — LIFESTYLE VARIABLES
AGITATION: NORMAL ACTIVITY
VISUAL DISTURBANCES: NOT PRESENT
HEADACHE, FULLNESS IN HEAD: NOT PRESENT
TREMOR: NO TREMOR
TREMOR: NO TREMOR
TOTAL SCORE: 0
VISUAL DISTURBANCES: NOT PRESENT
HEADACHE, FULLNESS IN HEAD: NOT PRESENT
PAROXYSMAL SWEATS: NO SWEAT VISIBLE
PAROXYSMAL SWEATS: NO SWEAT VISIBLE
AUDITORY DISTURBANCES: NOT PRESENT
TOTAL SCORE: 0
AUDITORY DISTURBANCES: NOT PRESENT
ORIENTATION AND CLOUDING OF SENSORIUM: ORIENTED AND CAN DO SERIAL ADDITIONS
NAUSEA AND VOMITING: NO NAUSEA AND NO VOMITING
AGITATION: NORMAL ACTIVITY
ORIENTATION AND CLOUDING OF SENSORIUM: ORIENTED AND CAN DO SERIAL ADDITIONS
NAUSEA AND VOMITING: NO NAUSEA AND NO VOMITING
ANXIETY: NO ANXIETY, AT EASE
ANXIETY: NO ANXIETY, AT EASE

## 2024-06-09 ASSESSMENT — COGNITIVE AND FUNCTIONAL STATUS - GENERAL
MOVING FROM LYING ON BACK TO SITTING ON SIDE OF FLAT BED WITH BEDRAILS: A LITTLE
TURNING FROM BACK TO SIDE WHILE IN FLAT BAD: A LITTLE
CLIMB 3 TO 5 STEPS WITH RAILING: A LOT
CLIMB 3 TO 5 STEPS WITH RAILING: TOTAL
DAILY ACTIVITIY SCORE: 19
MOBILITY SCORE: 16
HELP NEEDED FOR BATHING: A LITTLE
MOVING TO AND FROM BED TO CHAIR: A LOT
STANDING UP FROM CHAIR USING ARMS: A LOT
WALKING IN HOSPITAL ROOM: A LOT
TURNING FROM BACK TO SIDE WHILE IN FLAT BAD: A LITTLE
MOBILITY SCORE: 13
TOILETING: A LITTLE
MOVING FROM LYING ON BACK TO SITTING ON SIDE OF FLAT BED WITH BEDRAILS: A LITTLE
MOVING TO AND FROM BED TO CHAIR: A LITTLE
DRESSING REGULAR LOWER BODY CLOTHING: A LITTLE
DRESSING REGULAR UPPER BODY CLOTHING: A LITTLE
WALKING IN HOSPITAL ROOM: A LOT
STANDING UP FROM CHAIR USING ARMS: A LITTLE
PERSONAL GROOMING: A LITTLE

## 2024-06-09 ASSESSMENT — PAIN - FUNCTIONAL ASSESSMENT
PAIN_FUNCTIONAL_ASSESSMENT: 0-10

## 2024-06-09 ASSESSMENT — PAIN DESCRIPTION - LOCATION: LOCATION: GENERALIZED

## 2024-06-09 ASSESSMENT — PAIN SCALES - GENERAL
PAINLEVEL_OUTOF10: 3
PAINLEVEL_OUTOF10: 2
PAINLEVEL_OUTOF10: 10 - WORST POSSIBLE PAIN

## 2024-06-09 NOTE — CARE PLAN
The patient's goals for the shift include        Problem: Skin  Goal: Participates in plan/prevention/treatment measures  Flowsheets (Taken 6/9/2024 0740)  Participates in plan/prevention/treatment measures:   Elevate heels   Discuss with provider PT/OT consult   Increase activity/out of bed for meals  Goal: Prevent/manage excess moisture  Flowsheets (Taken 6/9/2024 0740)  Prevent/manage excess moisture:   Cleanse incontinence/protect with barrier cream   Follow provider orders for dressing changes   Moisturize dry skin   Monitor for/manage infection if present  Goal: Prevent/minimize sheer/friction injuries  Flowsheets (Taken 6/9/2024 0740)  Prevent/minimize sheer/friction injuries:   Turn/reposition every 2 hours/use positioning/transfer devices   Increase activity/out of bed for meals   Use pull sheet  Goal: Promote/optimize nutrition  Flowsheets (Taken 6/9/2024 0740)  Promote/optimize nutrition:   Consume > 50% meals/supplements   Monitor/record intake including meals   Offer water/supplements/favorite foods     Problem: Fall/Injury  Goal: Be free from injury by end of the shift  Outcome: Progressing  Goal: Not fall by end of shift  Outcome: Progressing  Goal: Verbalize understanding of personal risk factors for fall in the hospital  Outcome: Progressing  Goal: Verbalize understanding of risk factor reduction measures to prevent injury from fall in the home  Outcome: Progressing  Goal: Use assistive devices by end of the shift  Outcome: Progressing  Goal: Pace activities to prevent fatigue by end of the shift  Outcome: Progressing     Problem: Deep Vein Thrombosis  Goal: I will remain free from complications of deep vein thrombosis and maintain current level of mobility  Outcome: Progressing     Problem: Pain  Goal: Takes deep breaths with improved pain control throughout the shift  Outcome: Progressing  Goal: Turns in bed with improved pain control throughout the shift  Outcome: Progressing     Problem: Pain -  Adult  Goal: Verbalizes/displays adequate comfort level or baseline comfort level  Outcome: Progressing     Problem: Safety - Adult  Goal: Free from fall injury  Outcome: Progressing     Problem: Discharge Planning  Goal: Discharge to home or other facility with appropriate resources  Outcome: Progressing     Problem: Chronic Conditions and Co-morbidities  Goal: Patient's chronic conditions and co-morbidity symptoms are monitored and maintained or improved  Outcome: Progressing     Problem: Nutrition  Goal: Oral intake greater 75%  Outcome: Progressing  Goal: Consume prescribed supplement  Outcome: Progressing  Goal: Promote healing  Outcome: Progressing  Goal: Maintain stable weight  Outcome: Progressing

## 2024-06-09 NOTE — PROGRESS NOTES
PROGRESS NOTE    Teddy García is a 63 y.o. male on day 4 of admission presenting with Multiple pulmonary emboli (Multi).    SUBJECTIVE:    History Of Present Illness:  Teddy García is a 63 y.o.  male with a past medical history significant for EtOH abuse with h/o withdrawal seizures / DTs, tobacco use disorder, history of Left Toe Ulcer/Infection in 2022 and 2023, anxiety and depression. He does have a history of ambulatory dysfunction and states he uses a rollator at baseline. He states he smokes and drinks daily but unfortunately would not characterize exactly how much. He states he fell onto his left knee a few days ago and it has been hurting. Overall he feels unwell for over a week he said maybe. He states he may have fallen a week ago but he didn't really remember.  He had also noted some pain in his left lower rib particularly when he takes a deep breath then which she thought was due to him pulling a muscle versus his recurrent falls over the past several days.  Denies initiation of any new medications.  He denied any recent sick contacts, chemical/environmental exposures, changes in dietary habits or any recent traumatic events/falls other than noted above.  He denies any fevers, chills, night sweats, vision changes, auditory changes, change in taste/smell, loss of bowel/bladder control, loss consciousness, dizziness, vertigo, syncope, seizure-like activity, chest pain, palpitations, shortness of breath, coughing, wheezing, congestion, hemoptysis, hematemesis, abdominal pain, nausea, vomiting, diarrhea, constipation, dysuria, hematuria, dyschezia, hematochezia or any lateralizing motor/sensory deficits other than noted above.     Patient stated that over the past 2 days maybe longer he really has not been able to get up out of his couch other than to really go to the bathroom and at times has had significant difficulties getting up and off of the toilet and has spent several hours laying on  his couch or sitting on the toilet.  Neighbors had called EMS for a well check and they had noted that the patient was quite unkempt as well as his house and a bit of disarray as well.  He states that he has had very poor p.o. intake over the past several days as well due to significant weakness and inability to get some food and has not had anything to eat since yesterday morning.    6/5 Spoke with the patient today and he appeared tired, but alert and oriented. He reported shortness of breath, and chest pain during inspiration, particularly on the left side of chest. He denies any palpitations, lower extremity pain or heaviness.   Overnight events: Patient desaturated to mid 80s and placed on 4 L Oxygen. Positive Lower extremity venous duplex scan.  6/6: No acute events overnight.  Patient is alert and oriented.  He is still very frail.  He has significant pleuritic chest pain.  He is very shaky.  Started on Eliquis.  PT and OT recommendation appreciated.  Continue alcohol withdrawal protocol.  Patient will require skilled nursing facility on discharge.  6/7: No acute events overnight.  Patient states that he has pain all over.  He does have some pleuritic chest pain he still feels weak although overall improving.  He still shaky.  Denies any abdominal pain, nausea, vomiting.  He has no bleeding from any site continue Eliquis.  Awaiting prior authorization to skilled nursing facility.  Continue to treat for alcohol withdrawal.  6/8: No acute events overnight.  Patient much more relaxed.  His pains are improving.  He feels stronger.  He denies any focal weakness or numbness.  Denies any shortness of breath, chest pain, palpitation lightheadedness.  We are currently awaiting prior authorization.  Continue alcohol withdrawal protocol.  6/9: No acute events overnight.  Patient denies any chest pain, shortness of breath, palpitation lightheadedness.  Patient was admitted with acute PE and DVT due to sedentary lifestyle  and chronic alcohol abuse.  Continue Eliquis.  Awaiting prior authorization to skilled nursing facility.    OBJECTIVE:  Objective     Last Recorded Vitals  /80 (BP Location: Left arm, Patient Position: Lying)   Pulse 68   Temp 36.9 °C (98.5 °F) (Temporal)   Resp 17   Wt 92.5 kg (203 lb 14.8 oz)   SpO2 94%     Intake/Output last 3 Shifts:    Intake/Output Summary (Last 24 hours) at 6/9/2024 1953  Last data filed at 6/9/2024 1616  Gross per 24 hour   Intake 700 ml   Output 1150 ml   Net -450 ml       Admission Weight  Weight: 97.5 kg (215 lb) (06/04/24 2038)    Daily Weight  06/07/24 : 92.5 kg (203 lb 14.8 oz)      Image Results  Electrocardiogram, 12-lead PRN ACS symptoms  Sinus rhythm with Premature atrial complexes  Otherwise normal ECG  When compared with ECG of 06-JUN-2024 21:28, (unconfirmed)  No significant change was found      CONSTITUTIONAL -frail elderly looking male, alert and oriented x 3  CHEST -still with decreased breath sounds bilaterally,  CARDIAC - regular rate and regular rhythm, no murmur  ABDOMEN - no organomegaly, soft, nontender  EXTREMITIES - no edema, no deformities  NEUROLOGICAL - alert, oriented x3 and no acute focal signs, less tremulous  PSYCHIATRIC - alert, relaxed      Relevant Results  Scheduled medications  apixaban, 10 mg, oral, BID   Followed by  [START ON 6/12/2024] apixaban, 5 mg, oral, BID  folic acid, 1 mg, oral, Daily  lidocaine, 1 patch, transdermal, q24h  multivitamin with minerals, 1 tablet, oral, Daily  nicotine, 1 patch, transdermal, q24h   Followed by  [START ON 7/17/2024] nicotine, 1 patch, transdermal, q24h   Followed by  [START ON 7/31/2024] nicotine, 1 patch, transdermal, q24h  oxygen, , inhalation, Continuous - Inhalation  polyethylene glycol, 17 g, oral, Daily  sennosides-docusate sodium, 2 tablet, oral, BID  thiamine, 100 mg, oral, Daily      Continuous medications     PRN medications  PRN medications: acetaminophen, cyclobenzaprine,  ipratropium-albuteroL, oxyCODONE, oxyCODONE     Results for orders placed or performed during the hospital encounter of 06/04/24 (from the past 24 hour(s))   Basic Metabolic Panel   Result Value Ref Range    Glucose 215 (H) 74 - 99 mg/dL    Sodium 131 (L) 136 - 145 mmol/L    Potassium 4.0 3.5 - 5.3 mmol/L    Chloride 98 98 - 107 mmol/L    Bicarbonate 26 21 - 32 mmol/L    Anion Gap 11 10 - 20 mmol/L    Urea Nitrogen 15 6 - 23 mg/dL    Creatinine 0.49 (L) 0.50 - 1.30 mg/dL    eGFR >90 >60 mL/min/1.73m*2    Calcium 8.4 (L) 8.6 - 10.3 mg/dL   Magnesium   Result Value Ref Range    Magnesium 1.73 1.60 - 2.40 mg/dL                  ASSESSMENT AND PLAN:  Assessment/Plan                  Principal Problem:    Multiple pulmonary emboli (Multi)    Extensive Bilateral Saddle Pulmonary Emboli   Pulmonary Parenchymal Infarct (left lower lobe/lingula)  -CTA Chest as noted above without signs of right heart strain  -Heparin gtt   -TTE pending  -will need to transition to oral agents accordingly  -no signs of right heart strain and remains hemodynamically stable currently  -will need to discuss further with PERT team if thrombectomy is warranted give the extensive clot burden  -unclear inciting event but suspect repeat falls and limited mobility   -Lower Extremity Venous Duplex = pending  -Lidocaine Patch and analgesics  -Incentive Spirometer   -Telemetry Monitoring   6/5 PERT team consult appreciated- patient not a candidate for thrombectomy, Echo Pending, starting pt on Eliquis and will monitor  6/6: Started on Eliquis.  DVT and PE likely due to very sedentary lifestyle  6/7: Continue Eliquis.  Continue PT and OT.  Awaiting authorization to skilled nursing facility.  6/8: Continue current treatment.  Awaiting prior authorization to skilled nursing facility.  6/9: Continue Eliquis.  Awaiting prior authorization to skilled nursing facility.     Alcohol dependency  with Alcohol Withdrawal Syndrome  History of Alcohol Withdrawal  Syndrome/Delirium Tremens with withdrawal seizures  -Alcohol Level <10  -UDS pending  -CIWA protocol with PO Ativan  -MVI/Folate/Thiamine  6/6: Continue to monitor for alcohol withdrawal.  Very shaky.  6/7: Continue alcohol withdrawal treatment.  6/8: Less tremulous today.  Continue CIWA.  6/9: Will discontinue CIWA.     Thrombocytopenia secondary to alcohol abuse  -Plt Ct = 149 (04/25/24) --> 119 -->  -known history of thrombocytopenia averaging around the 150s - 180s at baseline (possibly in setting of alcohol use disorder)  -no over signs of bleeding or abnormal bleeding currently   -monitor closely especially in setting of need for heparin and blood thinners in general for extensive pulmonary emboli     Traumatic rhabdomyolysis  -elevated CK possibly in setting of above vs being of limited mobility as of late?  -suspect also why he has become even weaker and with limited ROM/Strength particularly in the lower extremities   -Urinalysis = pending  -CK = 1911 -->1247 -->  -will start of IVFs but monitor fluid status closely in setting of extensive pulmonary emboli regardless of if heart strain is not present at this time  6/9: Resolved     Hypomagnesemia  Hypokalemia  Hyponatremia  Severe protein calorie malnutrition clinically  -likely poor intake  -replace accordingly  -Sodium = 132 --> 134 -->   -Potassium = 3.3 --> 2.9 -->   -Magnesium = 1.47 -  6/6: Continue to replete electrolytes.  6/8: Magnesium low at 1.29.  Will give 4 g of IV magnesium  6/9: Potassium 4, magnesium 1.73.    Acute on Chronic Deconditioning   Ambulatory Dysfunction  Inability to Care for Self  -PT/OT appreciated  -SW/CM appreciated                 Edmundo Tejeda MD

## 2024-06-09 NOTE — PROGRESS NOTES
Physical Therapy  Physical Therapy Treatment    Patient Name: Teddy García  MRN: [id number]  Today's Date: 6/9/2024  Time Calculation  Start Time: 0750  Stop Time: 0813  Time Calculation (min): 23 min    Assessment/Plan   PT Plan  Treatment/Interventions: Bed mobility, Transfer training, Gait training, Neuromuscular re-education, Therapeutic exercise, Therapeutic activity  PT Plan: Skilled PT  PT Frequency: 4 times per week  PT Discharge Recommendations: Moderate intensity level of continued care, 24 hr supervision due to cognition  PT Recommended Transfer Status: Assist x2  PT - OK to Discharge: Yes (to next level of care when cleared by medical team)    General Visit Information:   PT  Visit  PT Received On: 06/09/24  Response to Previous Treatment: Patient with no complaints from previous session.    Reason for Referral: PE/weakness/Acute resp failure  Room: 332    Subjective   Precautions:  Falls  CIWA protocol    Objective   Pain:  3/10 generalized chronic pain    Cognition:  Oriented X4    Activity Tolerance:  Endurance: Tolerates 10 - 20 min exercise with multiple rests    Treatments:  Bed Mobility  Supine to sitting: Minimum assistance  Sitting to supine: Minimum assistance  Scooting: Minimum assistance  Rolling: Minimum assistance    Transfers  Sit to stand: Minimum assistance  Stand to sit: Minimum assistance  Transfer Device: Walker    Ambulation/Gait Training  20 feet x2 reps with FWW, Nilsa  Seated rest between reps. Pt demo slow clinton, decreased step length, unsteady.    Pt RTB following tx. Alarm on and call light in reach.     Outcome Measures:  [facility] Basic Mobility  Turning from your back to your side while in a flat bed without using bedrails: A little  Moving from lying on your back to sitting on the side of a flat bed without using bedrails: A little  Moving to and from bed to chair (including a wheelchair): A lot  Standing up from a chair using your arms (e.g. wheelchair or bedside chair): A  lot  To walk in hospital room: A lot  Climbing 3-5 steps with railing: Total  Basic Mobility - Total Score: 13    Education Documentation  Mobility Training, taught by Anselmo Chatterjee PTA at 6/9/2024 11:27 AM.  Learner: Patient  Readiness: Acceptance  Method: Explanation, Demonstration  Response: Needs Reinforcement    Education Comments  No comments found.        OP EDUCATION:       Encounter Problems       Encounter Problems (Active)       Mobility       STG - Patient will ambulate 5-10 ft with FWW (uses ROLLATOR at home) to access chair/BSC to reduce bedrest (Progressing)       Start:  06/05/24    Expected End:  06/19/24               PT Transfers       STG - Patient will transfer from one surface to another with reduced caregiver nassist (Progressing)       Start:  06/05/24    Expected End:  06/19/24               Pain - Adult          strength       STG-pt will perform 10-20 reps AROM BLE (caution RLE DVT's) (Progressing)       Start:  06/05/24    Expected End:  06/19/24

## 2024-06-10 VITALS
DIASTOLIC BLOOD PRESSURE: 79 MMHG | OXYGEN SATURATION: 93 % | SYSTOLIC BLOOD PRESSURE: 134 MMHG | HEART RATE: 72 BPM | TEMPERATURE: 99 F | WEIGHT: 203.93 LBS | BODY MASS INDEX: 24.83 KG/M2 | HEIGHT: 76 IN | RESPIRATION RATE: 18 BRPM

## 2024-06-10 LAB
ANION GAP SERPL CALC-SCNC: 11 MMOL/L (ref 10–20)
ATRIAL RATE: 78 BPM
BUN SERPL-MCNC: 16 MG/DL (ref 6–23)
CALCIUM SERPL-MCNC: 8.7 MG/DL (ref 8.6–10.3)
CARBOXYTHC UR-MCNC: >500 NG/ML
CHLORIDE SERPL-SCNC: 99 MMOL/L (ref 98–107)
CK SERPL-CCNC: 107 U/L (ref 0–325)
CO2 SERPL-SCNC: 25 MMOL/L (ref 21–32)
CREAT SERPL-MCNC: 0.47 MG/DL (ref 0.5–1.3)
EGFRCR SERPLBLD CKD-EPI 2021: >90 ML/MIN/1.73M*2
GLUCOSE SERPL-MCNC: 189 MG/DL (ref 74–99)
MAGNESIUM SERPL-MCNC: 1.5 MG/DL (ref 1.6–2.4)
P AXIS: 61 DEGREES
P OFFSET: 196 MS
P ONSET: 135 MS
POTASSIUM SERPL-SCNC: 4.1 MMOL/L (ref 3.5–5.3)
PR INTERVAL: 170 MS
Q ONSET: 220 MS
QRS COUNT: 13 BEATS
QRS DURATION: 98 MS
QT INTERVAL: 416 MS
QTC CALCULATION(BAZETT): 474 MS
QTC FREDERICIA: 454 MS
R AXIS: 52 DEGREES
SODIUM SERPL-SCNC: 131 MMOL/L (ref 136–145)
T AXIS: 57 DEGREES
T OFFSET: 428 MS
VENTRICULAR RATE: 78 BPM

## 2024-06-10 PROCEDURE — 80048 BASIC METABOLIC PNL TOTAL CA: CPT | Performed by: INTERNAL MEDICINE

## 2024-06-10 PROCEDURE — 2500000001 HC RX 250 WO HCPCS SELF ADMINISTERED DRUGS (ALT 637 FOR MEDICARE OP): Performed by: INTERNAL MEDICINE

## 2024-06-10 PROCEDURE — 36415 COLL VENOUS BLD VENIPUNCTURE: CPT | Performed by: INTERNAL MEDICINE

## 2024-06-10 PROCEDURE — 83735 ASSAY OF MAGNESIUM: CPT | Performed by: INTERNAL MEDICINE

## 2024-06-10 PROCEDURE — 2500000002 HC RX 250 W HCPCS SELF ADMINISTERED DRUGS (ALT 637 FOR MEDICARE OP, ALT 636 FOR OP/ED): Performed by: INTERNAL MEDICINE

## 2024-06-10 PROCEDURE — 99239 HOSP IP/OBS DSCHRG MGMT >30: CPT | Performed by: INTERNAL MEDICINE

## 2024-06-10 PROCEDURE — 2500000004 HC RX 250 GENERAL PHARMACY W/ HCPCS (ALT 636 FOR OP/ED): Performed by: INTERNAL MEDICINE

## 2024-06-10 PROCEDURE — S4991 NICOTINE PATCH NONLEGEND: HCPCS | Performed by: INTERNAL MEDICINE

## 2024-06-10 PROCEDURE — 82550 ASSAY OF CK (CPK): CPT | Performed by: INTERNAL MEDICINE

## 2024-06-10 RX ORDER — OXYCODONE HYDROCHLORIDE 5 MG/1
5 TABLET ORAL EVERY 8 HOURS PRN
Qty: 9 TABLET | Refills: 0 | Status: SHIPPED | OUTPATIENT
Start: 2024-06-10 | End: 2024-06-10

## 2024-06-10 RX ORDER — OXYCODONE HYDROCHLORIDE 5 MG/1
5 TABLET ORAL EVERY 8 HOURS PRN
Qty: 9 TABLET | Refills: 0 | Status: SHIPPED | OUTPATIENT
Start: 2024-06-10 | End: 2024-06-13

## 2024-06-10 RX ORDER — LANOLIN ALCOHOL/MO/W.PET/CERES
100 CREAM (GRAM) TOPICAL DAILY
Start: 2024-06-11

## 2024-06-10 RX ORDER — MULTIVIT-MIN/IRON FUM/FOLIC AC 7.5 MG-4
1 TABLET ORAL DAILY
Start: 2024-06-11

## 2024-06-10 RX ORDER — IBUPROFEN 200 MG
1 TABLET ORAL EVERY 24 HOURS
Start: 2024-07-17 | End: 2024-07-31

## 2024-06-10 RX ORDER — FOLIC ACID 1 MG/1
1 TABLET ORAL DAILY
Start: 2024-06-11

## 2024-06-10 RX ORDER — LIDOCAINE 560 MG/1
1 PATCH PERCUTANEOUS; TOPICAL; TRANSDERMAL EVERY 24 HOURS
Start: 2024-06-11

## 2024-06-10 RX ORDER — IBUPROFEN 200 MG
1 TABLET ORAL EVERY 24 HOURS
Start: 2024-06-11 | End: 2024-07-17

## 2024-06-10 RX ORDER — ACETAMINOPHEN 325 MG/1
650 TABLET ORAL EVERY 4 HOURS PRN
Start: 2024-06-10

## 2024-06-10 RX ORDER — NICOTINE 7MG/24HR
1 PATCH, TRANSDERMAL 24 HOURS TRANSDERMAL EVERY 24 HOURS
Start: 2024-07-31 | End: 2024-08-14

## 2024-06-10 RX ORDER — CYCLOBENZAPRINE HCL 5 MG
5 TABLET ORAL 3 TIMES DAILY PRN
Start: 2024-06-10

## 2024-06-10 RX ADMIN — NICOTINE 1 PATCH: 21 PATCH, EXTENDED RELEASE TRANSDERMAL at 01:00

## 2024-06-10 RX ADMIN — Medication 1 TABLET: at 09:32

## 2024-06-10 RX ADMIN — FOLIC ACID 1 MG: 1 TABLET ORAL at 09:32

## 2024-06-10 RX ADMIN — THIAMINE HCL TAB 100 MG 100 MG: 100 TAB at 09:32

## 2024-06-10 RX ADMIN — SENNOSIDES AND DOCUSATE SODIUM 2 TABLET: 50; 8.6 TABLET ORAL at 09:32

## 2024-06-10 RX ADMIN — APIXABAN 10 MG: 5 TABLET, FILM COATED ORAL at 09:32

## 2024-06-10 RX ADMIN — POLYETHYLENE GLYCOL 3350 17 G: 17 POWDER, FOR SOLUTION ORAL at 09:33

## 2024-06-10 ASSESSMENT — LIFESTYLE VARIABLES
TOTAL SCORE: 0
AUDITORY DISTURBANCES: NOT PRESENT
ORIENTATION AND CLOUDING OF SENSORIUM: ORIENTED AND CAN DO SERIAL ADDITIONS
PAROXYSMAL SWEATS: NO SWEAT VISIBLE
HEADACHE, FULLNESS IN HEAD: NOT PRESENT
VISUAL DISTURBANCES: NOT PRESENT
TREMOR: NO TREMOR
NAUSEA AND VOMITING: NO NAUSEA AND NO VOMITING
ANXIETY: NO ANXIETY, AT EASE
AGITATION: NORMAL ACTIVITY
NAUSEA AND VOMITING: NO NAUSEA AND NO VOMITING

## 2024-06-10 ASSESSMENT — COGNITIVE AND FUNCTIONAL STATUS - GENERAL
STANDING UP FROM CHAIR USING ARMS: A LITTLE
MOBILITY SCORE: 16
DAILY ACTIVITIY SCORE: 17
TURNING FROM BACK TO SIDE WHILE IN FLAT BAD: A LITTLE
CLIMB 3 TO 5 STEPS WITH RAILING: A LOT
HELP NEEDED FOR BATHING: A LITTLE
DRESSING REGULAR LOWER BODY CLOTHING: A LOT
PERSONAL GROOMING: A LITTLE
TOILETING: A LOT
MOVING TO AND FROM BED TO CHAIR: A LOT
DRESSING REGULAR UPPER BODY CLOTHING: A LITTLE
WALKING IN HOSPITAL ROOM: A LOT

## 2024-06-10 ASSESSMENT — PAIN SCALES - GENERAL: PAINLEVEL_OUTOF10: 0 - NO PAIN

## 2024-06-10 NOTE — DISCHARGE SUMMARY
Discharge Diagnosis  Multiple pulmonary emboli (Multi)    Issues Requiring Follow-Up  Discharge to skilled nursing facility for ongoing PT and OT.  Follow-up with PCP after discharge.    Discharge Meds     Your medication list        START taking these medications        Instructions Last Dose Given Next Dose Due   acetaminophen 325 mg tablet  Commonly known as: Tylenol      Take 2 tablets (650 mg) by mouth every 4 hours if needed for mild pain (1 - 3).       apixaban 5 mg tablet  Commonly known as: Eliquis  Start taking on: Sanjuana 10, 2024      Take 2 tablets (10 mg) by mouth 2 times a day for 2 days, THEN 1 tablet (5 mg) 2 times a day.       cyclobenzaprine 5 mg tablet  Commonly known as: Flexeril      Take 1 tablet (5 mg) by mouth 3 times a day as needed for muscle spasms.       folic acid 1 mg tablet  Commonly known as: Folvite  Start taking on: June 11, 2024      Take 1 tablet (1 mg) by mouth once daily.       lidocaine 4 % patch  Start taking on: June 11, 2024      Place 1 patch over 12 hours on the skin once every 24 hours. Remove & discard patch within 12 hours or as directed by MD.       multivitamin with minerals tablet  Start taking on: June 11, 2024      Take 1 tablet by mouth once daily.       nicotine 21 mg/24 hr patch  Commonly known as: Nicoderm CQ  Start taking on: June 11, 2024      Place 1 patch over 24 hours on the skin once every 24 hours for 36 doses.       nicotine 14 mg/24 hr patch  Commonly known as: Nicoderm CQ  Start taking on: July 17, 2024      Place 1 patch over 24 hours on the skin once every 24 hours for 14 doses.       nicotine 7 mg/24 hr patch  Commonly known as: Nicoderm CQ  Start taking on: July 31, 2024      Place 1 patch over 24 hours on the skin once every 24 hours for 14 doses.       oxyCODONE 5 mg immediate release tablet  Commonly known as: Roxicodone      Take 1 tablet (5 mg) by mouth every 8 hours if needed for moderate pain (4 - 6) or severe pain (7 - 10) for up to 3  days.       thiamine 100 mg tablet  Commonly known as: Vitamin B-1  Start taking on: June 11, 2024      Take 1 tablet (100 mg) by mouth once daily.              CONTINUE taking these medications        Instructions Last Dose Given Next Dose Due   escitalopram 20 mg tablet  Commonly known as: Lexapro           OLANZapine 5 mg tablet  Commonly known as: ZyPREXA                  ASK your doctor about these medications        Instructions Last Dose Given Next Dose Due   citalopram 20 mg tablet  Commonly known as: CeleXA      Take 1 tablet (20 mg) by mouth once daily.       hydrOXYzine pamoate 50 mg capsule  Commonly known as: VistariL      Take 1 capsule (50 mg) by mouth 2 times a day as needed for anxiety.       traZODone 100 mg tablet  Commonly known as: Desyrel      Take 1 tablet (100 mg) by mouth once daily at bedtime.                 Where to Get Your Medications        You can get these medications from any pharmacy    Bring a paper prescription for each of these medications  oxyCODONE 5 mg immediate release tablet       Information about where to get these medications is not yet available    Ask your nurse or doctor about these medications  acetaminophen 325 mg tablet  apixaban 5 mg tablet  cyclobenzaprine 5 mg tablet  folic acid 1 mg tablet  lidocaine 4 % patch  multivitamin with minerals tablet  nicotine 14 mg/24 hr patch  nicotine 21 mg/24 hr patch  nicotine 7 mg/24 hr patch  thiamine 100 mg tablet         Test Results Pending At Discharge  Pending Labs       Order Current Status    Creatine Kinase In process    THC (Marijuana), Urine, Confirmation In process            Hospital Course   Teddy García is a 63 y.o.  male with a past medical history significant for EtOH abuse with h/o withdrawal seizures / DTs, tobacco use disorder, history of Left Toe Ulcer/Infection in 2022 and 2023, anxiety and depression. He does have a history of ambulatory dysfunction and states he uses a rollator at baseline. He  states he smokes and drinks daily but unfortunately would not characterize exactly how much. He states he fell onto his left knee a few days ago and it has been hurting. Overall he feels unwell for over a week he said maybe. He states he may have fallen a week ago but he didn't really remember.  He had also noted some pain in his left lower rib particularly when he takes a deep breath then which she thought was due to him pulling a muscle versus his recurrent falls over the past several days.  Denies initiation of any new medications.  He denied any recent sick contacts, chemical/environmental exposures, changes in dietary habits or any recent traumatic events/falls other than noted above.  He denies any fevers, chills, night sweats, vision changes, auditory changes, change in taste/smell, loss of bowel/bladder control, loss consciousness, dizziness, vertigo, syncope, seizure-like activity, chest pain, palpitations, shortness of breath, coughing, wheezing, congestion, hemoptysis, hematemesis, abdominal pain, nausea, vomiting, diarrhea, constipation, dysuria, hematuria, dyschezia, hematochezia or any lateralizing motor/sensory deficits other than noted above.     Patient stated that over the past 2 days maybe longer he really has not been able to get up out of his couch other than to really go to the bathroom and at times has had significant difficulties getting up and off of the toilet and has spent several hours laying on his couch or sitting on the toilet.  Neighbors had called EMS for a well check and they had noted that the patient was quite unkempt as well as his house and a bit of disarray as well.  He states that he has had very poor p.o. intake over the past several days as well due to significant weakness and inability to get some food and has not had anything to eat since yesterday morning.     6/5 Spoke with the patient today and he appeared tired, but alert and oriented. He reported shortness of breath,  and chest pain during inspiration, particularly on the left side of chest. He denies any palpitations, lower extremity pain or heaviness.   Overnight events: Patient desaturated to mid 80s and placed on 4 L Oxygen. Positive Lower extremity venous duplex scan.  6/6: No acute events overnight.  Patient is alert and oriented.  He is still very frail.  He has significant pleuritic chest pain.  He is very shaky.  Started on Eliquis.  PT and OT recommendation appreciated.  Continue alcohol withdrawal protocol.  Patient will require skilled nursing facility on discharge.  6/7: No acute events overnight.  Patient states that he has pain all over.  He does have some pleuritic chest pain he still feels weak although overall improving.  He still shaky.  Denies any abdominal pain, nausea, vomiting.  He has no bleeding from any site continue Eliquis.  Awaiting prior authorization to skilled nursing facility.  Continue to treat for alcohol withdrawal.  6/8: No acute events overnight.  Patient much more relaxed.  His pains are improving.  He feels stronger.  He denies any focal weakness or numbness.  Denies any shortness of breath, chest pain, palpitation lightheadedness.  We are currently awaiting prior authorization.  Continue alcohol withdrawal protocol.  6/9: No acute events overnight.  Patient denies any chest pain, shortness of breath, palpitation lightheadedness.  Patient was admitted with acute PE and DVT due to sedentary lifestyle and chronic alcohol abuse.  Continue Eliquis.  Awaiting prior authorization to skilled nursing facility.  6/10: Patient seen.  Bed available at his chosen skilled nursing facility.  Will transfer patient over.  Reviewed anticoagulation precautions.  Will take 2-3 times longer to form clots, will need direct pressure for 20 to 30 minutes for small wounds or nosebleeds.  Patient to notify his doctor for hemoptysis or melena.  Patient is go to ED if he has significant melena or hematochezia, or  takes a blow to the head, torso, abdomen.  Will need anticoagulation for least 3 months.  Will consider repeat Doppler ultrasound before discontinuing apixaban.  Defer that decision to PCP.  Patient to follow-up PCP after discharge from facility.    This discharge took greater than 35 minutes to arrange    Extensive saddle pulmonary emboli with pulmonary parenchymal infarct.  No evidence of RV strain.  Also has extensive right lower extremity DVT.  Anticoagulate with apixaban, currently on loading dose.  DVT appears to be provoked secondary to an injury to his leg and immobility.  Recommend anticoagulation for 3 to 6 months.  Can have a repeat Doppler done around 3 months to see if it is still there.  Reviewed anticoagulation precautions with patient.  Alcohol dependence with alcohol withdrawal syndrome.  Has a history of prior DTs.  No evidence of active withdrawal this admission.  CIWA has been discontinued.  Thrombocytopenia likely secondary to alcohol use.  Suspect baseline.  No unusual bleeding at present.  Traumatic rhabdomyolysis may have been related to his immobility.  CPK is improving.  Ordered repeat CPK today but patient can likely be discharged to facility.  Low magnesium and potassium, replacing.  Severe protein calorie malnutrition.  Continue supplements.  Severe debility and deconditioning.  Skilled nursing facility is recommended on discharge, referred to skilled nursing due to his weakness and high likelihood of fall.      Pertinent Physical Exam At Time of Discharge  Physical Exam  CONSTITUTIONAL -frail elderly looking male, alert and oriented x 3  CHEST -still with decreased breath sounds bilaterally,  CARDIAC - regular rate and regular rhythm, no murmur  ABDOMEN - no organomegaly, soft, nontender  EXTREMITIES - no edema, no deformities  NEUROLOGICAL - alert, oriented x3 and no acute focal signs, less tremulous  PSYCHIATRIC - alert, relaxed    Outpatient Follow-Up  Future Appointments   Date Time  Provider Department Center   6/26/2024  2:20 PM Adelia Neal, APRN-CNP [facility]         Stiven Pittman MD

## 2024-06-10 NOTE — NURSING NOTE
Pt to be discharged to [facility]. Report called to Stephan. Anticipate transport approx. 1530. IV x2 discontinued and belongings packed.

## 2024-06-10 NOTE — PROGRESS NOTES
Oliver remains pending at this time for discharge to [facility]. [facility] following.     Oliver Approved. Dr Pittman placing discharge orders at this time. Will arrange transport and send orders once available with [facility].     [facility] to send discharge orders to facility and arrange transportation. Bedside RN aware, report number given.

## 2024-06-10 NOTE — PROGRESS NOTES
SW consult placed for CWI/DC planning and SNF Placement, TCC working on placement at [facility], per chart review pt identified as being at risk for alcohol misuse. SW met with pt, introduced self and role as SW with care transitions. Explored pt's thoughts toward drinking behaviors, awareness, and willingness for tx. Pt did identify drinking as an issue stating he drinks too much, will drink both beer and liquor, reports waking up at times and craving a drink. SW inquired about pt's interest in community addiction resources, pt stated he is not going to a rehab facility, SW educated pt on outpatient tx options including AA and other support groups, pt's attention wondered during conversation, asked where the conversation was going, SW reiterated role and community resources available to pt, offered to provide pt with resources, pt stated he just feels like lying down. SW explained to pt [facility] will have a  he can talk with further about resources if he feels they are needed, explained he can request SW prior to DC if he has further questions or would like SW to provide resources. No other needs identified at this time, SW signing off,  pt and care team aware of SW availability while inpt.     Mike Jessica, MSW, LSW ([id number])   Care Transitions

## 2024-06-10 NOTE — DISCHARGE INSTRUCTIONS
Follow-up with PCP, needs to establish with new PCP.  Anticoagulation for 3 to 6 months.  Would strongly consider reimaging patient's right lower extremity prior to discontinuing Eliquis.    You are starting anticoagulation therapy. Watch for signs of bleeding. Small cuts or wounds will take 2-3x longer to clot. So you will have to apply pressure for longer to stop bleeding. You should notify your doctor if you are coughing blood or see it in your urine. You should go the ER if you are throwing up blood or gritty material or if you see a lot of blood in your stool. You should go to the ER if you are struck in the head or torso. Typically patients tolerate anticoagulation without problems.

## 2024-06-10 NOTE — CARE PLAN
Problem: Skin  Goal: Participates in plan/prevention/treatment measures  Outcome: Progressing  Flowsheets (Taken 6/10/2024 1413)  Participates in plan/prevention/treatment measures:   Discuss with provider PT/OT consult   Elevate heels   Increase activity/out of bed for meals  Goal: Prevent/manage excess moisture  Outcome: Progressing  Flowsheets (Taken 6/10/2024 1413)  Prevent/manage excess moisture:   Moisturize dry skin   Cleanse incontinence/protect with barrier cream   Follow provider orders for dressing changes   Monitor for/manage infection if present  Goal: Prevent/minimize sheer/friction injuries  Outcome: Progressing  Flowsheets (Taken 6/10/2024 1413)  Prevent/minimize sheer/friction injuries:   HOB 30 degrees or less   Increase activity/out of bed for meals   Turn/reposition every 2 hours/use positioning/transfer devices   Use pull sheet  Goal: Promote/optimize nutrition  Outcome: Progressing  Flowsheets (Taken 6/10/2024 1413)  Promote/optimize nutrition:   Assist with feeding   Discuss with provider if NPO > 2 days   Consume > 50% meals/supplements   Monitor/record intake including meals   Offer water/supplements/favorite foods       The clinical goals for the shift include Pt will remain free from fall or injury through end of this shift    No fall or injury this shift. Current safety interventions continue, All needs met this shift. No new skin breakdown this shift.

## 2024-06-14 LAB
ATRIAL RATE: 80 BPM
P AXIS: 102 DEGREES
PR INTERVAL: 67 MS
Q ONSET: 253 MS
QRS COUNT: 13 BEATS
QRS DURATION: 105 MS
QT INTERVAL: 519 MS
QTC CALCULATION(BAZETT): 610 MS
QTC FREDERICIA: 578 MS
R AXIS: 79 DEGREES
T AXIS: 52 DEGREES
T OFFSET: 513 MS
VENTRICULAR RATE: 83 BPM

## 2024-06-26 ENCOUNTER — OFFICE VISIT (OUTPATIENT)
Dept: PULMONOLOGY | Facility: HOSPITAL | Age: 64
End: 2024-06-26
Payer: MEDICAID

## 2024-06-26 VITALS
SYSTOLIC BLOOD PRESSURE: 110 MMHG | RESPIRATION RATE: 16 BRPM | TEMPERATURE: 97.6 F | BODY MASS INDEX: 24.36 KG/M2 | HEART RATE: 92 BPM | WEIGHT: 200 LBS | DIASTOLIC BLOOD PRESSURE: 68 MMHG | HEIGHT: 76 IN | OXYGEN SATURATION: 97 %

## 2024-06-26 DIAGNOSIS — I26.99 MULTIPLE PULMONARY EMBOLI (MULTI): Primary | ICD-10-CM

## 2024-06-26 DIAGNOSIS — F17.210 CIGARETTE SMOKER: ICD-10-CM

## 2024-06-26 PROCEDURE — 99214 OFFICE O/P EST MOD 30 MIN: CPT | Performed by: NURSE PRACTITIONER

## 2024-06-26 PROCEDURE — 99204 OFFICE O/P NEW MOD 45 MIN: CPT | Performed by: NURSE PRACTITIONER

## 2024-06-26 ASSESSMENT — ENCOUNTER SYMPTOMS
FATIGUE: 0
UNEXPECTED WEIGHT CHANGE: 0
WHEEZING: 0
SHORTNESS OF BREATH: 0
RHINORRHEA: 0
COUGH: 0
FEVER: 0
CHILLS: 0

## 2024-06-26 NOTE — PATIENT INSTRUCTIONS
Continue on Eliquis twice a day, everyday. We need to continue this for at least 6 months.   Please get CT scan of your chest for follow up in the beginning of November.   Call with any questions or concerns.  Follow up with Dr. Castellon in mid/end of November.

## 2024-06-26 NOTE — PROGRESS NOTES
Subjective   Patient ID: Teddy García is a 63 y.o. male who presents for NPV for PE.     HPI: Patient has PMH of ETOH abuse, withdrawal seizures, DT's, nicotine dependence, anxiety, and depression. Patient was referred post hospital stay for acute saddle PE with large clot burden that was considered to be provoked due to immobility due to a leg injury. Patient is a poor historian, he states he did not know that he had blood clots in his lungs. He was found by neighbors that did a welfare check on him. He does not remember any shortness of breath or chest pain. He is currently residing at a SNF. He states that he follows up with a  at [facility]. He denies any shortness of breath, cough, or wheezing. He is a current smoker at 1 ppd. He also uses alcohol frequently. He has no concerns today.     Review of Systems   Constitutional:  Negative for chills, fatigue, fever and unexpected weight change.   HENT:  Negative for congestion, postnasal drip and rhinorrhea.    Respiratory:  Negative for cough (denies hemoptysis.), shortness of breath and wheezing.    Cardiovascular:  Negative for chest pain and leg swelling.   All other systems reviewed and are negative.      Objective   Physical Exam  Vitals reviewed.   Constitutional:       Appearance: Normal appearance.   HENT:      Head: Normocephalic.   Cardiovascular:      Rate and Rhythm: Normal rate and regular rhythm.   Pulmonary:      Effort: Pulmonary effort is normal.      Breath sounds: Normal breath sounds.   Skin:     General: Skin is warm and dry.   Neurological:      Mental Status: He is alert.         Assessment/Plan   Acute saddle PE  Nicotine dependence  ETOH abuse  Anxiety/depression, history of SI    Plan:    Patient was referred for hospital follow up for PE. He was discharged home on Eliquis. Hospital considered this to be provoked and recommended 3-6 months of Eliquis. He is currently residing at SNF and getting rehab, he is using his walker. He  "is a poor historian and cannot tell me much about his \"leg injury\" that was reported and how long it was going on. There is no one else present with him other than transport from SNF. He states that he lives alone and has a  from [place].     I recommend continuing Eliquis for at least 6 moths as he is still with limited mobility. He is at SNF currently. I will reassess continued need for 2.5 mg BID on follow up depending on mobility status. I will also repeat a CT chest prior to next visit as he had an acute saddle PE with large clot burden.     He is a smoker at 1 ppd and has smoked x 40 years. He is not currently motivated to quit. He is also dependent on ETOH and has history of DT's.     Overall ordered refills for Eliquis 5 mg BID for at least 6 months. I will bring him in for follow up in November and reassess for continued need for him based on mobility. He is a poor historian and did not give me much information about his prior \"leg injury\". I will bring him back with Dr. Castellon for follow up in November. I did explain the importance of taking Eliquis as ordered when he is released from SNF. I instructed patient to call sooner if needed.    "

## 2024-07-13 ENCOUNTER — HOSPITAL ENCOUNTER (EMERGENCY)
Facility: HOSPITAL | Age: 64
Discharge: HOME | End: 2024-07-14
Attending: EMERGENCY MEDICINE
Payer: MEDICAID

## 2024-07-13 DIAGNOSIS — F10.920 ALCOHOLIC INTOXICATION WITHOUT COMPLICATION (CMS-HCC): Primary | ICD-10-CM

## 2024-07-13 PROCEDURE — 99283 EMERGENCY DEPT VISIT LOW MDM: CPT

## 2024-07-13 ASSESSMENT — PAIN DESCRIPTION - LOCATION: LOCATION: GENERALIZED

## 2024-07-13 ASSESSMENT — COLUMBIA-SUICIDE SEVERITY RATING SCALE - C-SSRS
2. HAVE YOU ACTUALLY HAD ANY THOUGHTS OF KILLING YOURSELF?: NO
6. HAVE YOU EVER DONE ANYTHING, STARTED TO DO ANYTHING, OR PREPARED TO DO ANYTHING TO END YOUR LIFE?: NO
1. IN THE PAST MONTH, HAVE YOU WISHED YOU WERE DEAD OR WISHED YOU COULD GO TO SLEEP AND NOT WAKE UP?: NO

## 2024-07-13 ASSESSMENT — PAIN SCALES - GENERAL: PAINLEVEL_OUTOF10: 8

## 2024-07-13 ASSESSMENT — PAIN DESCRIPTION - PAIN TYPE: TYPE: CHRONIC PAIN

## 2024-07-13 ASSESSMENT — PAIN - FUNCTIONAL ASSESSMENT: PAIN_FUNCTIONAL_ASSESSMENT: 0-10

## 2024-07-14 VITALS
RESPIRATION RATE: 18 BRPM | BODY MASS INDEX: 27.09 KG/M2 | OXYGEN SATURATION: 94 % | SYSTOLIC BLOOD PRESSURE: 146 MMHG | HEIGHT: 72 IN | HEART RATE: 80 BPM | DIASTOLIC BLOOD PRESSURE: 81 MMHG | TEMPERATURE: 98.4 F | WEIGHT: 200 LBS

## 2024-07-14 NOTE — ED PROVIDER NOTES
Teddy García is a 63 y.o. patient presenting to the ED for alcohol intoxication.  The patient states that he has been drinking today.  He is not sure why but the police became involved.  He was brought here rather than going to long-term.  He states he feels achy all over but this is not new or different than how he always feels.  He denies any recent fever or illness.  He denies any concerns and is only asking for something to eat and drink.    Additional History Obtained from: None  Limitations to History: None  ------------------------------------------------------------------------------------------------------------------------------------------  Physical Exam:  Appearance: Alert, cooperative, smells of alcohol.  Skin: Warm, dry, appropriate color for ethnicity.  Eyes: Cornea clear. No scleral icterus or injection.   ENT: Mucous membranes moist.  Pulmonary: No accessory muscle use or stridor. Clear lung sounds bilaterally without rhonchi or wheezing.   Cardiac: Heart sounds regular without murmur. B/L radial pulses full and symmetric.   Abdomen: Soft, not tender.  No rebound or guarding.   Musculoskeletal: No gross deformities.   Neurological: Face symmetrical. Voice slurred but understandable. Appropriately conversant.  Moving all extremities equally.  Psychiatric: Appropriate mood and affect.    Medical Decision Making:  Chronic Medical Conditions Significantly Affecting Care:  has a past medical history of Alcohol abuse, in remission, Cancer (Multi), and CHF (congestive heart failure) (Multi).    Social Determinants of Health Significantly Affecting Care: Alcohol abuse, reports noncompliance with prescribed medications including Eliquis for PE diagnosed in June.    Differential Diagnosis Considered but not limited to: Patient with no complaints.  He states he is only here because he was given the option of coming here going to long-term.  He is noncompliant with his prescribed medications including Eliquis however  is not hypoxic, tachycardic, tachypneic, complaining of respiratory symptoms.  He does seem to understand the consequences of medication noncompliance and declines new prescriptions for this.      The patient will be monitored in the emergency department until clinically sober.  On reevaluation he is ambulatory with a steady gait.  He continues to deny any complaints.  He was discharged in stable condition.    Diagnoses as of 07/14/24 0640   Alcoholic intoxication without complication ([facility])            Dalia Arredondo,   07/14/24 0644

## 2024-07-14 NOTE — ED TRIAGE NOTES
Pt arrived via EMS c/o chest pain, pt currently states he has all over body pain and has had 3 large beers today.

## 2024-08-05 ENCOUNTER — TELEPHONE (OUTPATIENT)
Dept: PULMONOLOGY | Facility: HOSPITAL | Age: 64
End: 2024-08-05
Payer: MEDICAID

## 2024-08-05 NOTE — TELEPHONE ENCOUNTER
Patient is scheduled to see Dr. Csatellon on 11/26/24 and this appointment needs to be rescheduled due to Dr. Castellon not being in office on this day. Our office attempted to call the patient but we failed to find a legitimate phone number for him. We sent a letter to patients home asking him to call our office back to reschedule the appointment since it is now canceled. Will wait to hear from patient.

## 2024-08-21 ENCOUNTER — APPOINTMENT (OUTPATIENT)
Dept: RADIOLOGY | Facility: HOSPITAL | Age: 64
End: 2024-08-21
Payer: MEDICAID

## 2024-08-21 ENCOUNTER — HOSPITAL ENCOUNTER (INPATIENT)
Facility: HOSPITAL | Age: 64
Discharge: HOME | End: 2024-08-21
Attending: STUDENT IN AN ORGANIZED HEALTH CARE EDUCATION/TRAINING PROGRAM | Admitting: STUDENT IN AN ORGANIZED HEALTH CARE EDUCATION/TRAINING PROGRAM
Payer: MEDICAID

## 2024-08-21 ENCOUNTER — APPOINTMENT (OUTPATIENT)
Dept: CARDIOLOGY | Facility: HOSPITAL | Age: 64
End: 2024-08-21
Payer: MEDICAID

## 2024-08-21 DIAGNOSIS — F10.930 ALCOHOL WITHDRAWAL SYNDROME WITHOUT COMPLICATION (MULTI): Primary | ICD-10-CM

## 2024-08-21 DIAGNOSIS — Z74.09 IMPAIRED MOBILITY AND ADLS: ICD-10-CM

## 2024-08-21 DIAGNOSIS — Z78.9 IMPAIRED MOBILITY AND ADLS: ICD-10-CM

## 2024-08-21 PROBLEM — Z86.711 HISTORY OF PULMONARY EMBOLISM: Chronic | Status: ACTIVE | Noted: 2024-08-21

## 2024-08-21 PROBLEM — F41.9 ANXIETY DISORDER, UNSPECIFIED: Chronic | Status: ACTIVE | Noted: 2017-11-17

## 2024-08-21 PROBLEM — E87.6 HYPOKALEMIA: Status: ACTIVE | Noted: 2024-08-21

## 2024-08-21 PROBLEM — Z79.01 ON APIXABAN THERAPY: Chronic | Status: ACTIVE | Noted: 2024-08-21

## 2024-08-21 PROBLEM — K21.9 GERD (GASTROESOPHAGEAL REFLUX DISEASE): Status: ACTIVE | Noted: 2024-08-21

## 2024-08-21 PROBLEM — F43.22 ADJUSTMENT DISORDER WITH ANXIETY: Status: ACTIVE | Noted: 2017-11-17

## 2024-08-21 PROBLEM — Z99.89 USE OF CANE AS AMBULATORY AID: Chronic | Status: ACTIVE | Noted: 2024-08-21

## 2024-08-21 PROBLEM — H54.40 BLINDNESS OF RIGHT EYE: Status: ACTIVE | Noted: 2024-08-21

## 2024-08-21 PROBLEM — R26.81 GAIT INSTABILITY: Chronic | Status: ACTIVE | Noted: 2024-08-21

## 2024-08-21 PROBLEM — F33.2 MAJOR DEPRESSIVE DISORDER, RECURRENT SEVERE WITHOUT PSYCHOTIC FEATURES (MULTI): Status: ACTIVE | Noted: 2017-11-17

## 2024-08-21 PROBLEM — F17.200 NICOTINE DEPENDENCE, UNSPECIFIED, UNCOMPLICATED: Status: ACTIVE | Noted: 2017-11-17

## 2024-08-21 PROBLEM — R53.1 GENERALIZED WEAKNESS: Status: ACTIVE | Noted: 2024-08-21

## 2024-08-21 PROBLEM — F31.30 BIPOLAR DISORDER, MOST RECENT EPISODE DEPRESSED (MULTI): Chronic | Status: ACTIVE | Noted: 2024-01-14

## 2024-08-21 PROBLEM — F41.8 DEPRESSION WITH ANXIETY: Chronic | Status: ACTIVE | Noted: 2023-03-07

## 2024-08-21 LAB
ALBUMIN SERPL BCP-MCNC: 3.7 G/DL (ref 3.4–5)
ALP SERPL-CCNC: 68 U/L (ref 33–136)
ALT SERPL W P-5'-P-CCNC: 11 U/L (ref 10–52)
ANION GAP SERPL CALC-SCNC: 8 MMOL/L (ref 10–20)
AST SERPL W P-5'-P-CCNC: 19 U/L (ref 9–39)
BILIRUB SERPL-MCNC: 0.6 MG/DL (ref 0–1.2)
BNP SERPL-MCNC: 15 PG/ML (ref 0–99)
BUN SERPL-MCNC: 5 MG/DL (ref 6–23)
CALCIUM SERPL-MCNC: 9.1 MG/DL (ref 8.6–10.3)
CHLORIDE SERPL-SCNC: 101 MMOL/L (ref 98–107)
CO2 SERPL-SCNC: 27 MMOL/L (ref 21–32)
CREAT SERPL-MCNC: 0.53 MG/DL (ref 0.5–1.3)
EGFRCR SERPLBLD CKD-EPI 2021: >90 ML/MIN/1.73M*2
ERYTHROCYTE [DISTWIDTH] IN BLOOD BY AUTOMATED COUNT: 14.5 % (ref 11.5–14.5)
ETHANOL SERPL-MCNC: 50 MG/DL
GLUCOSE BLD MANUAL STRIP-MCNC: 130 MG/DL (ref 74–99)
GLUCOSE SERPL-MCNC: 118 MG/DL (ref 74–99)
HCT VFR BLD AUTO: 41.9 % (ref 41–52)
HGB BLD-MCNC: 14.4 G/DL (ref 13.5–17.5)
MCH RBC QN AUTO: 31.1 PG (ref 26–34)
MCHC RBC AUTO-ENTMCNC: 34.4 G/DL (ref 32–36)
MCV RBC AUTO: 91 FL (ref 80–100)
NRBC BLD-RTO: 0 /100 WBCS (ref 0–0)
PLATELET # BLD AUTO: 207 X10*3/UL (ref 150–450)
POTASSIUM SERPL-SCNC: 3.3 MMOL/L (ref 3.5–5.3)
PROT SERPL-MCNC: 8.4 G/DL (ref 6.4–8.2)
RBC # BLD AUTO: 4.63 X10*6/UL (ref 4.5–5.9)
SODIUM SERPL-SCNC: 133 MMOL/L (ref 136–145)
WBC # BLD AUTO: 4.4 X10*3/UL (ref 4.4–11.3)

## 2024-08-21 PROCEDURE — 2500000001 HC RX 250 WO HCPCS SELF ADMINISTERED DRUGS (ALT 637 FOR MEDICARE OP): Performed by: STUDENT IN AN ORGANIZED HEALTH CARE EDUCATION/TRAINING PROGRAM

## 2024-08-21 PROCEDURE — 70450 CT HEAD/BRAIN W/O DYE: CPT

## 2024-08-21 PROCEDURE — 80053 COMPREHEN METABOLIC PANEL: CPT | Performed by: STUDENT IN AN ORGANIZED HEALTH CARE EDUCATION/TRAINING PROGRAM

## 2024-08-21 PROCEDURE — 93005 ELECTROCARDIOGRAM TRACING: CPT

## 2024-08-21 PROCEDURE — 85027 COMPLETE CBC AUTOMATED: CPT | Performed by: STUDENT IN AN ORGANIZED HEALTH CARE EDUCATION/TRAINING PROGRAM

## 2024-08-21 PROCEDURE — 82077 ASSAY SPEC XCP UR&BREATH IA: CPT | Performed by: STUDENT IN AN ORGANIZED HEALTH CARE EDUCATION/TRAINING PROGRAM

## 2024-08-21 PROCEDURE — 2500000002 HC RX 250 W HCPCS SELF ADMINISTERED DRUGS (ALT 637 FOR MEDICARE OP, ALT 636 FOR OP/ED): Performed by: STUDENT IN AN ORGANIZED HEALTH CARE EDUCATION/TRAINING PROGRAM

## 2024-08-21 PROCEDURE — S4991 NICOTINE PATCH NONLEGEND: HCPCS | Performed by: STUDENT IN AN ORGANIZED HEALTH CARE EDUCATION/TRAINING PROGRAM

## 2024-08-21 PROCEDURE — 99285 EMERGENCY DEPT VISIT HI MDM: CPT

## 2024-08-21 PROCEDURE — 1200000002 HC GENERAL ROOM WITH TELEMETRY DAILY

## 2024-08-21 PROCEDURE — 82947 ASSAY GLUCOSE BLOOD QUANT: CPT

## 2024-08-21 PROCEDURE — 83880 ASSAY OF NATRIURETIC PEPTIDE: CPT | Performed by: STUDENT IN AN ORGANIZED HEALTH CARE EDUCATION/TRAINING PROGRAM

## 2024-08-21 PROCEDURE — 70450 CT HEAD/BRAIN W/O DYE: CPT | Performed by: RADIOLOGY

## 2024-08-21 PROCEDURE — 99223 1ST HOSP IP/OBS HIGH 75: CPT | Performed by: STUDENT IN AN ORGANIZED HEALTH CARE EDUCATION/TRAINING PROGRAM

## 2024-08-21 PROCEDURE — 36415 COLL VENOUS BLD VENIPUNCTURE: CPT | Performed by: STUDENT IN AN ORGANIZED HEALTH CARE EDUCATION/TRAINING PROGRAM

## 2024-08-21 RX ORDER — POTASSIUM CHLORIDE 750 MG/1
20 TABLET, FILM COATED, EXTENDED RELEASE ORAL ONCE
Status: COMPLETED | OUTPATIENT
Start: 2024-08-21 | End: 2024-08-21

## 2024-08-21 RX ORDER — IBUPROFEN 200 MG
1 TABLET ORAL EVERY 24 HOURS
Status: DISCONTINUED | OUTPATIENT
Start: 2024-08-21 | End: 2024-08-26 | Stop reason: HOSPADM

## 2024-08-21 RX ORDER — POLYETHYLENE GLYCOL 3350 17 G/17G
17 POWDER, FOR SOLUTION ORAL DAILY
Status: DISCONTINUED | OUTPATIENT
Start: 2024-08-22 | End: 2024-08-26 | Stop reason: HOSPADM

## 2024-08-21 RX ORDER — LORAZEPAM 2 MG/ML
1 INJECTION INTRAMUSCULAR EVERY 2 HOUR PRN
Status: DISCONTINUED | OUTPATIENT
Start: 2024-08-21 | End: 2024-08-25

## 2024-08-21 RX ORDER — PHENOBARBITAL SODIUM 130 MG/ML
130 INJECTION, SOLUTION INTRAMUSCULAR; INTRAVENOUS
Status: DISCONTINUED | OUTPATIENT
Start: 2024-08-21 | End: 2024-08-21

## 2024-08-21 RX ORDER — PANTOPRAZOLE SODIUM 40 MG/10ML
40 INJECTION, POWDER, LYOPHILIZED, FOR SOLUTION INTRAVENOUS
Status: DISCONTINUED | OUTPATIENT
Start: 2024-08-22 | End: 2024-08-26 | Stop reason: HOSPADM

## 2024-08-21 RX ORDER — GUAIFENESIN 600 MG/1
600 TABLET, EXTENDED RELEASE ORAL EVERY 12 HOURS PRN
Status: DISCONTINUED | OUTPATIENT
Start: 2024-08-21 | End: 2024-08-26 | Stop reason: HOSPADM

## 2024-08-21 RX ORDER — FOLIC ACID 1 MG/1
1 TABLET ORAL DAILY
Status: DISCONTINUED | OUTPATIENT
Start: 2024-08-22 | End: 2024-08-26 | Stop reason: HOSPADM

## 2024-08-21 RX ORDER — FOLIC ACID 1 MG/1
1 TABLET ORAL ONCE
Status: COMPLETED | OUTPATIENT
Start: 2024-08-21 | End: 2024-08-21

## 2024-08-21 RX ORDER — LORAZEPAM 2 MG/ML
0.5 INJECTION INTRAMUSCULAR EVERY 2 HOUR PRN
Status: DISCONTINUED | OUTPATIENT
Start: 2024-08-21 | End: 2024-08-25

## 2024-08-21 RX ORDER — ACETAMINOPHEN 325 MG/1
650 TABLET ORAL EVERY 4 HOURS PRN
Status: DISCONTINUED | OUTPATIENT
Start: 2024-08-21 | End: 2024-08-26 | Stop reason: HOSPADM

## 2024-08-21 RX ORDER — OLANZAPINE 5 MG/1
5 TABLET ORAL 2 TIMES DAILY
Status: DISCONTINUED | OUTPATIENT
Start: 2024-08-21 | End: 2024-08-26 | Stop reason: HOSPADM

## 2024-08-21 RX ORDER — PANTOPRAZOLE SODIUM 40 MG/1
40 TABLET, DELAYED RELEASE ORAL
Status: DISCONTINUED | OUTPATIENT
Start: 2024-08-22 | End: 2024-08-26 | Stop reason: HOSPADM

## 2024-08-21 RX ORDER — TALC
3 POWDER (GRAM) TOPICAL NIGHTLY PRN
Status: DISCONTINUED | OUTPATIENT
Start: 2024-08-21 | End: 2024-08-26 | Stop reason: HOSPADM

## 2024-08-21 RX ORDER — METFORMIN HYDROCHLORIDE 500 MG/1
500 TABLET ORAL
COMMUNITY
Start: 2024-08-12

## 2024-08-21 RX ORDER — THIAMINE HYDROCHLORIDE 100 MG/ML
100 INJECTION, SOLUTION INTRAMUSCULAR; INTRAVENOUS DAILY
Status: COMPLETED | OUTPATIENT
Start: 2024-08-22 | End: 2024-08-24

## 2024-08-21 RX ORDER — ESCITALOPRAM OXALATE 10 MG/1
20 TABLET ORAL DAILY
Status: DISCONTINUED | OUTPATIENT
Start: 2024-08-22 | End: 2024-08-26 | Stop reason: HOSPADM

## 2024-08-21 RX ORDER — BISACODYL 10 MG/1
10 SUPPOSITORY RECTAL DAILY PRN
Status: DISCONTINUED | OUTPATIENT
Start: 2024-08-21 | End: 2024-08-26 | Stop reason: HOSPADM

## 2024-08-21 RX ORDER — MULTIVIT-MIN/IRON FUM/FOLIC AC 7.5 MG-4
1 TABLET ORAL DAILY
Status: DISCONTINUED | OUTPATIENT
Start: 2024-08-22 | End: 2024-08-26 | Stop reason: HOSPADM

## 2024-08-21 RX ORDER — LANOLIN ALCOHOL/MO/W.PET/CERES
100 CREAM (GRAM) TOPICAL ONCE
Status: COMPLETED | OUTPATIENT
Start: 2024-08-21 | End: 2024-08-21

## 2024-08-21 RX ORDER — LANOLIN ALCOHOL/MO/W.PET/CERES
100 CREAM (GRAM) TOPICAL DAILY
Status: DISCONTINUED | OUTPATIENT
Start: 2024-08-22 | End: 2024-08-26 | Stop reason: HOSPADM

## 2024-08-21 RX ORDER — ONDANSETRON HYDROCHLORIDE 2 MG/ML
4 INJECTION, SOLUTION INTRAVENOUS EVERY 8 HOURS PRN
Status: DISCONTINUED | OUTPATIENT
Start: 2024-08-21 | End: 2024-08-26 | Stop reason: HOSPADM

## 2024-08-21 RX ORDER — ONDANSETRON 4 MG/1
4 TABLET, FILM COATED ORAL EVERY 8 HOURS PRN
Status: DISCONTINUED | OUTPATIENT
Start: 2024-08-21 | End: 2024-08-26 | Stop reason: HOSPADM

## 2024-08-21 RX ORDER — BISACODYL 5 MG
10 TABLET, DELAYED RELEASE (ENTERIC COATED) ORAL DAILY PRN
Status: DISCONTINUED | OUTPATIENT
Start: 2024-08-21 | End: 2024-08-26 | Stop reason: HOSPADM

## 2024-08-21 RX ORDER — TRAZODONE HYDROCHLORIDE 50 MG/1
100 TABLET ORAL NIGHTLY
Status: DISCONTINUED | OUTPATIENT
Start: 2024-08-21 | End: 2024-08-26 | Stop reason: HOSPADM

## 2024-08-21 RX ORDER — HYDROXYZINE HYDROCHLORIDE 25 MG/1
50 TABLET, FILM COATED ORAL 2 TIMES DAILY PRN
Status: DISCONTINUED | OUTPATIENT
Start: 2024-08-21 | End: 2024-08-26 | Stop reason: HOSPADM

## 2024-08-21 RX ORDER — HYDROXYZINE HYDROCHLORIDE 50 MG/1
50 TABLET, FILM COATED ORAL 2 TIMES DAILY PRN
Status: ON HOLD | COMMUNITY
End: 2024-08-22 | Stop reason: ENTERED-IN-ERROR

## 2024-08-21 SDOH — SOCIAL STABILITY: SOCIAL INSECURITY: HAVE YOU HAD THOUGHTS OF HARMING ANYONE ELSE?: NO

## 2024-08-21 SDOH — SOCIAL STABILITY: SOCIAL INSECURITY: WERE YOU ABLE TO COMPLETE ALL THE BEHAVIORAL HEALTH SCREENINGS?: YES

## 2024-08-21 ASSESSMENT — PATIENT HEALTH QUESTIONNAIRE - PHQ9
1. LITTLE INTEREST OR PLEASURE IN DOING THINGS: NOT AT ALL
SUM OF ALL RESPONSES TO PHQ9 QUESTIONS 1 & 2: 0
2. FEELING DOWN, DEPRESSED OR HOPELESS: NOT AT ALL

## 2024-08-21 ASSESSMENT — ENCOUNTER SYMPTOMS
ABDOMINAL PAIN: 0
SPEECH DIFFICULTY: 0
PALPITATIONS: 0
DIARRHEA: 0
LIGHT-HEADEDNESS: 0
ABDOMINAL DISTENTION: 0
ANAL BLEEDING: 0
CARDIOVASCULAR NEGATIVE: 1
NECK PAIN: 0
CONFUSION: 0
COUGH: 1
HEMATURIA: 0
WEAKNESS: 0
CONSTIPATION: 0
SLEEP DISTURBANCE: 0
SEIZURES: 0
RECTAL PAIN: 0
HEADACHES: 0
MUSCULOSKELETAL NEGATIVE: 1
DYSURIA: 0
DYSPHORIC MOOD: 1
COLOR CHANGE: 1
GASTROINTESTINAL NEGATIVE: 1
POLYDIPSIA: 0
BLOOD IN STOOL: 0
BACK PAIN: 1
APPETITE CHANGE: 1
WEAKNESS: 1
TREMORS: 1
NECK STIFFNESS: 0
FATIGUE: 1
MYALGIAS: 1
PHOTOPHOBIA: 0
NAUSEA: 0
VOMITING: 0
FEVER: 0
FACIAL ASYMMETRY: 0
DIZZINESS: 0
SHORTNESS OF BREATH: 0
LIGHT-HEADEDNESS: 1
RESPIRATORY NEGATIVE: 1
NUMBNESS: 0
CHILLS: 0

## 2024-08-21 ASSESSMENT — LIFESTYLE VARIABLES
VISUAL DISTURBANCES: NOT PRESENT
EVER FELT BAD OR GUILTY ABOUT YOUR DRINKING: YES
ANXIETY: NO ANXIETY, AT EASE
SKIP TO QUESTIONS 9-10: 0
NAUSEA AND VOMITING: MILD NAUSEA WITH NO VOMITING
BLOOD PRESSURE: 116/86
AGITATION: NORMAL ACTIVITY
ANXIETY: MILDLY ANXIOUS
TOTAL SCORE: 7
HOW OFTEN DO YOU HAVE A DRINK CONTAINING ALCOHOL: 4 OR MORE TIMES A WEEK
BLOOD PRESSURE: 132/75
TOTAL SCORE: 5
HEADACHE, FULLNESS IN HEAD: MILD
NAUSEA AND VOMITING: NO NAUSEA AND NO VOMITING
AGITATION: NORMAL ACTIVITY
TOTAL SCORE: 1
AUDITORY DISTURBANCES: NOT PRESENT
AGITATION: NORMAL ACTIVITY
HOW OFTEN DURING THE LAST YEAR HAVE YOU NEEDED AN ALCOHOLIC DRINK FIRST THING IN THE MORNING TO GET YOURSELF GOING AFTER A NIGHT OF HEAVY DRINKING: WEEKLY
PAROXYSMAL SWEATS: BARELY PERCEPTIBLE SWEATING, PALMS MOIST
AUDITORY DISTURBANCES: NOT PRESENT
TREMOR: NO TREMOR
SUBSTANCE_ABUSE_PAST_12_MONTHS: NO
VISUAL DISTURBANCES: NOT PRESENT
AUDITORY DISTURBANCES: NOT PRESENT
PULSE: 71
HOW OFTEN DURING THE LAST YEAR HAVE YOU BEEN UNABLE TO REMEMBER WHAT HAPPENED THE NIGHT BEFORE BECAUSE YOU HAD BEEN DRINKING: WEEKLY
HAVE YOU OR SOMEONE ELSE BEEN INJURED AS A RESULT OF YOUR DRINKING: NO
AUDIT-C TOTAL SCORE: 12
VISUAL DISTURBANCES: NOT PRESENT
VISUAL DISTURBANCES: NOT PRESENT
HOW OFTEN DURING THE LAST YEAR HAVE YOU FOUND THAT YOU WERE NOT ABLE TO STOP DRINKING ONCE YOU HAD STARTED: WEEKLY
PAROXYSMAL SWEATS: 3
PULSE: 91
ANXIETY: NO ANXIETY, AT EASE
EVER HAD A DRINK FIRST THING IN THE MORNING TO STEADY YOUR NERVES TO GET RID OF A HANGOVER: YES
HEADACHE, FULLNESS IN HEAD: NOT PRESENT
HOW MANY STANDARD DRINKS CONTAINING ALCOHOL DO YOU HAVE ON A TYPICAL DAY: 10 OR MORE
ORIENTATION AND CLOUDING OF SENSORIUM: ORIENTED AND CAN DO SERIAL ADDITIONS
AUDIT TOTAL SCORE: 27
AUDIT-C TOTAL SCORE: 12
BLOOD PRESSURE: 149/95
TREMOR: 2
ORIENTATION AND CLOUDING OF SENSORIUM: ORIENTED AND CAN DO SERIAL ADDITIONS
HOW OFTEN DURING THE LAST YEAR HAVE YOU HAD A FEELING OF GUILT OR REMORSE AFTER DRINKING: WEEKLY
ANXIETY: MILDLY ANXIOUS
TREMOR: NO TREMOR
ORIENTATION AND CLOUDING OF SENSORIUM: CANNOT DO SERIAL ADDITIONS OR IS UNCERTAIN ABOUT DATE
NAUSEA AND VOMITING: MILD NAUSEA WITH NO VOMITING
PAROXYSMAL SWEATS: BARELY PERCEPTIBLE SWEATING, PALMS MOIST
ORIENTATION AND CLOUDING OF SENSORIUM: CANNOT DO SERIAL ADDITIONS OR IS UNCERTAIN ABOUT DATE
TREMOR: NO TREMOR
PULSE: 80
NAUSEA AND VOMITING: NO NAUSEA AND NO VOMITING
PULSE: 96
HAVE PEOPLE ANNOYED YOU BY CRITICIZING YOUR DRINKING: YES
HOW OFTEN DURING THE LAST YEAR HAVE YOU FAILED TO DO WHAT WAS NORMALLY EXPECTED FROM YOU BECAUSE OF DRINKING: WEEKLY
PRESCIPTION_ABUSE_PAST_12_MONTHS: NO
HEADACHE, FULLNESS IN HEAD: NOT PRESENT
TOTAL SCORE: 1
ORIENTATION AND CLOUDING OF SENSORIUM: ORIENTED AND CAN DO SERIAL ADDITIONS
HEADACHE, FULLNESS IN HEAD: NOT PRESENT
ORIENTATION AND CLOUDING OF SENSORIUM: CANNOT DO SERIAL ADDITIONS OR IS UNCERTAIN ABOUT DATE
BLOOD PRESSURE: 129/63
TOTAL SCORE: 4
HAS A RELATIVE, FRIEND, DOCTOR, OR ANOTHER HEALTH PROFESSIONAL EXPRESSED CONCERN ABOUT YOUR DRINKING OR SUGGESTED YOU CUT DOWN: NO
PAROXYSMAL SWEATS: NO SWEAT VISIBLE
VISUAL DISTURBANCES: NOT PRESENT
VISUAL DISTURBANCES: NOT PRESENT
HAVE YOU EVER FELT YOU SHOULD CUT DOWN ON YOUR DRINKING: YES
HOW OFTEN DO YOU HAVE 6 OR MORE DRINKS ON ONE OCCASION: DAILY OR ALMOST DAILY
TREMOR: NOT VISIBLE, BUT CAN BE FELT FINGERTIP TO FINGERTIP
TOTAL SCORE: 3
ANXIETY: NO ANXIETY, AT EASE
AUDITORY DISTURBANCES: NOT PRESENT
AUDITORY DISTURBANCES: NOT PRESENT
NAUSEA AND VOMITING: NO NAUSEA AND NO VOMITING
ANXIETY: NO ANXIETY, AT EASE
AUDIT TOTAL SCORE: 15
PAROXYSMAL SWEATS: NO SWEAT VISIBLE
AUDITORY DISTURBANCES: NOT PRESENT
PAROXYSMAL SWEATS: NO SWEAT VISIBLE
NAUSEA AND VOMITING: NO NAUSEA AND NO VOMITING
TOTAL SCORE: 2
AGITATION: NORMAL ACTIVITY
TREMOR: 2
AGITATION: NORMAL ACTIVITY
AGITATION: NORMAL ACTIVITY
HEADACHE, FULLNESS IN HEAD: NOT PRESENT
HEADACHE, FULLNESS IN HEAD: NOT PRESENT

## 2024-08-21 ASSESSMENT — COGNITIVE AND FUNCTIONAL STATUS - GENERAL
MOBILITY SCORE: 22
DAILY ACTIVITIY SCORE: 24
CLIMB 3 TO 5 STEPS WITH RAILING: A LITTLE
PATIENT BASELINE BEDBOUND: NO
WALKING IN HOSPITAL ROOM: A LITTLE

## 2024-08-21 ASSESSMENT — ACTIVITIES OF DAILY LIVING (ADL)
ADEQUATE_TO_COMPLETE_ADL: YES
BATHING: INDEPENDENT
FEEDING YOURSELF: INDEPENDENT
LACK_OF_TRANSPORTATION: NO
HEARING - RIGHT EAR: FUNCTIONAL
TOILETING: INDEPENDENT
HEARING - LEFT EAR: FUNCTIONAL
DRESSING YOURSELF: INDEPENDENT
GROOMING: INDEPENDENT
JUDGMENT_ADEQUATE_SAFELY_COMPLETE_DAILY_ACTIVITIES: YES
WALKS IN HOME: INDEPENDENT
ASSISTIVE_DEVICE: WALKER
PATIENT'S MEMORY ADEQUATE TO SAFELY COMPLETE DAILY ACTIVITIES?: YES

## 2024-08-21 ASSESSMENT — PAIN - FUNCTIONAL ASSESSMENT: PAIN_FUNCTIONAL_ASSESSMENT: 0-10

## 2024-08-21 ASSESSMENT — COLUMBIA-SUICIDE SEVERITY RATING SCALE - C-SSRS
2. HAVE YOU ACTUALLY HAD ANY THOUGHTS OF KILLING YOURSELF?: NO
1. IN THE PAST MONTH, HAVE YOU WISHED YOU WERE DEAD OR WISHED YOU COULD GO TO SLEEP AND NOT WAKE UP?: NO
6. HAVE YOU EVER DONE ANYTHING, STARTED TO DO ANYTHING, OR PREPARED TO DO ANYTHING TO END YOUR LIFE?: NO

## 2024-08-21 ASSESSMENT — PAIN SCALES - GENERAL
PAINLEVEL_OUTOF10: 0 - NO PAIN
PAINLEVEL_OUTOF10: 0 - NO PAIN

## 2024-08-21 NOTE — DISCHARGE INSTRUCTIONS
You were seen in the emergency department for concern for alcohol withdrawal.  You were accepted by our detox facility and will be transported there from the emergency department.  When you are discharged, please call your primary care provider for follow-up in the next few days.  Please take all medications as prescribed.    Please return to the emergency department with any worsening symptoms including seizures, tremors, chest pain, feelings of your heart is racing, shortness of breath, or other concerns.

## 2024-08-21 NOTE — ED PROVIDER NOTES
"[facility] EMERGENCY DEPARTMENT ENCOUNTER    Pt Name:Teddy García  MRN: [id number]  Birthdate 1960  Date of evaluation: 08/21/24  PCP:  Ericka Rosado, PhD    CHIEF COMPLAINT       Chief Complaint   Patient presents with    Delirium Tremens (DTS)     Last drink was this morning 0800. Pt had a pint of whiskey throughout the night       HISTORY OF PRESENT ILLNESS  (Location/Symptom, Timing/Onset, Context/Setting, Quality, Duration, Modifying Factors, Severity.)      Teddy García is a 63 y.o. male who presents with concern for alcohol withdrawal.  Patient reports he was \"picked up by EMS\" earlier today because he was \"acting weirdly I guess\".  He states he currently feels lightheaded.  He states he is a daily alcohol drinker, states he usually drinks at least a pint of whiskey daily.  His last drink was this morning around 0800.  He states he does take Eliquis for a history of multiple pulmonary embolisms.  He reports he missed his dose of Eliquis this morning but has otherwise taken it daily.  He states he does not believe he fell or hit his head, but he is not certain.  He reports he does have a history of alcohol withdrawal when stopping drinking, but does not believe he has had any seizures in the past.  He reports he is interested in detox at this time and would like to stop drinking.  He denies chest pain, palpitations, changes in vision, seizure, abdominal pain, nausea, vomiting, trauma or injuries, numbness, tingling, weakness.    PAST MEDICAL / SURGICAL / SOCIAL / FAMILY HISTORY      has a past medical history of Alcohol abuse, in remission, Cancer (Multi), and CHF (congestive heart failure) (Multi).       has a past surgical history that includes Other surgical history (08/26/2019).      Social History     Socioeconomic History    Marital status: Single     Spouse name: Not on file    Number of children: Not on file    Years of education: Not on file    Highest education level: Not on file "   Occupational History    Not on file   Tobacco Use    Smoking status: Every Day     Current packs/day: 1.00     Types: Cigarettes    Smokeless tobacco: Never   Vaping Use    Vaping status: Never Used   Substance and Sexual Activity    Alcohol use: Yes     Alcohol/week: 6.0 standard drinks of alcohol     Types: 6 Cans of beer per week     Comment: pint of whiskey    Drug use: Not Currently     Types: Marijuana    Sexual activity: Not on file   Other Topics Concern    Not on file   Social History Narrative    Not on file     Social Determinants of Health     Financial Resource Strain: Low Risk  (6/5/2024)    Overall Financial Resource Strain (CARDIA)     Difficulty of Paying Living Expenses: Not hard at all   Food Insecurity: Not on file   Transportation Needs: No Transportation Needs (6/5/2024)    PRAPARE - Transportation     Lack of Transportation (Medical): No     Lack of Transportation (Non-Medical): No   Physical Activity: Not on file   Stress: Not on file   Social Connections: Not on file   Intimate Partner Violence: Not on file   Housing Stability: Low Risk  (6/5/2024)    Housing Stability Vital Sign     Unable to Pay for Housing in the Last Year: No     Number of Places Lived in the Last Year: 1     Unstable Housing in the Last Year: No       No family history on file.    Allergies:  Hydralazine    Home Medications:  Prior to Admission medications    Medication Sig Start Date End Date Taking? Authorizing Provider   acetaminophen (Tylenol) 325 mg tablet Take 2 tablets (650 mg) by mouth every 4 hours if needed for mild pain (1 - 3). 6/10/24   Stiven Pittman MD   apixaban (Eliquis) 5 mg tablet Take 1 tablet (5 mg) by mouth 2 times a day. 6/26/24 6/26/25  Adelia Neal APRN-CNP   cyclobenzaprine (Flexeril) 5 mg tablet Take 1 tablet (5 mg) by mouth 3 times a day as needed for muscle spasms. 6/10/24   Stiven Pitmtan MD   escitalopram (Lexapro) 20 mg tablet Take 1 tablet (20 mg) by mouth once daily.    Historical  "Provider, MD   folic acid (Folvite) 1 mg tablet Take 1 tablet (1 mg) by mouth once daily. 6/11/24   Stiven Pittman MD   lidocaine 4 % patch Place 1 patch over 12 hours on the skin once every 24 hours. Remove & discard patch within 12 hours or as directed by MD. 6/11/24   Stiven Pittman MD   multivitamin with minerals tablet Take 1 tablet by mouth once daily. 6/11/24   Stiven Pittman MD   nicotine (Nicoderm CQ) 14 mg/24 hr patch Place 1 patch over 24 hours on the skin once every 24 hours for 14 doses. 7/17/24 7/31/24  Stiven Pittman MD   nicotine (Nicoderm CQ) 21 mg/24 hr patch Place 1 patch over 24 hours on the skin once every 24 hours for 36 doses. 6/11/24 7/17/24  Stiven Pittman MD   nicotine (Nicoderm CQ) 7 mg/24 hr patch Place 1 patch over 24 hours on the skin once every 24 hours for 14 doses. 7/31/24 8/14/24  Stiven Pittman MD   OLANZapine (ZyPREXA) 5 mg tablet Take 1 tablet (5 mg) by mouth 2 times a day.    Historical Provider, MD   thiamine (Vitamin B-1) 100 mg tablet Take 1 tablet (100 mg) by mouth once daily. 6/11/24   Stiven Pittman MD   traZODone (Desyrel) 100 mg tablet Take 1 tablet (100 mg) by mouth once daily at bedtime. 1/14/24 2/13/24  Stiven Paul MD       REVIEW OF SYSTEMS       Review of Systems   HENT: Negative.     Respiratory: Negative.     Cardiovascular: Negative.    Gastrointestinal: Negative.    Musculoskeletal: Negative.    Neurological:  Positive for light-headedness. Negative for seizures, syncope (Patient unsure), weakness and numbness.   Psychiatric/Behavioral:  Negative for suicidal ideas.        PHYSICAL EXAM      INITIAL VITALS:   BP (!) 149/95   Pulse 91   Temp 36.4 °C (97.6 °F) (Temporal)   Resp 20   Ht 1.93 m (6' 4\")   Wt 97.5 kg (215 lb)   SpO2 97%   BMI 26.17 kg/m²     Physical Exam  Vitals reviewed.   HENT:      Head:      Comments: No hemotympanum, no garvin sign, no raccoon eyes     Mouth/Throat:      Mouth: Mucous membranes are dry.   Eyes:      Extraocular Movements: Extraocular movements " "intact.      Pupils: Pupils are equal, round, and reactive to light.   Cardiovascular:      Rate and Rhythm: Normal rate and regular rhythm.      Pulses: Normal pulses.   Pulmonary:      Effort: Pulmonary effort is normal.      Breath sounds: Normal breath sounds. No stridor. No wheezing, rhonchi or rales.   Abdominal:      Palpations: Abdomen is soft.      Tenderness: There is no abdominal tenderness. There is no guarding or rebound.   Musculoskeletal:         General: Normal range of motion.      Cervical back: Normal range of motion. No tenderness (No midline CTLS tenderness).   Neurological:      General: No focal deficit present.      Mental Status: He is alert and oriented to person, place, and time.      Sensory: No sensory deficit.      Motor: No weakness.           DDX/DIAGNOSTIC RESULTS / EMERGENCY DEPARTMENT COURSE / MDM     Medical Decision Making  63-year-old male presents with concern for alcohol withdrawal.  History of PE on Eliquis, EtOH abuse, CHF.  States last drink was 0800.  Reports he currently feels lightheaded and states that EMS said he was \"acting weird\".  Patient initially states he does not believe he fell or hit his head, then states he is not certain.  He is on anticoagulation, missed his dose this morning but reports otherwise taking his medication.  On exam, patient is alert and oriented.  Mildly slurred speech.  No focal neurologic deficits.  Pupils are equal and reactive bilaterally.  HEENT examination negative for hemotympanum, raccoon eyes, Young sign.  No midline CT LS tenderness.  Full ROM of bilateral upper and lower extremities.  Given patient's uncertainty about fall versus LOC and current anticoagulation, will obtain CT head.  He reports he is currently interested in detox.  Patient placed on seizure precautions and CIWA scale.  Will obtain point-of-care glucose.  Will give thiamine and folate.  At this time we will hold off on phenobarbital given the patient is currently on " Eliquis, and will put on as needed 0.5 or 1 mg every 2 hour Ativan per CIWA scale.      EMERGENCY DEPARTMENT COURSE:    ED Course as of 10/05/24 1400   Wed Aug 21, 2024   1420 Resting comfortably in bed, awake, alert. Seizure precautions in place. Will continue to monitor. [JG]   1901 Information faxed to [facility] per their request. Discussed case with [facility] as patient will need a wheelchair on discharge, he usually uses a rollator but EMS left rollator behind in his apartment. His apartment was left unlocked and his neighbor has an extra key for him if he needs it. [facility] working on obtaining wheelchair for patient at this time. [JG]   1930 Patient accepted by [facility], pending transport. [JG]      ED Course User Index  [JG] Pattie Mcpherson MD         Diagnoses as of 10/05/24 1400   Alcohol withdrawal syndrome without complication (Multi)   Impaired mobility and ADLs     CONSULTS:  None    FINAL IMPRESSION      1. Alcohol withdrawal syndrome without complication (Multi)          DISPOSITION / PLAN     08/21/24    PATIENT REFERRED TO:  [place] Rosado[place]  [id number] [facility]  [address] [place] [id number] [phone]    Schedule an appointment as soon as possible for a visit       [facility] Emergency Medicine  [address] [facility] [id number] [phone]  Go to   If symptoms worsen      DISCHARGE MEDICATIONS:  New Prescriptions    No medications on file       Pattie Mcpherson MD  Emergency Medicine Attending Physician    (Please note that portions of this note were completed with a voice recognition program.  Efforts were made to edit the dictations but occasionally words are mis-transcribed.)     Pattie Mcpherson MD  08/21/24 2001       Pattie Mcpherson MD  08/21/24 2038      ADDENDUM ADDED FOR EKG INTERPRETATION  EKG, interpreted by me: sinus rhythm with PACs, rate 94 bpm. Normal axis. No acute ST elevation or depression. No acute T wave  abnormalities.      Pattie Mcpherson MD  10/05/24 6911

## 2024-08-22 LAB
ALBUMIN SERPL BCP-MCNC: 3.4 G/DL (ref 3.4–5)
ALP SERPL-CCNC: 64 U/L (ref 33–136)
ALT SERPL W P-5'-P-CCNC: 13 U/L (ref 10–52)
ANION GAP SERPL CALC-SCNC: 13 MMOL/L (ref 10–20)
AST SERPL W P-5'-P-CCNC: 23 U/L (ref 9–39)
BASOPHILS # BLD AUTO: 0.04 X10*3/UL (ref 0–0.1)
BASOPHILS NFR BLD AUTO: 0.8 %
BILIRUB SERPL-MCNC: 0.8 MG/DL (ref 0–1.2)
BUN SERPL-MCNC: 9 MG/DL (ref 6–23)
CALCIUM SERPL-MCNC: 8.9 MG/DL (ref 8.6–10.3)
CHLORIDE SERPL-SCNC: 98 MMOL/L (ref 98–107)
CO2 SERPL-SCNC: 27 MMOL/L (ref 21–32)
CREAT SERPL-MCNC: 0.49 MG/DL (ref 0.5–1.3)
EGFRCR SERPLBLD CKD-EPI 2021: >90 ML/MIN/1.73M*2
EOSINOPHIL # BLD AUTO: 0.13 X10*3/UL (ref 0–0.7)
EOSINOPHIL NFR BLD AUTO: 2.7 %
ERYTHROCYTE [DISTWIDTH] IN BLOOD BY AUTOMATED COUNT: 14.7 % (ref 11.5–14.5)
GLUCOSE SERPL-MCNC: 124 MG/DL (ref 74–99)
HCT VFR BLD AUTO: 38 % (ref 41–52)
HGB BLD-MCNC: 13 G/DL (ref 13.5–17.5)
IMM GRANULOCYTES # BLD AUTO: 0.01 X10*3/UL (ref 0–0.7)
IMM GRANULOCYTES NFR BLD AUTO: 0.2 % (ref 0–0.9)
LYMPHOCYTES # BLD AUTO: 1.58 X10*3/UL (ref 1.2–4.8)
LYMPHOCYTES NFR BLD AUTO: 32.5 %
MAGNESIUM SERPL-MCNC: 1.35 MG/DL (ref 1.6–2.4)
MCH RBC QN AUTO: 31 PG (ref 26–34)
MCHC RBC AUTO-ENTMCNC: 34.2 G/DL (ref 32–36)
MCV RBC AUTO: 91 FL (ref 80–100)
MONOCYTES # BLD AUTO: 0.44 X10*3/UL (ref 0.1–1)
MONOCYTES NFR BLD AUTO: 9.1 %
NEUTROPHILS # BLD AUTO: 2.66 X10*3/UL (ref 1.2–7.7)
NEUTROPHILS NFR BLD AUTO: 54.7 %
NRBC BLD-RTO: 0 /100 WBCS (ref 0–0)
PLATELET # BLD AUTO: 209 X10*3/UL (ref 150–450)
POTASSIUM SERPL-SCNC: 3.4 MMOL/L (ref 3.5–5.3)
PROT SERPL-MCNC: 7.7 G/DL (ref 6.4–8.2)
RBC # BLD AUTO: 4.2 X10*6/UL (ref 4.5–5.9)
SODIUM SERPL-SCNC: 135 MMOL/L (ref 136–145)
WBC # BLD AUTO: 4.9 X10*3/UL (ref 4.4–11.3)

## 2024-08-22 PROCEDURE — 2500000004 HC RX 250 GENERAL PHARMACY W/ HCPCS (ALT 636 FOR OP/ED): Performed by: STUDENT IN AN ORGANIZED HEALTH CARE EDUCATION/TRAINING PROGRAM

## 2024-08-22 PROCEDURE — 99233 SBSQ HOSP IP/OBS HIGH 50: CPT | Performed by: INTERNAL MEDICINE

## 2024-08-22 PROCEDURE — 2500000002 HC RX 250 W HCPCS SELF ADMINISTERED DRUGS (ALT 637 FOR MEDICARE OP, ALT 636 FOR OP/ED): Performed by: INTERNAL MEDICINE

## 2024-08-22 PROCEDURE — 51701 INSERT BLADDER CATHETER: CPT

## 2024-08-22 PROCEDURE — 97165 OT EVAL LOW COMPLEX 30 MIN: CPT | Mod: GO | Performed by: OCCUPATIONAL THERAPIST

## 2024-08-22 PROCEDURE — 97162 PT EVAL MOD COMPLEX 30 MIN: CPT | Mod: GP

## 2024-08-22 PROCEDURE — 80053 COMPREHEN METABOLIC PANEL: CPT | Performed by: STUDENT IN AN ORGANIZED HEALTH CARE EDUCATION/TRAINING PROGRAM

## 2024-08-22 PROCEDURE — 85025 COMPLETE CBC W/AUTO DIFF WBC: CPT | Performed by: STUDENT IN AN ORGANIZED HEALTH CARE EDUCATION/TRAINING PROGRAM

## 2024-08-22 PROCEDURE — 83735 ASSAY OF MAGNESIUM: CPT | Performed by: STUDENT IN AN ORGANIZED HEALTH CARE EDUCATION/TRAINING PROGRAM

## 2024-08-22 PROCEDURE — 2500000002 HC RX 250 W HCPCS SELF ADMINISTERED DRUGS (ALT 637 FOR MEDICARE OP, ALT 636 FOR OP/ED): Performed by: STUDENT IN AN ORGANIZED HEALTH CARE EDUCATION/TRAINING PROGRAM

## 2024-08-22 PROCEDURE — 2500000001 HC RX 250 WO HCPCS SELF ADMINISTERED DRUGS (ALT 637 FOR MEDICARE OP): Performed by: STUDENT IN AN ORGANIZED HEALTH CARE EDUCATION/TRAINING PROGRAM

## 2024-08-22 PROCEDURE — 2500000004 HC RX 250 GENERAL PHARMACY W/ HCPCS (ALT 636 FOR OP/ED): Performed by: INTERNAL MEDICINE

## 2024-08-22 PROCEDURE — 1200000002 HC GENERAL ROOM WITH TELEMETRY DAILY

## 2024-08-22 PROCEDURE — 36415 COLL VENOUS BLD VENIPUNCTURE: CPT | Performed by: STUDENT IN AN ORGANIZED HEALTH CARE EDUCATION/TRAINING PROGRAM

## 2024-08-22 PROCEDURE — S4991 NICOTINE PATCH NONLEGEND: HCPCS | Performed by: STUDENT IN AN ORGANIZED HEALTH CARE EDUCATION/TRAINING PROGRAM

## 2024-08-22 PROCEDURE — 97110 THERAPEUTIC EXERCISES: CPT | Mod: GO | Performed by: OCCUPATIONAL THERAPIST

## 2024-08-22 RX ORDER — MAGNESIUM SULFATE HEPTAHYDRATE 40 MG/ML
4 INJECTION, SOLUTION INTRAVENOUS ONCE
Status: COMPLETED | OUTPATIENT
Start: 2024-08-22 | End: 2024-08-22

## 2024-08-22 RX ORDER — POTASSIUM CHLORIDE 750 MG/1
40 TABLET, FILM COATED, EXTENDED RELEASE ORAL ONCE
Status: COMPLETED | OUTPATIENT
Start: 2024-08-22 | End: 2024-08-22

## 2024-08-22 RX ORDER — HYDROXYZINE PAMOATE 50 MG/1
50 CAPSULE ORAL 2 TIMES DAILY
COMMUNITY

## 2024-08-22 ASSESSMENT — COGNITIVE AND FUNCTIONAL STATUS - GENERAL
HELP NEEDED FOR BATHING: TOTAL
TOILETING: TOTAL
HELP NEEDED FOR BATHING: TOTAL
MOVING FROM LYING ON BACK TO SITTING ON SIDE OF FLAT BED WITH BEDRAILS: A LOT
MOBILITY SCORE: 7
STANDING UP FROM CHAIR USING ARMS: TOTAL
EATING MEALS: A LITTLE
PERSONAL GROOMING: TOTAL
MOBILITY SCORE: 7
DAILY ACTIVITIY SCORE: 9
DAILY ACTIVITIY SCORE: 9
DRESSING REGULAR UPPER BODY CLOTHING: TOTAL
PERSONAL GROOMING: TOTAL
TOILETING: TOTAL
CLIMB 3 TO 5 STEPS WITH RAILING: TOTAL
DAILY ACTIVITIY SCORE: 9
MOVING TO AND FROM BED TO CHAIR: TOTAL
MOBILITY SCORE: 7
HELP NEEDED FOR BATHING: TOTAL
CLIMB 3 TO 5 STEPS WITH RAILING: TOTAL
MOVING FROM LYING ON BACK TO SITTING ON SIDE OF FLAT BED WITH BEDRAILS: A LOT
DRESSING REGULAR LOWER BODY CLOTHING: A LOT
MOVING FROM LYING ON BACK TO SITTING ON SIDE OF FLAT BED WITH BEDRAILS: A LOT
TURNING FROM BACK TO SIDE WHILE IN FLAT BAD: TOTAL
TURNING FROM BACK TO SIDE WHILE IN FLAT BAD: TOTAL
PERSONAL GROOMING: TOTAL
STANDING UP FROM CHAIR USING ARMS: TOTAL
WALKING IN HOSPITAL ROOM: TOTAL
WALKING IN HOSPITAL ROOM: TOTAL
TURNING FROM BACK TO SIDE WHILE IN FLAT BAD: TOTAL
WALKING IN HOSPITAL ROOM: TOTAL
DRESSING REGULAR UPPER BODY CLOTHING: TOTAL
MOVING TO AND FROM BED TO CHAIR: TOTAL
MOVING TO AND FROM BED TO CHAIR: TOTAL
EATING MEALS: A LITTLE
STANDING UP FROM CHAIR USING ARMS: TOTAL
DRESSING REGULAR LOWER BODY CLOTHING: TOTAL
TOILETING: TOTAL
EATING MEALS: A LITTLE
DRESSING REGULAR LOWER BODY CLOTHING: A LOT
DRESSING REGULAR UPPER BODY CLOTHING: A LOT
CLIMB 3 TO 5 STEPS WITH RAILING: TOTAL

## 2024-08-22 ASSESSMENT — LIFESTYLE VARIABLES
ANXIETY: NO ANXIETY, AT EASE
ORIENTATION AND CLOUDING OF SENSORIUM: CANNOT DO SERIAL ADDITIONS OR IS UNCERTAIN ABOUT DATE
ANXIETY: NO ANXIETY, AT EASE
AGITATION: NORMAL ACTIVITY
AUDITORY DISTURBANCES: NOT PRESENT
VISUAL DISTURBANCES: NOT PRESENT
NAUSEA AND VOMITING: MILD NAUSEA WITH NO VOMITING
PAROXYSMAL SWEATS: NO SWEAT VISIBLE
VISUAL DISTURBANCES: NOT PRESENT
HEADACHE, FULLNESS IN HEAD: NOT PRESENT
HEADACHE, FULLNESS IN HEAD: NOT PRESENT
AGITATION: NORMAL ACTIVITY
AUDITORY DISTURBANCES: NOT PRESENT
TOTAL SCORE: 1
TOTAL SCORE: 1
NAUSEA AND VOMITING: NO NAUSEA AND NO VOMITING
AUDITORY DISTURBANCES: NOT PRESENT
AUDITORY DISTURBANCES: NOT PRESENT
ANXIETY: NO ANXIETY, AT EASE
ORIENTATION AND CLOUDING OF SENSORIUM: CANNOT DO SERIAL ADDITIONS OR IS UNCERTAIN ABOUT DATE
VISUAL DISTURBANCES: NOT PRESENT
ANXIETY: NO ANXIETY, AT EASE
VISUAL DISTURBANCES: NOT PRESENT
PAROXYSMAL SWEATS: NO SWEAT VISIBLE
HEADACHE, FULLNESS IN HEAD: MILD
AUDITORY DISTURBANCES: NOT PRESENT
ORIENTATION AND CLOUDING OF SENSORIUM: CANNOT DO SERIAL ADDITIONS OR IS UNCERTAIN ABOUT DATE
AUDITORY DISTURBANCES: NOT PRESENT
ANXIETY: NO ANXIETY, AT EASE
HEADACHE, FULLNESS IN HEAD: NOT PRESENT
TREMOR: NO TREMOR
AGITATION: NORMAL ACTIVITY
TREMOR: NOT VISIBLE, BUT CAN BE FELT FINGERTIP TO FINGERTIP
AGITATION: NORMAL ACTIVITY
AUDITORY DISTURBANCES: NOT PRESENT
VISUAL DISTURBANCES: NOT PRESENT
ANXIETY: NO ANXIETY, AT EASE
AGITATION: NORMAL ACTIVITY
TOTAL SCORE: 1
TREMOR: NO TREMOR
AUDITORY DISTURBANCES: NOT PRESENT
AUDITORY DISTURBANCES: NOT PRESENT
ORIENTATION AND CLOUDING OF SENSORIUM: DISORIENTED FOR DATE BY NO MORE THAN 2 CALENDAR DAYS
PAROXYSMAL SWEATS: NO SWEAT VISIBLE
TREMOR: NO TREMOR
TREMOR: NO TREMOR
TOTAL SCORE: 1
NAUSEA AND VOMITING: NO NAUSEA AND NO VOMITING
HEADACHE, FULLNESS IN HEAD: NOT PRESENT
TOTAL SCORE: 9
TOTAL SCORE: 2
AUDITORY DISTURBANCES: NOT PRESENT
AGITATION: NORMAL ACTIVITY
HEADACHE, FULLNESS IN HEAD: NOT PRESENT
ORIENTATION AND CLOUDING OF SENSORIUM: CANNOT DO SERIAL ADDITIONS OR IS UNCERTAIN ABOUT DATE
ORIENTATION AND CLOUDING OF SENSORIUM: CANNOT DO SERIAL ADDITIONS OR IS UNCERTAIN ABOUT DATE
VISUAL DISTURBANCES: NOT PRESENT
TOTAL SCORE: 3
PAROXYSMAL SWEATS: NO SWEAT VISIBLE
AGITATION: NORMAL ACTIVITY
ORIENTATION AND CLOUDING OF SENSORIUM: DISORIENTED FOR DATE BY NO MORE THAN 2 CALENDAR DAYS
VISUAL DISTURBANCES: NOT PRESENT
PAROXYSMAL SWEATS: NO SWEAT VISIBLE
HEADACHE, FULLNESS IN HEAD: NOT PRESENT
NAUSEA AND VOMITING: NO NAUSEA AND NO VOMITING
HEADACHE, FULLNESS IN HEAD: NOT PRESENT
TREMOR: NO TREMOR
ANXIETY: NO ANXIETY, AT EASE
AGITATION: SOMEWHAT MORE THAN NORMAL ACTIVITY
ANXIETY: NO ANXIETY, AT EASE
VISUAL DISTURBANCES: NOT PRESENT
AGITATION: NORMAL ACTIVITY
AUDITORY DISTURBANCES: NOT PRESENT
NAUSEA AND VOMITING: NO NAUSEA AND NO VOMITING
AGITATION: NORMAL ACTIVITY
TREMOR: NO TREMOR
NAUSEA AND VOMITING: NO NAUSEA AND NO VOMITING
PAROXYSMAL SWEATS: NO SWEAT VISIBLE
TOTAL SCORE: 2
TOTAL SCORE: 2
VISUAL DISTURBANCES: NOT PRESENT
VISUAL DISTURBANCES: NOT PRESENT
PAROXYSMAL SWEATS: BARELY PERCEPTIBLE SWEATING, PALMS MOIST
AGITATION: NORMAL ACTIVITY
HEADACHE, FULLNESS IN HEAD: MODERATE
TOTAL SCORE: 1
TREMOR: NO TREMOR
HEADACHE, FULLNESS IN HEAD: NOT PRESENT
ANXIETY: NO ANXIETY, AT EASE
ORIENTATION AND CLOUDING OF SENSORIUM: CANNOT DO SERIAL ADDITIONS OR IS UNCERTAIN ABOUT DATE
PAROXYSMAL SWEATS: NO SWEAT VISIBLE
TOTAL SCORE: 1
AUDITORY DISTURBANCES: NOT PRESENT
NAUSEA AND VOMITING: NO NAUSEA AND NO VOMITING
ORIENTATION AND CLOUDING OF SENSORIUM: CANNOT DO SERIAL ADDITIONS OR IS UNCERTAIN ABOUT DATE
AGITATION: NORMAL ACTIVITY
NAUSEA AND VOMITING: NO NAUSEA AND NO VOMITING
PAROXYSMAL SWEATS: NO SWEAT VISIBLE
TREMOR: NO TREMOR
PAROXYSMAL SWEATS: NO SWEAT VISIBLE
NAUSEA AND VOMITING: NO NAUSEA AND NO VOMITING
ORIENTATION AND CLOUDING OF SENSORIUM: CANNOT DO SERIAL ADDITIONS OR IS UNCERTAIN ABOUT DATE
TOTAL SCORE: 2
HEADACHE, FULLNESS IN HEAD: NOT PRESENT
PAROXYSMAL SWEATS: NO SWEAT VISIBLE
ANXIETY: NO ANXIETY, AT EASE
ORIENTATION AND CLOUDING OF SENSORIUM: CANNOT DO SERIAL ADDITIONS OR IS UNCERTAIN ABOUT DATE
TREMOR: NO TREMOR
NAUSEA AND VOMITING: MILD NAUSEA WITH NO VOMITING
ORIENTATION AND CLOUDING OF SENSORIUM: DISORIENTED FOR DATE BY NO MORE THAN 2 CALENDAR DAYS
TREMOR: NO TREMOR
NAUSEA AND VOMITING: NO NAUSEA AND NO VOMITING
TREMOR: NO TREMOR
ANXIETY: NO ANXIETY, AT EASE
NAUSEA AND VOMITING: NO NAUSEA AND NO VOMITING
HEADACHE, FULLNESS IN HEAD: NOT PRESENT
VISUAL DISTURBANCES: NOT PRESENT
ANXIETY: MILDLY ANXIOUS
PAROXYSMAL SWEATS: NO SWEAT VISIBLE
VISUAL DISTURBANCES: NOT PRESENT

## 2024-08-22 ASSESSMENT — ACTIVITIES OF DAILY LIVING (ADL)
ADL_ASSISTANCE: INDEPENDENT
ADL_ASSISTANCE: INDEPENDENT

## 2024-08-22 ASSESSMENT — PAIN SCALES - GENERAL
PAINLEVEL_OUTOF10: 0 - NO PAIN
PAINLEVEL_OUTOF10: 0 - NO PAIN

## 2024-08-22 NOTE — PROGRESS NOTES
Teddy García is a 63 y.o. male on day 1 of admission presenting with Alcohol withdrawal syndrome without complication (Multi).      Subjective   Teddy García is a 63 y.o. male with PMHx s/f HTN (no longer on meds), prediabetes (A1c 5.8%), prior PE on Eliquis, prior OM of the L great toe s/p amputation, GERD, bipolar disorder, anxiety, depression, alcohol abuse abuse with h/o withdrawal seizures / DTs, tobacco use disorder, chronic gait instability (requires a cane at baseline), presenting with wishes to undergo treatment for alcohol abuse. Pt is very flat with his responses and a difficult historian. He reports he presented to the emergency department today with wishes to undergo treatment of his alcohol use disorder, not wanting to provide much additional insight into this. Reports he is still drinking about 1 pint of vodka daily and a six pack of beer, with his last drinking being in the morning today (08/21/2024). He was initially evaluated in the ED and discharged up to [place]; however, they brought him back down to the ED (his belongings were returned in bags during my exam) after they informed the emergency department that he was not able to participate in their services because he would need to be able to be ambulatory without assistive devices as they do not have the capabilities to assist with ambulation there. On my evaluation, the patient reports generalized weakness and aches / pains where are at baseline. He admits to falling on the way to his couch (got tripped up) a few days ago and that he scraped his knees in the process. He reports compliance with his Eliquis and the only missed dose was this morning d/t being in the ED. He denies CP/pressure, palpitations, SOB, HWANG, HA, vision changes, f/c/n/v/d/abd pain, focal and/or lateralizing sensory or motor deficits. He was resting comfortably watching TV in the ED.      ED Course (Summary):   Vitals on presentation: T 98.1, /86, HR 96 > 74, R  18, SpO2 96% RA  Labs: CBC without abnormalities. CMP with glucose 118, Na 133, K 3.3, BUN 5, sCr 0.53, LFTs without notable abnormalities. Alcohol 50 (from 1349 this afternoon).   Imaging: CTH - no acute process, evidence of prior infarcts (remote deep lacunar infarct involving the white matter of the right frontal lobe. 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 There is a small remote left cerebellar infarct.)   Interventions: 20mEq Kcl replacement and initial discharge to [facility]; followed by repeat evaluation + phenobarb 130IV x1, ativan IV per protocol, thiamine/mvi/folate, admitted to medicine      PMHx: As above   Surgical Hx: Tonsillectomy, ORIF left arm fracture, amputation of L great toe  Social Hx: Smoking 1ppd x decades; not motivated to quit. Drinking ~1 pint of vodka and 1 six pack of beer daily. Single, lives alone.   Family Hx: Reviewed and found to be noncontributory to presenting complaint    8/22- No acute events overnight. Patient sitting up in bed eating breakfast. Notable left sided weakness/nonuse. Would like to participate in a detox program for alcohol use disorder. Discussed case with SW and TCC in hope of finding him an appropriate program. PRN Ativan if CIWA score indicates. Will continue to supplement electrolytes.              Objective     Last Recorded Vitals  /81 (BP Location: Left arm, Patient Position: Lying)   Pulse 96   Temp 36.8 °C (98.3 °F) (Temporal)   Resp 17   Wt 97.5 kg (215 lb)   SpO2 92%   Intake/Output last 3 Shifts:    Intake/Output Summary (Last 24 hours) at 8/22/2024 1131  Last data filed at 8/22/2024 1011  Gross per 24 hour   Intake 503.33 ml   Output 0 ml   Net 503.33 ml       Admission Weight  Weight: 97.5 kg (215 lb) (08/21/24 1259)    Daily Weight  08/21/24 : 97.5 kg (215 lb)    Image Results  CT head wo IV contrast  Narrative: Interpreted By:  Yudy Baca,   STUDY:  CT HEAD WO IV CONTRAST;  8/21/2024 2:54 pm      INDICATION:  Signs/Symptoms:AMS on  eliquis, hx EtOH abuse, unknown if fall.      COMPARISON:  06/04/2024      ACCESSION NUMBER(S):  [id number]      ORDERING CLINICIAN:  KELLY MAHONEY      TECHNIQUE:  Examination was performed in the axial plane using soft tissue and  bone algorithm.      FINDINGS:  INTRACRANIAL:  There is prominence of the ventricular system and cerebral sulci  consistent with cerebral atrophy. There are periventricular  hypodensities consistent with  moderate small vessel disease. No mass  or mass effect is identified. There is no hemorrhage or subdural  fluid collection. There is no acute infarct.  There is a remote deep lacunar infarct involving the white matter of  the right frontal lobe. 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  There is a small remote left cerebellar infarct.  There is no fracture of the calvarium.      EXTRACRANIAL:  Visualized paranasal sinuses and mastoids are clear.      Impression: No acute intracranial pathology.      MACRO:  None      Signed by: Yudy Baca 8/21/2024 3:16 PM  Dictation workstation:   [id number]  ECG 12 lead  Sinus rhythm  Multiple premature complexes, vent & supraven  Borderline prolonged QT interval      Physical Exam  Constitutional:       Appearance: He is ill-appearing.   HENT:      Head: Normocephalic.   Cardiovascular:      Rate and Rhythm: Normal rate and regular rhythm.      Heart sounds: No murmur heard.     No gallop.   Pulmonary:      Effort: Pulmonary effort is normal. No respiratory distress.      Breath sounds: Normal breath sounds. No wheezing.   Abdominal:      General: There is no distension.      Palpations: There is no mass.      Tenderness: There is no abdominal tenderness. There is no right CVA tenderness or left CVA tenderness.   Musculoskeletal:         General: Deformity present.      Cervical back: No rigidity.      Right lower leg: No edema.      Left lower leg: No edema.      Comments: Hx of L great toe OM s/p amputation    Skin:     Findings: Lesion  present.      Comments: Bilateral Knees   Neurological:      Motor: Weakness present.      Gait: Gait abnormal.         Relevant Results  Scheduled medications  apixaban, 5 mg, oral, BID  escitalopram, 20 mg, oral, Daily  folic acid, 1 mg, oral, Daily  magnesium sulfate, 4 g, intravenous, Once  multivitamin with minerals, 1 tablet, oral, Daily  nicotine, 1 patch, transdermal, q24h  OLANZapine, 5 mg, oral, BID  pantoprazole, 40 mg, oral, Daily before breakfast   Or  pantoprazole, 40 mg, intravenous, Daily before breakfast  polyethylene glycol, 17 g, oral, Daily  thiamine, 100 mg, oral, Daily  thiamine, 100 mg, intravenous, Daily  traZODone, 100 mg, oral, Nightly      Continuous medications     PRN medications  PRN medications: acetaminophen, bisacodyl, bisacodyl, guaiFENesin, hydrOXYzine HCL, LORazepam **OR** LORazepam, melatonin, ondansetron **OR** ondansetron  Results for orders placed or performed during the hospital encounter of 08/21/24 (from the past 24 hour(s))   CBC   Result Value Ref Range    WBC 4.4 4.4 - 11.3 x10*3/uL    nRBC 0.0 0.0 - 0.0 /100 WBCs    RBC 4.63 4.50 - 5.90 x10*6/uL    Hemoglobin 14.4 13.5 - 17.5 g/dL    Hematocrit 41.9 41.0 - 52.0 %    MCV 91 80 - 100 fL    MCH 31.1 26.0 - 34.0 pg    MCHC 34.4 32.0 - 36.0 g/dL    RDW 14.5 11.5 - 14.5 %    Platelets 207 150 - 450 x10*3/uL   Comprehensive metabolic panel   Result Value Ref Range    Glucose 118 (H) 74 - 99 mg/dL    Sodium 133 (L) 136 - 145 mmol/L    Potassium 3.3 (L) 3.5 - 5.3 mmol/L    Chloride 101 98 - 107 mmol/L    Bicarbonate 27 21 - 32 mmol/L    Anion Gap 8 (L) 10 - 20 mmol/L    Urea Nitrogen 5 (L) 6 - 23 mg/dL    Creatinine 0.53 0.50 - 1.30 mg/dL    eGFR >90 >60 mL/min/1.73m*2    Calcium 9.1 8.6 - 10.3 mg/dL    Albumin 3.7 3.4 - 5.0 g/dL    Alkaline Phosphatase 68 33 - 136 U/L    Total Protein 8.4 (H) 6.4 - 8.2 g/dL    AST 19 9 - 39 U/L    Bilirubin, Total 0.6 0.0 - 1.2 mg/dL    ALT 11 10 - 52 U/L   Ethanol   Result Value Ref Range     Alcohol 50 (H) <=10 mg/dL   B-type natriuretic peptide   Result Value Ref Range    BNP 15 0 - 99 pg/mL   ECG 12 lead   Result Value Ref Range    Ventricular Rate 94 BPM    Atrial Rate 94 BPM    AK Interval 172 ms    QRS Duration 94 ms    QT Interval 385 ms    QTC Calculation(Bazett) 482 ms    P Axis 54 degrees    R Axis 64 degrees    T Axis 52 degrees    QRS Count 15 beats    Q Onset 254 ms    T Offset 447 ms    QTC Fredericia 447 ms   POCT GLUCOSE   Result Value Ref Range    POCT Glucose 130 (H) 74 - 99 mg/dL   Comprehensive Metabolic Panel   Result Value Ref Range    Glucose 124 (H) 74 - 99 mg/dL    Sodium 135 (L) 136 - 145 mmol/L    Potassium 3.4 (L) 3.5 - 5.3 mmol/L    Chloride 98 98 - 107 mmol/L    Bicarbonate 27 21 - 32 mmol/L    Anion Gap 13 10 - 20 mmol/L    Urea Nitrogen 9 6 - 23 mg/dL    Creatinine 0.49 (L) 0.50 - 1.30 mg/dL    eGFR >90 >60 mL/min/1.73m*2    Calcium 8.9 8.6 - 10.3 mg/dL    Albumin 3.4 3.4 - 5.0 g/dL    Alkaline Phosphatase 64 33 - 136 U/L    Total Protein 7.7 6.4 - 8.2 g/dL    AST 23 9 - 39 U/L    Bilirubin, Total 0.8 0.0 - 1.2 mg/dL    ALT 13 10 - 52 U/L   Magnesium   Result Value Ref Range    Magnesium 1.35 (L) 1.60 - 2.40 mg/dL   CBC and Auto Differential   Result Value Ref Range    WBC 4.9 4.4 - 11.3 x10*3/uL    nRBC 0.0 0.0 - 0.0 /100 WBCs    RBC 4.20 (L) 4.50 - 5.90 x10*6/uL    Hemoglobin 13.0 (L) 13.5 - 17.5 g/dL    Hematocrit 38.0 (L) 41.0 - 52.0 %    MCV 91 80 - 100 fL    MCH 31.0 26.0 - 34.0 pg    MCHC 34.2 32.0 - 36.0 g/dL    RDW 14.7 (H) 11.5 - 14.5 %    Platelets 209 150 - 450 x10*3/uL    Neutrophils % 54.7 40.0 - 80.0 %    Immature Granulocytes %, Automated 0.2 0.0 - 0.9 %    Lymphocytes % 32.5 13.0 - 44.0 %    Monocytes % 9.1 2.0 - 10.0 %    Eosinophils % 2.7 0.0 - 6.0 %    Basophils % 0.8 0.0 - 2.0 %    Neutrophils Absolute 2.66 1.20 - 7.70 x10*3/uL    Immature Granulocytes Absolute, Automated 0.01 0.00 - 0.70 x10*3/uL    Lymphocytes Absolute 1.58 1.20 - 4.80 x10*3/uL     Monocytes Absolute 0.44 0.10 - 1.00 x10*3/uL    Eosinophils Absolute 0.13 0.00 - 0.70 x10*3/uL    Basophils Absolute 0.04 0.00 - 0.10 x10*3/uL     CT head wo IV contrast    Result Date: 8/21/2024  Interpreted By:  Yudy Baca, STUDY: CT HEAD WO IV CONTRAST;  8/21/2024 2:54 pm   INDICATION: Signs/Symptoms:AMS on eliquis, hx EtOH abuse, unknown if fall.   COMPARISON: 06/04/2024   ACCESSION NUMBER(S): [id number]   ORDERING CLINICIAN: KELLY MAHONEY   TECHNIQUE: Examination was performed in the axial plane using soft tissue and bone algorithm.   FINDINGS: INTRACRANIAL: There is prominence of the ventricular system and cerebral sulci consistent with cerebral atrophy. There are periventricular hypodensities consistent with  moderate small vessel disease. No mass or mass effect is identified. There is no hemorrhage or subdural fluid collection. There is no acute infarct. There is a remote deep lacunar infarct involving the white matter of the right frontal lobe. 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 There is a small remote left cerebellar infarct. There is no fracture of the calvarium.   EXTRACRANIAL: Visualized paranasal sinuses and mastoids are clear.       No acute intracranial pathology.   MACRO: None   Signed by: Yudy Baca 8/21/2024 3:16 PM Dictation workstation:   [id number]    ECG 12 lead    Result Date: 8/21/2024  Sinus rhythm Multiple premature complexes, vent & supraven Borderline prolonged QT interval          Assessment/Plan      Assessment & Plan    Alcohol abuse with impending withdrawal  History of withdrawal seizures, DTs  -Alcohol level on 8/21/24 was 50.  -Given IV loading dose of phenobarb in the ED.   -Will continue on prn IV ativan for now.   -CIWA protocol   -Thiamine, MVI, folate   - consultation appreciated   -Patient agreeable to detox program      Hypokalemia  Hyponatremia (chronic)   -K 3.3 > given replacement in the ED > will recheck   -Check mag   -Likely in setting of above    -Na 133, baseline appears to be lower 130s. Continue to follow.   -8/22: K of 3.4, Na of 133, will continue to follow and supplement as needed.     Generalized weakness  Chronic gait instability requiring use of cane as assistive device   -Denies acute worsening of this; no acute focal or lateralizing deficits   -PT/OT  -CTH without acute process; does note prior evidence of strokes. When asked, patient declined wishing for additional evaluation into this at this time.   -8/22: Continue PT/OT, up with assist.     Tobacco Use Disorder   -Smoking cessation education provided during hospitalization   -NRT discussed and offered, does want a patch  -Patient not motivated to quit      History of PE   -Had an acute saddle PE earlier this summer   -He was able to list Eliquis and told me he is taking it as prescribed   -There is no current unilateral edema, CP/pressure, palpitations, SOB. Doing well on RA. No bleeding on CTH.   -Resume Eliquis; fall precautions      GERD  -Continue PPI      Bipolar disorder, anxiety, depression   -Continue home medications               ZEENAT MORGAN    I am the supervising physician in the management of the patient. I have evaluated patient independently,  reviewed and agree with student's documented note. I have edited the above assessment/plan to reflect my final impressions and plans.  Nicko Cook MD  8/22/2024  3:21 PM

## 2024-08-22 NOTE — CARE PLAN
The patient's goals for the shift include      The clinical goals for the shift include  Pt will be free of fall and injuries

## 2024-08-22 NOTE — CARE PLAN
Problem: Mobility  Goal: STG - Patient will ambulate  Description: FWW MIN A X1 100 FT  Outcome: Not Progressing  Goal: Goal 1  Description: 20+ REPS RROM INCREASING STRENGTH TO STABILIZE GAIT  Outcome: Not Progressing     Problem: PT Transfers  Goal: STG - Transfer from bed to chair  Description: FWW MIN A X1  Outcome: Not Progressing  Goal: STG - Patient to transfer to and from sit to supine  Description: HOB FLAT CGA NO RAILS  Outcome: Not Progressing  Goal: STG - Patient will transfer sit to and from stand  Description: FWW USING PROPER TECHNIQUE MIN A X1  Outcome: Not Progressing

## 2024-08-22 NOTE — PROGRESS NOTES
Teddy García is a 63 y.o. male admitted for Alcohol withdrawal syndrome without complication (Multi). Pharmacy reviewed the patient's [id number] medications and allergies for accuracy.    The list below reflects the PTA list prior to pharmacy medication history. A summary a changes to the PTA medication list has been listed below. Please review each medication in order reconciliation for additional clarification and justification.    Source of information: CALLED PHARMACY ([place])     Medications added:  VISTARIL MG 1 BID    Medications modified:    Medications to be removed:  FLEXERIL 5MG  ATARAX 50MG   LIDOCAINE 4% PATCH  NICOTINE 14MG/21MG/7MG PATCH  Medications of concern:      Prior to Admission Medications   Prescriptions Last Dose Informant Patient Reported? Taking?   OLANZapine (ZyPREXA) 5 mg tablet   Yes No   Sig: Take 1 tablet (5 mg) by mouth 2 times a day.   acetaminophen (Tylenol) 325 mg tablet   No No   Sig: Take 2 tablets (650 mg) by mouth every 4 hours if needed for mild pain (1 - 3).   apixaban (Eliquis) 5 mg tablet   No No   Sig: Take 1 tablet (5 mg) by mouth 2 times a day.   cyclobenzaprine (Flexeril) 5 mg tablet   No No   Sig: Take 1 tablet (5 mg) by mouth 3 times a day as needed for muscle spasms.   escitalopram (Lexapro) 20 mg tablet   Yes No   Sig: Take 1 tablet (20 mg) by mouth once daily.   folic acid (Folvite) 1 mg tablet   No No   Sig: Take 1 tablet (1 mg) by mouth once daily.   hydrOXYzine HCL (Atarax) 50 mg tablet   Yes No   Sig: Take 1 tablet (50 mg) by mouth 2 times a day as needed for itching or anxiety.   lidocaine 4 % patch   No No   Sig: Place 1 patch over 12 hours on the skin once every 24 hours. Remove & discard patch within 12 hours or as directed by MD.   metFORMIN (Glucophage) 500 mg tablet   Yes Yes   Sig: Take 1 tablet (500 mg) by mouth once daily with breakfast.   multivitamin with minerals tablet   No No   Sig: Take 1 tablet by mouth once daily.   nicotine  (Nicoderm CQ) 14 mg/24 hr patch   No No   Sig: Place 1 patch over 24 hours on the skin once every 24 hours for 14 doses.   nicotine (Nicoderm CQ) 21 mg/24 hr patch   No No   Sig: Place 1 patch over 24 hours on the skin once every 24 hours for 36 doses.   nicotine (Nicoderm CQ) 7 mg/24 hr patch   No No   Sig: Place 1 patch over 24 hours on the skin once every 24 hours for 14 doses.   thiamine (Vitamin B-1) 100 mg tablet   No No   Sig: Take 1 tablet (100 mg) by mouth once daily.   traZODone (Desyrel) 100 mg tablet   No No   Sig: Take 1 tablet (100 mg) by mouth once daily at bedtime.      Facility-Administered Medications: None       MITCHELL HASTINGS

## 2024-08-22 NOTE — PROGRESS NOTES
08/22/24 1423   Discharge Planning   Living Arrangements Alone   Support Systems /   Assistance Needed ADL's   Type of Residence Private residence   RoundTrip coordination needed? Yes     Discharge assessment complete. Patient was sleepy during questioning but was oriented. Patient states that he lives at home alone. Patient states that he utilizes walker at home for ambulation. Patient states that he has a  from [place].  provides patient with transportation to and from appointments as needed. SW is consulted for ETOH. [facility] facility unwilling to take back due to ambulation needs being more than they can accommodate at this time. PT/OT pending. TCC following.

## 2024-08-22 NOTE — H&P
[facility] - GENERAL MEDICINE HISTORY AND PHYSICAL    History Obtained From: Patient  Collateral History: Chart review, discussion with ED physician    History Of Present Illness:  Teddy García is a 63 y.o. male with PMHx s/f HTN (no longer on meds), prediabetes (A1c 5.8%), prior PE on Eliquis, prior OM of the L great toe s/p amputation, GERD, bipolar disorder, anxiety, depression, alcohol abuse abuse with h/o withdrawal seizures / DTs, tobacco use disorder, chronic gait instability (requires a cane at baseline), presenting with wishes to undergo treatment for alcohol abuse. Pt is very flat with his responses and a difficult historian. He reports he presented to the emergency department today with wishes to undergo treatment of his alcohol use disorder, not wanting to provide much additional insight into this. Reports he is still drinking about 1 pint of vodka daily and a six pack of beer, with his last drinking being in the morning today (08/21/2024). He was initially evaluated in the ED and discharged up to [place]; however, they brought him back down to the ED (his belongings were returned in bags during my exam) after they informed the emergency department that he was not able to participate in their services because he would need to be able to be ambulatory without assistive devices as they do not have the capabilities to assist with ambulation there. On my evaluation, the patient reports generalized weakness and aches / pains where are at baseline. He admits to falling on the way to his couch (got tripped up) a few days ago and that he scraped his knees in the process. He reports compliance with his Eliquis and the only missed dose was this morning d/t being in the ED. He denies CP/pressure, palpitations, SOB, HWANG, HA, vision changes, f/c/n/v/d/abd pain, focal and/or lateralizing sensory or motor deficits. He was resting comfortably watching TV in the ED.     ED Course (Summary):   Vitals on  presentation: T 98.1, /86, HR 96 > 74, R 18, SpO2 96% RA  Labs: CBC without abnormalities. CMP with glucose 118, Na 133, K 3.3, BUN 5, sCr 0.53, LFTs without notable abnormalities. Alcohol 50 (from 1349 this afternoon).   Imaging: CTH - no acute process, evidence of prior infarcts (remote deep lacunar infarct involving the white matter of the right frontal lobe. 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 There is a small remote left cerebellar infarct.)   Interventions: 20mEq Kcl replacement and initial discharge to [facility]; followed by repeat evaluation + phenobarb 130IV x1, ativan IV per protocol, thiamine/mvi/folate, admitted to medicine     PMHx: As above   Surgical Hx: Tonsillectomy, ORIF left arm fracture, amputation of L great toe  Social Hx: Smoking 1ppd x decades; not motivated to quit. Drinking ~1 pint of vodka and 1 six pack of beer daily. Single, lives alone.   Family Hx: Reviewed and found to be noncontributory to presenting complaint    ED Course (From ED Provider):  ED Course as of 08/21/24 2212   Wed Aug 21, 2024   1420 Resting comfortably in bed, awake, alert. Seizure precautions in place. Will continue to monitor. [JG]   1901 Information faxed to [facility] per their request. Discussed case with [facility] as patient will need a wheelchair on discharge, he usually uses a rollator but EMS left rollator behind in his apartment. His apartment was left unlocked and his neighbor has an extra key for him if he needs it. [facility] working on obtaining wheelchair for patient at this time. [JG]   1930 Patient accepted by [facility], pending transport. [JG]      ED Course User Index  [JG] Pattie Mcpherson MD         Diagnoses as of 08/21/24 2212   Alcohol withdrawal syndrome without complication (Multi)   Impaired mobility and ADLs     Relevant Results  Results for orders placed or performed during the hospital encounter of 08/21/24 (from the past 24 hour(s))   CBC   Result Value Ref Range    WBC  4.4 4.4 - 11.3 x10*3/uL    nRBC 0.0 0.0 - 0.0 /100 WBCs    RBC 4.63 4.50 - 5.90 x10*6/uL    Hemoglobin 14.4 13.5 - 17.5 g/dL    Hematocrit 41.9 41.0 - 52.0 %    MCV 91 80 - 100 fL    MCH 31.1 26.0 - 34.0 pg    MCHC 34.4 32.0 - 36.0 g/dL    RDW 14.5 11.5 - 14.5 %    Platelets 207 150 - 450 x10*3/uL   Comprehensive metabolic panel   Result Value Ref Range    Glucose 118 (H) 74 - 99 mg/dL    Sodium 133 (L) 136 - 145 mmol/L    Potassium 3.3 (L) 3.5 - 5.3 mmol/L    Chloride 101 98 - 107 mmol/L    Bicarbonate 27 21 - 32 mmol/L    Anion Gap 8 (L) 10 - 20 mmol/L    Urea Nitrogen 5 (L) 6 - 23 mg/dL    Creatinine 0.53 0.50 - 1.30 mg/dL    eGFR >90 >60 mL/min/1.73m*2    Calcium 9.1 8.6 - 10.3 mg/dL    Albumin 3.7 3.4 - 5.0 g/dL    Alkaline Phosphatase 68 33 - 136 U/L    Total Protein 8.4 (H) 6.4 - 8.2 g/dL    AST 19 9 - 39 U/L    Bilirubin, Total 0.6 0.0 - 1.2 mg/dL    ALT 11 10 - 52 U/L   Ethanol   Result Value Ref Range    Alcohol 50 (H) <=10 mg/dL   B-type natriuretic peptide   Result Value Ref Range    BNP 15 0 - 99 pg/mL   ECG 12 lead   Result Value Ref Range    Ventricular Rate 94 BPM    Atrial Rate 94 BPM    IL Interval 172 ms    QRS Duration 94 ms    QT Interval 385 ms    QTC Calculation(Bazett) 482 ms    P Axis 54 degrees    R Axis 64 degrees    T Axis 52 degrees    QRS Count 15 beats    Q Onset 254 ms    T Offset 447 ms    QTC Fredericia 447 ms   POCT GLUCOSE   Result Value Ref Range    POCT Glucose 130 (H) 74 - 99 mg/dL      CT head wo IV contrast    Result Date: 8/21/2024  Interpreted By:  Yudy Baca, STUDY: CT HEAD WO IV CONTRAST;  8/21/2024 2:54 pm   INDICATION: Signs/Symptoms:AMS on eliquis, hx EtOH abuse, unknown if fall.   COMPARISON: 06/04/2024   ACCESSION NUMBER(S): [id number]   ORDERING CLINICIAN: KELLY MAHONEY   TECHNIQUE: Examination was performed in the axial plane using soft tissue and bone algorithm.   FINDINGS: INTRACRANIAL: There is prominence of the ventricular system and cerebral sulci  consistent with cerebral atrophy. There are periventricular hypodensities consistent with  moderate small vessel disease. No mass or mass effect is identified. There is no hemorrhage or subdural fluid collection. There is no acute infarct. There is a remote deep lacunar infarct involving the white matter of the right frontal lobe. 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 There is a small remote left cerebellar infarct. There is no fracture of the calvarium.   EXTRACRANIAL: Visualized paranasal sinuses and mastoids are clear.       No acute intracranial pathology.   MACRO: None   Signed by: Yudy Baca 8/21/2024 3:16 PM Dictation workstation:   [id number]    ECG 12 lead    Result Date: 8/21/2024  Sinus rhythm Multiple premature complexes, vent & supraven Borderline prolonged QT interval    Scheduled medications:  apixaban, 5 mg, oral, BID  [START ON 8/22/2024] escitalopram, 20 mg, oral, Daily  [START ON 8/22/2024] folic acid, 1 mg, oral, Daily  [START ON 8/22/2024] multivitamin with minerals, 1 tablet, oral, Daily  OLANZapine, 5 mg, oral, BID  [START ON 8/22/2024] pantoprazole, 40 mg, oral, Daily before breakfast   Or  [START ON 8/22/2024] pantoprazole, 40 mg, intravenous, Daily before breakfast  [START ON 8/22/2024] polyethylene glycol, 17 g, oral, Daily  [START ON 8/22/2024] thiamine, 100 mg, oral, Daily  [START ON 8/22/2024] thiamine, 100 mg, intravenous, Daily  traZODone, 100 mg, oral, Nightly      Continuous medications:     PRN medications:  PRN medications: acetaminophen, bisacodyl, bisacodyl, guaiFENesin, LORazepam **OR** LORazepam, melatonin, ondansetron **OR** ondansetron, PHENobarbital     Past Medical History  He has a past medical history of Alcohol abuse, in remission, Cancer (Multi), and CHF (congestive heart failure) (Multi).    Surgical History  He has a past surgical history that includes Other surgical history (08/26/2019).     Social History  He reports that he has been smoking cigarettes. He  has never used smokeless tobacco. He reports current alcohol use of about 6.0 standard drinks of alcohol per week. He reports that he does not currently use drugs after having used the following drugs: Marijuana.    Family History  No family history on file.    Allergies  Hydralazine    Code Status  Full Code     Review of Systems   Constitutional:  Positive for appetite change and fatigue. Negative for chills and fever.   Eyes:  Negative for photophobia and visual disturbance.   Respiratory:  Positive for cough (chronic, dry, denies hemoptysis). Negative for shortness of breath.    Cardiovascular:  Negative for chest pain, palpitations and leg swelling.   Gastrointestinal:  Negative for abdominal distention, abdominal pain, anal bleeding, blood in stool, constipation, diarrhea, nausea, rectal pain and vomiting.   Endocrine: Negative for polydipsia and polyuria.   Genitourinary:  Negative for decreased urine volume, dysuria, hematuria and urgency.   Musculoskeletal:  Positive for back pain, gait problem and myalgias. Negative for neck pain and neck stiffness.   Skin:  Positive for color change (scrapes to both knees). Negative for rash.   Neurological:  Positive for tremors and weakness (generalized). Negative for dizziness, seizures, syncope, facial asymmetry, speech difficulty, light-headedness, numbness and headaches.   Psychiatric/Behavioral:  Positive for dysphoric mood. Negative for confusion and sleep disturbance.    All other systems reviewed and are negative.    Last Recorded Vitals  /78   Pulse 74   Temp 36.7 °C (98.1 °F) (Temporal)   Resp 18   Wt 97.5 kg (215 lb)   SpO2 94%      Physical Exam:  Vital signs and nursing notes reviewed.   Constitutional: Chronically ill-appearing adult male. Laying in bed in no acute distress.   Skin: Warm and dry; scattered mild scrapes to the knees and extremities in various stages of healing    Eyes: EOMI. Anicteric sclera.   ENT: Mucous membranes dry; poor  dentition; no obvious injury or deformity appreciated.   Head and Neck: Normocephalic, atraumatic. ROM preserved. Trachea midline. No appreciable JVD.   Respiratory: Nonlabored on RA. Lungs clear to auscultation bilaterally without obvious adventitious sounds. Chest rise is equal.  Cardiovascular: RRR. No gross murmur, gallop, or rub. Extremities are warm and well-perfused with good capillary refill (< 3 seconds). No chest wall tenderness.   GI: Abdomen soft, nontender, nondistended. No obvious organomegaly appreciated. Bowel sounds are present and normoactive.  : No CVA tenderness.   MSK: S/p L great toe amputation. No gross abnormalities appreciated. Global weakness (nonfocal).    Extremities: No cyanosis, edema, or clubbing evident.   Neuro: A&Ox3. CN 2-12 grossly intact. Able to respond to questions appropriately and clearly. No acute focal neurologic deficits appreciated. Slightly tremulous when reaching for TV remote.   Psych: Flat affect    Assessment/Plan     63 y.o. male with PMHx s/f HTN (no longer on meds), prediabetes (A1c 5.8%), prior PE on Eliquis, prior OM of the L great toe s/p amputation, GERD, bipolar disorder, anxiety, depression, alcohol abuse abuse with h/o withdrawal seizures / DTs, tobacco use disorder, chronic gait instability (requires a cane at baseline), presenting with wishes to undergo treatment for alcohol abuse.     Alcohol abuse with impending withdrawal  History of withdrawal seizures, DTs  -Alcohol level ~9 hours ago was 50. Alcohol intake as noted above.   -Given IV loading dose of phenobarb in the ED. Will continue on prn IV ativan for now.   -[facility] protocol   -Thiamine, MVI, folate   - consultation appreciated     Hypokalemia  Hyponatremia (chronic)   -K 3.3 > given replacement in the ED > will recheck   -Check mag   -Likely in setting of above   -Na 133, baseline appears to be lower 130s. Continue to follow.     Generalized weakness  Chronic gait instability requiring use of  cane as assistive device   -Denies acute worsening of this; no acute focal or lateralizing deficits   -PT/OT  -CTH without acute process; does note prior evidence of strokes. When asked, patient declined wishing for additional evaluation into this at this time.     Tobacco Use Disorder   -Smoking cessation education provided during hospitalization   -NRT discussed and offered, does want a patch  -Patient not motivated to quit     History of PE   -Had an acute saddle PE earlier this summer   -He was able to list Eliquis and told me he is taking it as prescribed   -There is no current unilateral edema, CP/pressure, palpitations, SOB. Doing well on RA. No bleeding on CTH.   -Resume Eliquis; fall precautions     GERD  -Continue PPI     Bipolar disorder, anxiety, depression   -Continue home medications     Diet: Regular  DVT Prophylaxis: SCDs, Eliquis   Code Status: Full Code      Radha Cisneros PA-C    Dragon dictation software was used to dictate this note and thus there may be minor errors in translation/transcription including garbled speech or misspellings. Please contact for clarification if needed.

## 2024-08-22 NOTE — CARE PLAN
The patient's goals for the shift include      The clinical goals for the shift include Pt will be free of a fall or injury

## 2024-08-22 NOTE — PROGRESS NOTES
Social work consult placed for discharge planning/etoh substance abuse. SW reviewed pt's chart and communicated with TCC. Per chart review, pt admitted for alcohol withdrawal. [facility] unable to accept pt due to pt needing assistive devices to aide in ambulation and behaviors he presented there yesterday according to admission department at [facility]. SW met with pt to assess needs and provide support, introduced self and my role as  with care transition team. Pt seemed very delayed in responses and as result was poor historian at this time. SW not able to gather much information from pt at this time. PT/OT to work with pt. SW to follow and assist with discharge planning needs.    MARYA Garsia ([id number])   Care Transitions

## 2024-08-22 NOTE — PROGRESS NOTES
Occupational Therapy    Evaluation    Patient Name: Teddy García  MRN: [id number]  Today's Date: 8/22/2024  Time Calculation  Start Time: 1050  Stop Time: 1120  Time Calculation (min): 30 min  3302/3302-A    Assessment  IP OT Assessment  OT Assessment: pt. high fall risk, very limited stand geovanny. with assist of 2 and FWW, dep. with most ADLs  Prognosis: Good  Barriers to Discharge: Decreased caregiver support  Medical Staff Made Aware: Yes  End of Session Communication: Bedside nurse  End of Session Patient Position: Bed, 3 rail up, Alarm on       08/22/24 1050   Inpatient Plan   Treatment Interventions ADL retraining;Functional transfer training;Endurance training;UE strengthening/ROM   OT Frequency 4 times per week   OT - OK to Discharge Yes  ((when medically approp.))   OT Discharge Recommendations Moderate intensity level of continued care   OT Recommended Transfer Status Assist of 2   Subjective     Current Problem:  1. Alcohol withdrawal syndrome without complication (Multi)        2. Impaired mobility and ADLs            General:  General  Reason for Referral: ETOH w/d  Referred By: Blayne  Past Medical History Relevant to Rehab: Hx. of PE, L great toe amp., anx./dep., L arm fx., w/d seizures  Co-Treatment: PT  Co-Treatment Reason: to maximize safe mobility  Patient Position Received: Bed, 3 rail up, Alarm on  General Comment: pt. with flat affect, delayed with all responses, but agreeaable    Precautions:  Medical Precautions: Fall precautions, Seizure precautions    Vital Signs:see nurses notes        Pain:  Pain Assessment  0-10 (Numeric) Pain Score:  (c/o L knee pain)    Objective     Cognition:  Overall Cognitive Status: Impaired  Orientation Level: Oriented X4  Processing Speed: Delayed         Home Living:  Type of Home: Apartment  Lives With: Alone  Home Adaptive Equipment: Walker rolling or standard  Home Layout: One level  Home Living Comments: doesn't drive, takes PARTA     Prior  Function:  Level of [place]: Independent with ADLs and functional transfers, Independent with homemaking with ambulation  ADL Assistance: Independent  Homemaking Assistance: Independent  Ambulatory Assistance: Independent (states has used FWW for months.)    IADL History:  Homemaking Responsibilities: Yes  Meal Prep Responsibility: Primary  Laundry Responsibility: Primary  Cleaning Responsibility: Primary  Mode of Transportation:  (PARTA)    ADL:  Eating Assistance: Minimal (spilled a lot on his gown , eating in bed , given fork to use rather than spoon)  Bathing Assistance:  (dep.)  LE Dressing Assistance:  (dep.)  Toileting Assistance with Device:  (dep. with purewick, bedpan)    Activity Tolerance:  Endurance: Decreased tolerance for upright activites    Bed Mobility/Transfers:   Bed Mobility  Bed Mobility: Yes (Max.A supine-to-sit, Max.A X 2 sit-to-supine , unable to scoot along bedside while seated)  Transfers  Transfer: Yes (Mod.A X 2 sit-to-stand with FWW)    Ambulation/Gait Training:NT, unable        Sitting Balance:  Static Sitting Balance  Static Sitting-Balance Support:  (SBA EOB, dizziness while seated initially)    Standing Balance:  Static Standing Balance  Static Standing-Balance Support:  (assist X 2 with FWW for only few secs. of static stand)    Vision: Vision - Basic Assessment  Current Vision: No visual deficits     Sensation:  Light Touch:  (states tingling feet)    Strength:  Strength Comments: UEs fair-       Coordination:  Movements are Fluid and Coordinated: No  Finger to Nose: Impaired  Finger to Target:  (very slow with all movements)     Hand Function:  Hand Function  Gross Grasp: Functional      Outcome Measures: [facility] Daily Activity  Putting on and taking off regular lower body clothing: Total  Bathing (including washing, rinsing, drying): Total  Putting on and taking off regular upper body clothing: A lot  Toileting, which includes using toilet, bedpan or urinal: Total  Taking  care of personal grooming such as brushing teeth: Total  Eating Meals: A little  Daily Activity - Total Score: 9                       EDUCATION:     Education Documentation  Home Exercise Program, taught by Meena Mckeon OT at 8/22/2024 11:54 AM.  Learner: Patient  Readiness: Acceptance  Method: Demonstration  Response: Needs Reinforcement  Comment: UE AROM ex. while seated EOB or in bed    Goals:   Encounter Problems       Encounter Problems (Active)       ADLs       Demo. UB bathing, grooming with Min.A while seated EOB.    (Progressing)       Start:  08/22/24    Expected End:  08/29/24               BALANCE       Leela. at least 1 min. X 2 CGA static stand with FWW.  (Progressing)       Start:  08/22/24    Expected End:  08/29/24               EXERCISE/STRENGTHENING       Leela. bilat. UE ex., all planes, 15 reps., while seated EOB.  (Progressing)       Start:  08/22/24    Expected End:  08/29/24

## 2024-08-22 NOTE — PROGRESS NOTES
Emergency Medicine Transition of Care Note.    I received Teddy García in signout from Dr. Mcpherson.  Please see the previous ED provider note for all HPI, PE and MDM up to the time of signout at 20:30. This is in addition to the primary record.    In brief Teddy García is an 63 y.o. male presenting for   Chief Complaint   Patient presents with    Delirium Tremens (DTS)     Last drink was this morning 0800. Pt had a pint of whiskey throughout the night     At the time of signout we were awaiting: discharged    ED Course as of 08/21/24 2208   Wed Aug 21, 2024   1420 Resting comfortably in bed, awake, alert. Seizure precautions in place. Will continue to monitor. [JG]   1901 Information faxed to [facility] per their request. Discussed case with [facility] as patient will need a wheelchair on discharge, he usually uses a rollator but EMS left rollator behind in his apartment. His apartment was left unlocked and his neighbor has an extra key for him if he needs it. [facility] working on obtaining wheelchair for patient at this time. [JG]   1930 Patient accepted by [facility], pending transport. [JG]      ED Course User Index  [JG] Pattie Mcpherson MD         Diagnoses as of 08/21/24 2208   Alcohol withdrawal syndrome without complication (Multi)   Impaired mobility and ADLs       Medical Decision Making  Patient was signed out to me heading to [facility] for alcohol rehab.  I received a call from the nursing supervisor from upstairs.  Unfortunately the patient has too many needs and dysfunction of his activities of daily living to go up to [facility] as in [facility] they need to be very independent.  After discussion with the nursing supervisor and the internal medicine team we will admit the patient to medicine for management of his alcohol withdrawal and DTs and social work consultation.  He was given a dose of IV phenobarbital placed on CIWA protocol here in the emergency department for a CIWA of 7 and a RASS  score of +1.  Patient admitted in otherwise stable condition.    Final diagnoses:   [F10.930] Alcohol withdrawal syndrome without complication (Multi)   [Z74.09, Z78.9] Impaired mobility and ADLs           Procedure  Procedures    Tarik Pastor MD

## 2024-08-23 PROBLEM — E87.6 HYPOKALEMIA: Status: RESOLVED | Noted: 2024-08-21 | Resolved: 2024-08-23

## 2024-08-23 LAB
ALBUMIN SERPL BCP-MCNC: 3.1 G/DL (ref 3.4–5)
ALP SERPL-CCNC: 61 U/L (ref 33–136)
ALT SERPL W P-5'-P-CCNC: 12 U/L (ref 10–52)
ANION GAP SERPL CALC-SCNC: 9 MMOL/L (ref 10–20)
AST SERPL W P-5'-P-CCNC: 21 U/L (ref 9–39)
ATRIAL RATE: 94 BPM
BILIRUB SERPL-MCNC: 0.5 MG/DL (ref 0–1.2)
BUN SERPL-MCNC: 13 MG/DL (ref 6–23)
CALCIUM SERPL-MCNC: 8.4 MG/DL (ref 8.6–10.3)
CHLORIDE SERPL-SCNC: 102 MMOL/L (ref 98–107)
CO2 SERPL-SCNC: 27 MMOL/L (ref 21–32)
CREAT SERPL-MCNC: 0.58 MG/DL (ref 0.5–1.3)
EGFRCR SERPLBLD CKD-EPI 2021: >90 ML/MIN/1.73M*2
ERYTHROCYTE [DISTWIDTH] IN BLOOD BY AUTOMATED COUNT: 14.6 % (ref 11.5–14.5)
GLUCOSE SERPL-MCNC: 164 MG/DL (ref 74–99)
HCT VFR BLD AUTO: 36.3 % (ref 41–52)
HGB BLD-MCNC: 12.3 G/DL (ref 13.5–17.5)
MAGNESIUM SERPL-MCNC: 1.62 MG/DL (ref 1.6–2.4)
MCH RBC QN AUTO: 31.2 PG (ref 26–34)
MCHC RBC AUTO-ENTMCNC: 33.9 G/DL (ref 32–36)
MCV RBC AUTO: 92 FL (ref 80–100)
NRBC BLD-RTO: 0 /100 WBCS (ref 0–0)
P AXIS: 54 DEGREES
PLATELET # BLD AUTO: 189 X10*3/UL (ref 150–450)
POTASSIUM SERPL-SCNC: 4.1 MMOL/L (ref 3.5–5.3)
PR INTERVAL: 172 MS
PROT SERPL-MCNC: 7.1 G/DL (ref 6.4–8.2)
Q ONSET: 254 MS
QRS COUNT: 15 BEATS
QRS DURATION: 94 MS
QT INTERVAL: 385 MS
QTC CALCULATION(BAZETT): 482 MS
QTC FREDERICIA: 447 MS
R AXIS: 64 DEGREES
RBC # BLD AUTO: 3.94 X10*6/UL (ref 4.5–5.9)
SODIUM SERPL-SCNC: 134 MMOL/L (ref 136–145)
T AXIS: 52 DEGREES
T OFFSET: 447 MS
VENTRICULAR RATE: 94 BPM
WBC # BLD AUTO: 4.7 X10*3/UL (ref 4.4–11.3)

## 2024-08-23 PROCEDURE — 2500000002 HC RX 250 W HCPCS SELF ADMINISTERED DRUGS (ALT 637 FOR MEDICARE OP, ALT 636 FOR OP/ED): Performed by: STUDENT IN AN ORGANIZED HEALTH CARE EDUCATION/TRAINING PROGRAM

## 2024-08-23 PROCEDURE — 97116 GAIT TRAINING THERAPY: CPT | Mod: GP,CQ

## 2024-08-23 PROCEDURE — 97530 THERAPEUTIC ACTIVITIES: CPT | Mod: GP,CQ

## 2024-08-23 PROCEDURE — 97110 THERAPEUTIC EXERCISES: CPT | Mod: GP,CQ

## 2024-08-23 PROCEDURE — S4991 NICOTINE PATCH NONLEGEND: HCPCS | Performed by: STUDENT IN AN ORGANIZED HEALTH CARE EDUCATION/TRAINING PROGRAM

## 2024-08-23 PROCEDURE — 85027 COMPLETE CBC AUTOMATED: CPT | Performed by: INTERNAL MEDICINE

## 2024-08-23 PROCEDURE — 97535 SELF CARE MNGMENT TRAINING: CPT | Mod: GO,CO

## 2024-08-23 PROCEDURE — 2500000001 HC RX 250 WO HCPCS SELF ADMINISTERED DRUGS (ALT 637 FOR MEDICARE OP): Performed by: STUDENT IN AN ORGANIZED HEALTH CARE EDUCATION/TRAINING PROGRAM

## 2024-08-23 PROCEDURE — 80053 COMPREHEN METABOLIC PANEL: CPT | Performed by: INTERNAL MEDICINE

## 2024-08-23 PROCEDURE — 2500000002 HC RX 250 W HCPCS SELF ADMINISTERED DRUGS (ALT 637 FOR MEDICARE OP, ALT 636 FOR OP/ED): Performed by: INTERNAL MEDICINE

## 2024-08-23 PROCEDURE — 2500000004 HC RX 250 GENERAL PHARMACY W/ HCPCS (ALT 636 FOR OP/ED): Performed by: STUDENT IN AN ORGANIZED HEALTH CARE EDUCATION/TRAINING PROGRAM

## 2024-08-23 PROCEDURE — 36415 COLL VENOUS BLD VENIPUNCTURE: CPT | Performed by: INTERNAL MEDICINE

## 2024-08-23 PROCEDURE — 99233 SBSQ HOSP IP/OBS HIGH 50: CPT | Performed by: INTERNAL MEDICINE

## 2024-08-23 PROCEDURE — 1200000002 HC GENERAL ROOM WITH TELEMETRY DAILY

## 2024-08-23 PROCEDURE — 83735 ASSAY OF MAGNESIUM: CPT | Performed by: INTERNAL MEDICINE

## 2024-08-23 RX ORDER — METFORMIN HYDROCHLORIDE 500 MG/1
500 TABLET ORAL
Status: DISCONTINUED | OUTPATIENT
Start: 2024-08-23 | End: 2024-08-26 | Stop reason: HOSPADM

## 2024-08-23 ASSESSMENT — LIFESTYLE VARIABLES
NAUSEA AND VOMITING: NO NAUSEA AND NO VOMITING
PAROXYSMAL SWEATS: NO SWEAT VISIBLE
TREMOR: NO TREMOR
TREMOR: NO TREMOR
ANXIETY: NO ANXIETY, AT EASE
AUDITORY DISTURBANCES: NOT PRESENT
TREMOR: NO TREMOR
AGITATION: SOMEWHAT MORE THAN NORMAL ACTIVITY
TOTAL SCORE: 2
ANXIETY: MILDLY ANXIOUS
ANXIETY: NO ANXIETY, AT EASE
VISUAL DISTURBANCES: NOT PRESENT
HEADACHE, FULLNESS IN HEAD: VERY MILD
HEADACHE, FULLNESS IN HEAD: VERY MILD
TOTAL SCORE: 3
VISUAL DISTURBANCES: NOT PRESENT
AGITATION: NORMAL ACTIVITY
HEADACHE, FULLNESS IN HEAD: VERY MILD
TOTAL SCORE: 1
ORIENTATION AND CLOUDING OF SENSORIUM: CANNOT DO SERIAL ADDITIONS OR IS UNCERTAIN ABOUT DATE
HEADACHE, FULLNESS IN HEAD: VERY MILD
ANXIETY: NO ANXIETY, AT EASE
AUDITORY DISTURBANCES: NOT PRESENT
PAROXYSMAL SWEATS: NO SWEAT VISIBLE
AUDITORY DISTURBANCES: NOT PRESENT
VISUAL DISTURBANCES: NOT PRESENT
HEADACHE, FULLNESS IN HEAD: VERY MILD
TOTAL SCORE: 3
TREMOR: NO TREMOR
NAUSEA AND VOMITING: NO NAUSEA AND NO VOMITING
TOTAL SCORE: 2
AUDITORY DISTURBANCES: NOT PRESENT
VISUAL DISTURBANCES: NOT PRESENT
ORIENTATION AND CLOUDING OF SENSORIUM: CANNOT DO SERIAL ADDITIONS OR IS UNCERTAIN ABOUT DATE
AGITATION: NORMAL ACTIVITY
TOTAL SCORE: 1
PAROXYSMAL SWEATS: NO SWEAT VISIBLE
TREMOR: NO TREMOR
PAROXYSMAL SWEATS: NO SWEAT VISIBLE
ORIENTATION AND CLOUDING OF SENSORIUM: ORIENTED AND CAN DO SERIAL ADDITIONS
NAUSEA AND VOMITING: NO NAUSEA AND NO VOMITING
NAUSEA AND VOMITING: NO NAUSEA AND NO VOMITING
PAROXYSMAL SWEATS: NO SWEAT VISIBLE
HEADACHE, FULLNESS IN HEAD: VERY MILD
ANXIETY: NO ANXIETY, AT EASE
AGITATION: NORMAL ACTIVITY
TREMOR: NO TREMOR
VISUAL DISTURBANCES: NOT PRESENT
PAROXYSMAL SWEATS: NO SWEAT VISIBLE
AUDITORY DISTURBANCES: NOT PRESENT
NAUSEA AND VOMITING: NO NAUSEA AND NO VOMITING
ORIENTATION AND CLOUDING OF SENSORIUM: ORIENTED AND CAN DO SERIAL ADDITIONS
VISUAL DISTURBANCES: NOT PRESENT
HEADACHE, FULLNESS IN HEAD: VERY MILD
AUDITORY DISTURBANCES: NOT PRESENT
NAUSEA AND VOMITING: NO NAUSEA AND NO VOMITING
PAROXYSMAL SWEATS: NO SWEAT VISIBLE
AUDITORY DISTURBANCES: NOT PRESENT
ORIENTATION AND CLOUDING OF SENSORIUM: CANNOT DO SERIAL ADDITIONS OR IS UNCERTAIN ABOUT DATE
VISUAL DISTURBANCES: NOT PRESENT
TOTAL SCORE: 2
NAUSEA AND VOMITING: NO NAUSEA AND NO VOMITING
AGITATION: NORMAL ACTIVITY
AGITATION: NORMAL ACTIVITY
ORIENTATION AND CLOUDING OF SENSORIUM: CANNOT DO SERIAL ADDITIONS OR IS UNCERTAIN ABOUT DATE
TREMOR: NOT VISIBLE, BUT CAN BE FELT FINGERTIP TO FINGERTIP
ORIENTATION AND CLOUDING OF SENSORIUM: ORIENTED AND CAN DO SERIAL ADDITIONS
AGITATION: NORMAL ACTIVITY

## 2024-08-23 ASSESSMENT — COGNITIVE AND FUNCTIONAL STATUS - GENERAL
EATING MEALS: A LITTLE
DAILY ACTIVITIY SCORE: 9
MOVING FROM LYING ON BACK TO SITTING ON SIDE OF FLAT BED WITH BEDRAILS: A LOT
MOBILITY SCORE: 10
MOBILITY SCORE: 10
HELP NEEDED FOR BATHING: A LOT
CLIMB 3 TO 5 STEPS WITH RAILING: TOTAL
PERSONAL GROOMING: A LITTLE
STANDING UP FROM CHAIR USING ARMS: A LOT
CLIMB 3 TO 5 STEPS WITH RAILING: TOTAL
TURNING FROM BACK TO SIDE WHILE IN FLAT BAD: A LOT
TURNING FROM BACK TO SIDE WHILE IN FLAT BAD: A LOT
MOVING TO AND FROM BED TO CHAIR: TOTAL
MOVING FROM LYING ON BACK TO SITTING ON SIDE OF FLAT BED WITH BEDRAILS: A LOT
HELP NEEDED FOR BATHING: A LOT
DAILY ACTIVITIY SCORE: 14
HELP NEEDED FOR BATHING: TOTAL
PERSONAL GROOMING: TOTAL
DRESSING REGULAR LOWER BODY CLOTHING: A LOT
WALKING IN HOSPITAL ROOM: TOTAL
PERSONAL GROOMING: A LITTLE
DRESSING REGULAR LOWER BODY CLOTHING: TOTAL
TOILETING: A LOT
WALKING IN HOSPITAL ROOM: TOTAL
CLIMB 3 TO 5 STEPS WITH RAILING: TOTAL
TURNING FROM BACK TO SIDE WHILE IN FLAT BAD: TOTAL
MOVING TO AND FROM BED TO CHAIR: A LOT
DRESSING REGULAR UPPER BODY CLOTHING: A LOT
STANDING UP FROM CHAIR USING ARMS: TOTAL
WALKING IN HOSPITAL ROOM: TOTAL
TOILETING: A LOT
DAILY ACTIVITIY SCORE: 14
MOVING TO AND FROM BED TO CHAIR: A LOT
STANDING UP FROM CHAIR USING ARMS: A LOT
TOILETING: TOTAL
DRESSING REGULAR UPPER BODY CLOTHING: A LOT
DRESSING REGULAR UPPER BODY CLOTHING: A LOT
DRESSING REGULAR LOWER BODY CLOTHING: A LOT
EATING MEALS: A LITTLE
MOVING FROM LYING ON BACK TO SITTING ON SIDE OF FLAT BED WITH BEDRAILS: A LOT
EATING MEALS: A LITTLE
MOBILITY SCORE: 7

## 2024-08-23 ASSESSMENT — PAIN SCALES - GENERAL
PAINLEVEL_OUTOF10: 7
PAINLEVEL_OUTOF10: 5 - MODERATE PAIN
PAINLEVEL_OUTOF10: 7
PAINLEVEL_OUTOF10: 8

## 2024-08-23 ASSESSMENT — PAIN - FUNCTIONAL ASSESSMENT
PAIN_FUNCTIONAL_ASSESSMENT: 0-10

## 2024-08-23 ASSESSMENT — ACTIVITIES OF DAILY LIVING (ADL)
BATHING_WHERE_ASSESSED: EDGE OF BED
BATHING_LEVEL_OF_ASSISTANCE: MODERATE ASSISTANCE
HOME_MANAGEMENT_TIME_ENTRY: 38

## 2024-08-23 ASSESSMENT — PAIN DESCRIPTION - LOCATION: LOCATION: GENERALIZED

## 2024-08-23 NOTE — PROGRESS NOTES
Occupational Therapy    OT Treatment    Patient Name: Teddy García  MRN: [id number]  Today's Date: 8/23/2024  Time Calculation  Start Time: 1312  Stop Time: 1350  Time Calculation (min): 38 min        Assessment:  OT Assessment: Min assist of 2 with transfers, updated RN via secure chat. (Noted that patient was saturated in urine despite external catheter, updated PCA.)  End of Session Communication: Bedside nurse, PCT/NA/CTA  End of Session Patient Position: Up in chair, Alarm on     Plan:  Treatment Interventions: ADL retraining, Functional transfer training, Endurance training, UE strengthening/ROM  OT Frequency: 4 times per week  OT Discharge Recommendations: Moderate intensity level of continued care  OT Recommended Transfer Status: Assist of 2  OT - OK to Discharge: Yes ((when medically approp.))  Treatment Interventions: ADL retraining, Functional transfer training, Endurance training, UE strengthening/ROM    Subjective   Previous Visit Info:  OT Last Visit  OT Received On: 08/23/24  General:  General  Prior to Session Communication: Bedside nurse  Patient Position Received: Bed, 3 rail up, Alarm on  Precautions:  Medical Precautions: Fall precautions     Pain:  Pain Assessment  Pain Assessment: 0-10  0-10 (Numeric) Pain Score: 8 (generalized)    Objective    Cognition:  Cognition  Orientation Level: Oriented X4     Activities of Daily Living: Grooming  Grooming Level of Assistance: Minimum assistance  Grooming Where Assessed: Chair    UE Bathing  UE Bathing Level of Assistance: Minimum assistance  UE Bathing Where Assessed: Edge of bed    LE Bathing  LE Bathing Level of Assistance: Moderate assistance  LE Bathing Where Assessed: Edge of bed    UE Dressing  UE Dressing Level of Assistance: Maximum assistance  UE Dressing Where Assessed: Edge of bed (changed gown)    LE Dressing  LE Dressing: Yes  Adult Briefs Level of Assistance: Maximum assistance  LE Dressing Where Assessed: Chair     Bed  Mobility/Transfers: Bed Mobility  Bed Mobility: Yes  Bed Mobility 1  Bed Mobility 1: Supine to sitting  Level of Assistance 1: Minimum assistance, +2    Transfers  Transfer: Yes  Transfer 1  Transfer From 1: Bed to  Transfer to 1: Chair with arms  Transfer Device 1: Walker  Transfer Level of Assistance 1: Minimum assistance, +2  Transfers 2  Technique 2: Stand to sit, Sit to stand  Transfer Device 2: Walker  Transfer Level of Assistance 2: Minimum assistance, +2    Outcome Measures:[facility] Daily Activity  Putting on and taking off regular lower body clothing: A lot  Bathing (including washing, rinsing, drying): A lot  Putting on and taking off regular upper body clothing: A lot  Toileting, which includes using toilet, bedpan or urinal: A lot  Taking care of personal grooming such as brushing teeth: A little  Eating Meals: A little  Daily Activity - Total Score: 14    Education Documentation  ADL Training, taught by ELVIA Diego at 8/23/2024  4:07 PM.  Learner: Patient  Readiness: Acceptance  Method: Explanation, Demonstration  Response: Needs Reinforcement    Education Comments  No comments found.           Goals:  Encounter Problems       Encounter Problems (Active)       ADLs       Demo. UB bathing, grooming with Min.A while seated EOB.    (Progressing)       Start:  08/22/24    Expected End:  08/29/24               BALANCE       Leela. at least 1 min. X 2 CGA static stand with FWW.  (Progressing)       Start:  08/22/24    Expected End:  08/29/24               EXERCISE/STRENGTHENING       Leela. bilat. UE ex., all planes, 15 reps., while seated EOB.  (Progressing)       Start:  08/22/24    Expected End:  08/29/24

## 2024-08-23 NOTE — CARE PLAN
The patient's goals for the shift include      The clinical goals for the shift include patient will remain safe and free from falls during this shift      Problem: Skin  Goal: Decreased wound size/increased tissue granulation at next dressing change  Outcome: Progressing  Goal: Participates in plan/prevention/treatment measures  Outcome: Progressing  Goal: Prevent/manage excess moisture  Outcome: Progressing  Goal: Prevent/minimize sheer/friction injuries  Outcome: Progressing  Goal: Promote/optimize nutrition  Outcome: Progressing  Goal: Promote skin healing  Outcome: Progressing     Problem: Fall/Injury  Goal: Not fall by end of shift  Outcome: Progressing  Goal: Be free from injury by end of the shift  Outcome: Progressing  Goal: Verbalize understanding of personal risk factors for fall in the hospital  Outcome: Progressing  Goal: Verbalize understanding of risk factor reduction measures to prevent injury from fall in the home  Outcome: Progressing  Goal: Use assistive devices by end of the shift  Outcome: Progressing  Goal: Pace activities to prevent fatigue by end of the shift  Outcome: Progressing     Problem: Respiratory  Goal: Clear secretions with interventions this shift  Outcome: Progressing  Goal: Minimize anxiety/maximize coping throughout shift  Outcome: Progressing  Goal: Minimal/no exertional discomfort or dyspnea this shift  Outcome: Progressing  Goal: No signs of respiratory distress (eg. Use of accessory muscles. Peds grunting)  Outcome: Progressing  Goal: Patent airway maintained this shift  Outcome: Progressing  Goal: Tolerate mechanical ventilation evidenced by VS/agitation level this shift  Outcome: Progressing  Goal: Tolerate pulmonary toileting this shift  Outcome: Progressing  Goal: Verbalize decreased shortness of breath this shift  Outcome: Progressing  Goal: Wean oxygen to maintain O2 saturation per order/standard this shift  Outcome: Progressing  Goal: Increase self care and/or family  involvement in next 24 hours  Outcome: Progressing     Problem: Pain - Adult  Goal: Verbalizes/displays adequate comfort level or baseline comfort level  Outcome: Progressing     Problem: Safety - Adult  Goal: Free from fall injury  Outcome: Progressing     Problem: Discharge Planning  Goal: Discharge to home or other facility with appropriate resources  Outcome: Progressing     Problem: Chronic Conditions and Co-morbidities  Goal: Patient's chronic conditions and co-morbidity symptoms are monitored and maintained or improved  Outcome: Progressing

## 2024-08-23 NOTE — PROGRESS NOTES
SW following up with pt to start discharge planning. Per chart review, pt with AMPAC score of 6. Pt agreeable to dual diagnosis program and stated he wants helps with his alcohol abuse. Pt not forthcoming at this time about his drinking. SW provided Skilled nursing list from [facility] directory that includes facilities that are within  [facility], as well as meeting patient's medical needs, are in networks with patient's insurance; while also in discharge geographic area that patient prefers, and also identifies each facilities CMS star ratings. Pt stated he is unable to read list due to poor vision. Pt agreeable to SW sending [facility] referral. Referrals sent to [facility], [facility], [facility], [facility] and [facility]. SW also attempted to reach out to pt's Clarissa Rahman CM. Will await return call and continue to follow.    MARYA Garsia ([id number])   Care Transitions    1440  SW following up with pt with accepting facilities. [facility], [facility] and [facility] able to accept. SW explained each facilities CMS star rating and geographic area as pt unable to view list at this time. Pt chose [facility] as FOC. Pt stated he has been there in past and was able to smoke there. Pt was more agreeable to conversation. Explored pt's thoughts toward drinking behaviors, awareness, and willingness for tx. Pt reported he drinks a pint of vodka and 6 pack of beer daily. Pt stated he wants to stop as alcohol causes issues in his life. Pt did mention a period of sobriety for 2 years and states his job at the time helped him remain sober. SW inquired about pt's support system at home. Pt stated he lives alone but is connected with Josiah , Clarissa and [place] , Director, Marquis Dyer. Pt did  not identify any other SW concerns. SW to update care team and start auth.    MARYA Garsia ([id number])   Care Transitions

## 2024-08-23 NOTE — PROGRESS NOTES
Physical Therapy    Physical Therapy Treatment    Patient Name: Teddy García  MRN: [id number]  Today's Date: 8/23/2024  Time Calculation  Start Time: 1305  Stop Time: 1344  Time Calculation (min): 39 min    Assessment/Plan   PT Assessment  End of Session Communication: Bedside nurse  End of Session Patient Position: Up in chair, Alarm on     PT Plan  Treatment/Interventions: Bed mobility, Transfer training, Gait training, Strengthening, Endurance training, Balance training  PT Plan: Ongoing PT  PT Frequency: 4 times per week  PT Discharge Recommendations: Moderate intensity level of continued care  Equipment Recommended upon Discharge:  (TBD)  PT Recommended Transfer Status: Assist x2 (FWW)  PT - OK to Discharge: Yes (WHEN MEDICALLY CLEARED)    General Visit Information:   PT  Visit  PT Received On: 08/23/24  General  Prior to Session Communication: Bedside nurse  Patient Position Received: Bed, 3 rail up, Alarm on  General Comment: FLAT AFFECT, DELAYED RESPONSES, DRESSINGS ON MITESH KNEES    Subjective   Precautions:  Precautions  Medical Precautions: Fall precautions (DT's)    Objective   Pain:  Pain Assessment  Pain Assessment: 0-10  0-10 (Numeric) Pain Score:  (no rating)  Pain Location: Generalized  Cognition:  Cognition  Orientation Level: Oriented X4    Treatments:  Therapeutic Exercise  Therapeutic Exercise Performed: Yes  Therapeutic Exercise Activity 1: pt completed seated LE exercises: AP x 10 reps, LAQ x 10 reps, hip flexion x 10 reps, hip adduction x 10 reps    Therapeutic Activity  Therapeutic Activity Performed: Yes  Therapeutic Activity 1: pt sat EOB for 6.5 min with Min/CGA  Therapeutic Activity 2: pt completed a static stand for 2-2,5 min each with Min Ax2 to maintain    Bed Mobility  Bed Mobility: Yes  Bed Mobility 1  Bed Mobility 1: Supine to sitting  Level of Assistance 1: Minimum assistance, +2, Minimal verbal cues, Minimal tactile cues    Ambulation/Gait Training  Ambulation/Gait Training  Performed: Yes  Ambulation/Gait Training 1  Surface 1: Level tile  Device 1: Rolling walker  Assistance 1: Minimum assistance, Moderate verbal cues, Moderate tactile cues (+2)  Quality of Gait 1: Decreased step length, Shuffling gait, Diminished heel strike, Forward flexed posture  Comments/Distance (ft) 1: bed->chair  Transfers  Transfer: Yes  Transfer 1  Technique 1: Sit to stand, Stand to sit  Transfer Device 1: Walker  Transfer Level of Assistance 1: Minimum assistance, +2, Minimal verbal cues, Minimal tactile cues  Trials/Comments 1: cues for proper sequencing/hand placement    Outcome Measures:  [facility] Basic Mobility  Turning from your back to your side while in a flat bed without using bedrails: A lot  Moving from lying on your back to sitting on the side of a flat bed without using bedrails: A lot  Moving to and from bed to chair (including a wheelchair): A lot  Standing up from a chair using your arms (e.g. wheelchair or bedside chair): A lot  To walk in hospital room: Total  Climbing 3-5 steps with railing: Total  Basic Mobility - Total Score: 10    Education Documentation  Mobility Training, taught by Neha Sloan PTA at 8/23/2024  2:28 PM.  Learner: Patient  Readiness: Acceptance  Method: Explanation  Response: Needs Reinforcement    Education Comments  No comments found.        OP EDUCATION:       Encounter Problems       Encounter Problems (Active)       Mobility       STG - Patient will ambulate (Progressing)       Start:  08/22/24    Expected End:  09/05/24       FWW MIN A X1 100 FT         Goal 1 (Progressing)       Start:  08/22/24    Expected End:  09/12/24       20+ REPS RROM INCREASING STRENGTH TO STABILIZE GAIT            PT Transfers       STG - Transfer from bed to chair (Progressing)       Start:  08/22/24    Expected End:  08/28/24       FWW MIN A X1         STG - Patient to transfer to and from sit to supine (Progressing)       Start:  08/22/24    Expected End:  08/30/24       HOB FLAT  CGA NO RAILS         STG - Patient will transfer sit to and from stand (Progressing)       Start:  08/22/24    Expected End:  08/28/24       FWW USING PROPER TECHNIQUE MIN A X1            Pain - Adult

## 2024-08-23 NOTE — PROGRESS NOTES
Teddy García is a 63 y.o. male on day 2 of admission presenting with Alcohol withdrawal syndrome without complication (Multi).      Subjective   Teddy García is a 63 y.o. male with PMHx s/f HTN (no longer on meds), prediabetes (A1c 5.8%), prior PE on Eliquis, prior OM of the L great toe s/p amputation, GERD, bipolar disorder, anxiety, depression, alcohol abuse abuse with h/o withdrawal seizures / DTs, tobacco use disorder, chronic gait instability (requires a cane at baseline), presenting with wishes to undergo treatment for alcohol abuse. Pt is very flat with his responses and a difficult historian. He reports he presented to the emergency department today with wishes to undergo treatment of his alcohol use disorder, not wanting to provide much additional insight into this. Reports he is still drinking about 1 pint of vodka daily and a six pack of beer, with his last drinking being in the morning today (08/21/2024). He was initially evaluated in the ED and discharged up to [place]; however, they brought him back down to the ED (his belongings were returned in bags during my exam) after they informed the emergency department that he was not able to participate in their services because he would need to be able to be ambulatory without assistive devices as they do not have the capabilities to assist with ambulation there. On my evaluation, the patient reports generalized weakness and aches / pains where are at baseline. He admits to falling on the way to his couch (got tripped up) a few days ago and that he scraped his knees in the process. He reports compliance with his Eliquis and the only missed dose was this morning d/t being in the ED. He denies CP/pressure, palpitations, SOB, HWANG, HA, vision changes, f/c/n/v/d/abd pain, focal and/or lateralizing sensory or motor deficits. He was resting comfortably watching TV in the ED.      ED Course (Summary):   Vitals on presentation: T 98.1, /86, HR 96 > 74, R  18, SpO2 96% RA  Labs: CBC without abnormalities. CMP with glucose 118, Na 133, K 3.3, BUN 5, sCr 0.53, LFTs without notable abnormalities. Alcohol 50 (from 1349 this afternoon).   Imaging: CTH - no acute process, evidence of prior infarcts (remote deep lacunar infarct involving the white matter of the right frontal lobe. 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 There is a small remote left cerebellar infarct.)   Interventions: 20mEq Kcl replacement and initial discharge to [place]; followed by repeat evaluation + phenobarb 130IV x1, ativan IV per protocol, thiamine/mvi/folate, admitted to medicine      PMHx: As above   Surgical Hx: Tonsillectomy, ORIF left arm fracture, amputation of L great toe  Social Hx: Smoking 1ppd x decades; not motivated to quit. Drinking ~1 pint of vodka and 1 six pack of beer daily. Single, lives alone.   Family Hx: Reviewed and found to be noncontributory to presenting complaint    8/22- No acute events overnight. Patient sitting up in bed eating breakfast. Notable left sided weakness/nonuse. Would like to participate in a detox program for alcohol use disorder. Discussed case with SW and TCC in hope of finding him an appropriate program. PRN Ativan if CIWA score indicates. Will continue to supplement electrolytes.    8/23: No acute events overnight. Left side still notably weaker than the right. Patient would benefit from from rehab due to the weakness, and due to the fact he's living alone and needing a 2 person assist per PT/OT. Potassium up to 4.1, sodium still a little low. Will continue to follow.             Objective     Last Recorded Vitals  /78 (BP Location: Left arm, Patient Position: Lying)   Pulse 93   Temp 37.1 °C (98.8 °F) (Temporal)   Resp 17   Wt 97.5 kg (215 lb)   SpO2 94%   Intake/Output last 3 Shifts:    Intake/Output Summary (Last 24 hours) at 8/23/2024 1109  Last data filed at 8/23/2024 0919  Gross per 24 hour   Intake 1306.66 ml   Output 1200 ml   Net  106.66 ml       Admission Weight  Weight: 97.5 kg (215 lb) (08/21/24 1259)    Daily Weight  08/21/24 : 97.5 kg (215 lb)    Image Results  CT head wo IV contrast  Narrative: Interpreted By:  Yudy Baca,   STUDY:  CT HEAD WO IV CONTRAST;  8/21/2024 2:54 pm      INDICATION:  Signs/Symptoms:AMS on eliquis, hx EtOH abuse, unknown if fall.      COMPARISON:  06/04/2024      ACCESSION NUMBER(S):  [id number]      ORDERING CLINICIAN:  KELLY MAHONEY      TECHNIQUE:  Examination was performed in the axial plane using soft tissue and  bone algorithm.      FINDINGS:  INTRACRANIAL:  There is prominence of the ventricular system and cerebral sulci  consistent with cerebral atrophy. There are periventricular  hypodensities consistent with  moderate small vessel disease. No mass  or mass effect is identified. There is no hemorrhage or subdural  fluid collection. There is no acute infarct.  There is a remote deep lacunar infarct involving the white matter of  the right frontal lobe. 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  There is a small remote left cerebellar infarct.  There is no fracture of the calvarium.      EXTRACRANIAL:  Visualized paranasal sinuses and mastoids are clear.      Impression: No acute intracranial pathology.      MACRO:  None      Signed by: Yudy Baca 8/21/2024 3:16 PM  Dictation workstation:   [id number]  ECG 12 lead  Sinus rhythm  Multiple premature complexes, vent & supraven  Borderline prolonged QT interval      Physical Exam  Constitutional:       Appearance: He is ill-appearing.   HENT:      Head: Normocephalic.   Cardiovascular:      Rate and Rhythm: Normal rate and regular rhythm.      Heart sounds: No murmur heard.     No gallop.   Pulmonary:      Effort: Pulmonary effort is normal. No respiratory distress.      Breath sounds: Normal breath sounds. No wheezing.   Abdominal:      General: There is no distension.      Palpations: There is no mass.      Tenderness: There is no abdominal  tenderness. There is no right CVA tenderness or left CVA tenderness.   Musculoskeletal:         General: Deformity present.      Cervical back: No rigidity.      Right lower leg: No edema.      Left lower leg: No edema.      Comments: Hx of L great toe OM s/p amputation    Skin:     Findings: Lesion present.      Comments: Bilateral Knees   Neurological:      Motor: Weakness present.      Gait: Gait abnormal.         Relevant Results  Scheduled medications  apixaban, 5 mg, oral, BID  escitalopram, 20 mg, oral, Daily  folic acid, 1 mg, oral, Daily  metFORMIN, 500 mg, oral, Daily with breakfast  multivitamin with minerals, 1 tablet, oral, Daily  nicotine, 1 patch, transdermal, q24h  OLANZapine, 5 mg, oral, BID  pantoprazole, 40 mg, oral, Daily before breakfast   Or  pantoprazole, 40 mg, intravenous, Daily before breakfast  polyethylene glycol, 17 g, oral, Daily  thiamine, 100 mg, oral, Daily  thiamine, 100 mg, intravenous, Daily  traZODone, 100 mg, oral, Nightly      Continuous medications     PRN medications  PRN medications: acetaminophen, bisacodyl, bisacodyl, guaiFENesin, hydrOXYzine HCL, LORazepam **OR** LORazepam, melatonin, ondansetron **OR** ondansetron  Results for orders placed or performed during the hospital encounter of 08/21/24 (from the past 24 hour(s))   Comprehensive Metabolic Panel   Result Value Ref Range    Glucose 164 (H) 74 - 99 mg/dL    Sodium 134 (L) 136 - 145 mmol/L    Potassium 4.1 3.5 - 5.3 mmol/L    Chloride 102 98 - 107 mmol/L    Bicarbonate 27 21 - 32 mmol/L    Anion Gap 9 (L) 10 - 20 mmol/L    Urea Nitrogen 13 6 - 23 mg/dL    Creatinine 0.58 0.50 - 1.30 mg/dL    eGFR >90 >60 mL/min/1.73m*2    Calcium 8.4 (L) 8.6 - 10.3 mg/dL    Albumin 3.1 (L) 3.4 - 5.0 g/dL    Alkaline Phosphatase 61 33 - 136 U/L    Total Protein 7.1 6.4 - 8.2 g/dL    AST 21 9 - 39 U/L    Bilirubin, Total 0.5 0.0 - 1.2 mg/dL    ALT 12 10 - 52 U/L   CBC   Result Value Ref Range    WBC 4.7 4.4 - 11.3 x10*3/uL    nRBC 0.0  0.0 - 0.0 /100 WBCs    RBC 3.94 (L) 4.50 - 5.90 x10*6/uL    Hemoglobin 12.3 (L) 13.5 - 17.5 g/dL    Hematocrit 36.3 (L) 41.0 - 52.0 %    MCV 92 80 - 100 fL    MCH 31.2 26.0 - 34.0 pg    MCHC 33.9 32.0 - 36.0 g/dL    RDW 14.6 (H) 11.5 - 14.5 %    Platelets 189 150 - 450 x10*3/uL   Magnesium   Result Value Ref Range    Magnesium 1.62 1.60 - 2.40 mg/dL     CT head wo IV contrast    Result Date: 8/21/2024  Interpreted By:  Yudy Baca, STUDY: CT HEAD WO IV CONTRAST;  8/21/2024 2:54 pm   INDICATION: Signs/Symptoms:AMS on eliquis, hx EtOH abuse, unknown if fall.   COMPARISON: 06/04/2024   ACCESSION NUMBER(S): [id number]   ORDERING CLINICIAN: KELLY MAHONEY   TECHNIQUE: Examination was performed in the axial plane using soft tissue and bone algorithm.   FINDINGS: INTRACRANIAL: There is prominence of the ventricular system and cerebral sulci consistent with cerebral atrophy. There are periventricular hypodensities consistent with  moderate small vessel disease. No mass or mass effect is identified. There is no hemorrhage or subdural fluid collection. There is no acute infarct. There is a remote deep lacunar infarct involving the white matter of the right frontal lobe. 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 There is a small remote left cerebellar infarct. There is no fracture of the calvarium.   EXTRACRANIAL: Visualized paranasal sinuses and mastoids are clear.       No acute intracranial pathology.   MACRO: None   Signed by: Yudy Baca 8/21/2024 3:16 PM Dictation workstation:   [id number]    ECG 12 lead    Result Date: 8/21/2024  Sinus rhythm Multiple premature complexes, vent & supraven Borderline prolonged QT interval          Assessment/Plan      Assessment & Plan    Alcohol abuse with impending withdrawal  History of withdrawal seizures, DTs  -Alcohol level on 8/21/24 was 50.  -Given IV loading dose of phenobarb in the ED.   -Will continue on prn IV ativan for now.   -CIWA protocol   -Thiamine, MVI,  folate   -SW consultation appreciated   -Patient agreeable to detox program   -8/23: no signs or symptoms of withdrawal, medically stable, continue to monitor-discharge planning to [facility] for rehab-awaiting approval     Hypokalemia  Hyponatremia (chronic)   -K 3.3 > given replacement in the ED > will recheck   -Check mag   -Likely in setting of above   -Na 133, baseline appears to be lower 130s. Continue to follow.   -8/22: K of 3.4, Na of 133, will continue to follow and supplement as needed.  -8/23: K of 4.1, Na of 134, will continue to follow and supplement as needed     Generalized weakness  Chronic gait instability requiring use of cane as assistive device   -Denies acute worsening of this; no acute focal or lateralizing deficits   -PT/OT  -CTH without acute process; does note prior evidence of strokes. When asked, patient declined wishing for additional evaluation into this at this time.   -8/22: Continue PT/OT, up with assist.  -8/23: 2 person assist with PT and OT, rehab facility recommended     Tobacco Use Disorder   -Smoking cessation education provided during hospitalization   -NRT discussed and offered, does want a patch  -Patient not motivated to quit      History of PE   -Had an acute saddle PE earlier this summer   -He was able to list Eliquis and told me he is taking it as prescribed   -There is no current unilateral edema, CP/pressure, palpitations, SOB. Doing well on RA. No bleeding on CTH.   -Resume Eliquis; fall precautions      GERD  -Continue PPI      Bipolar disorder, anxiety, depression   -Continue home medications           ZEENAT MORGAN    I am the supervising physician in the management of the patient. I have evaluated patient independently,  reviewed and agree with student's documented note. I have edited the above assessment/plan to reflect my final impressions and plans.  Nicko Cook MD  8/23/2024  2:38 PM

## 2024-08-24 PROCEDURE — 1200000002 HC GENERAL ROOM WITH TELEMETRY DAILY

## 2024-08-24 PROCEDURE — 2500000002 HC RX 250 W HCPCS SELF ADMINISTERED DRUGS (ALT 637 FOR MEDICARE OP, ALT 636 FOR OP/ED): Performed by: STUDENT IN AN ORGANIZED HEALTH CARE EDUCATION/TRAINING PROGRAM

## 2024-08-24 PROCEDURE — 2500000001 HC RX 250 WO HCPCS SELF ADMINISTERED DRUGS (ALT 637 FOR MEDICARE OP): Performed by: STUDENT IN AN ORGANIZED HEALTH CARE EDUCATION/TRAINING PROGRAM

## 2024-08-24 PROCEDURE — 2500000002 HC RX 250 W HCPCS SELF ADMINISTERED DRUGS (ALT 637 FOR MEDICARE OP, ALT 636 FOR OP/ED): Performed by: INTERNAL MEDICINE

## 2024-08-24 PROCEDURE — 2500000004 HC RX 250 GENERAL PHARMACY W/ HCPCS (ALT 636 FOR OP/ED): Performed by: STUDENT IN AN ORGANIZED HEALTH CARE EDUCATION/TRAINING PROGRAM

## 2024-08-24 PROCEDURE — S4991 NICOTINE PATCH NONLEGEND: HCPCS | Performed by: STUDENT IN AN ORGANIZED HEALTH CARE EDUCATION/TRAINING PROGRAM

## 2024-08-24 PROCEDURE — 99233 SBSQ HOSP IP/OBS HIGH 50: CPT | Performed by: INTERNAL MEDICINE

## 2024-08-24 ASSESSMENT — LIFESTYLE VARIABLES
PAROXYSMAL SWEATS: NO SWEAT VISIBLE
AUDITORY DISTURBANCES: NOT PRESENT
AGITATION: NORMAL ACTIVITY
VISUAL DISTURBANCES: NOT PRESENT
PAROXYSMAL SWEATS: NO SWEAT VISIBLE
PAROXYSMAL SWEATS: NO SWEAT VISIBLE
ORIENTATION AND CLOUDING OF SENSORIUM: ORIENTED AND CAN DO SERIAL ADDITIONS
PULSE: 85
AGITATION: NORMAL ACTIVITY
HEADACHE, FULLNESS IN HEAD: VERY MILD
VISUAL DISTURBANCES: NOT PRESENT
AUDITORY DISTURBANCES: NOT PRESENT
ANXIETY: NO ANXIETY, AT EASE
ANXIETY: NO ANXIETY, AT EASE
ORIENTATION AND CLOUDING OF SENSORIUM: ORIENTED AND CAN DO SERIAL ADDITIONS
HEADACHE, FULLNESS IN HEAD: NOT PRESENT
NAUSEA AND VOMITING: NO NAUSEA AND NO VOMITING
TREMOR: NO TREMOR
NAUSEA AND VOMITING: NO NAUSEA AND NO VOMITING
ORIENTATION AND CLOUDING OF SENSORIUM: ORIENTED AND CAN DO SERIAL ADDITIONS
NAUSEA AND VOMITING: NO NAUSEA AND NO VOMITING
HEADACHE, FULLNESS IN HEAD: NOT PRESENT
TREMOR: NO TREMOR
ANXIETY: NO ANXIETY, AT EASE
AGITATION: NORMAL ACTIVITY
AGITATION: NORMAL ACTIVITY
NAUSEA AND VOMITING: NO NAUSEA AND NO VOMITING
VISUAL DISTURBANCES: NOT PRESENT
PAROXYSMAL SWEATS: NO SWEAT VISIBLE
ANXIETY: NO ANXIETY, AT EASE
TOTAL SCORE: 0
PULSE: 72
PAROXYSMAL SWEATS: NO SWEAT VISIBLE
TOTAL SCORE: 1
TOTAL SCORE: 2
NAUSEA AND VOMITING: NO NAUSEA AND NO VOMITING
TREMOR: NO TREMOR
PAROXYSMAL SWEATS: NO SWEAT VISIBLE
AGITATION: NORMAL ACTIVITY
TREMOR: NO TREMOR
HEADACHE, FULLNESS IN HEAD: NOT PRESENT
ORIENTATION AND CLOUDING OF SENSORIUM: ORIENTED AND CAN DO SERIAL ADDITIONS
TREMOR: NO TREMOR
VISUAL DISTURBANCES: NOT PRESENT
ORIENTATION AND CLOUDING OF SENSORIUM: ORIENTED AND CAN DO SERIAL ADDITIONS
ORIENTATION AND CLOUDING OF SENSORIUM: ORIENTED AND CAN DO SERIAL ADDITIONS
TOTAL SCORE: 0
TOTAL SCORE: 0
VISUAL DISTURBANCES: NOT PRESENT
TREMOR: NO TREMOR
ANXIETY: NO ANXIETY, AT EASE
HEADACHE, FULLNESS IN HEAD: MILD
ANXIETY: NO ANXIETY, AT EASE
VISUAL DISTURBANCES: NOT PRESENT
HEADACHE, FULLNESS IN HEAD: NOT PRESENT
AUDITORY DISTURBANCES: NOT PRESENT
AUDITORY DISTURBANCES: NOT PRESENT
TOTAL SCORE: 0
NAUSEA AND VOMITING: NO NAUSEA AND NO VOMITING
AGITATION: NORMAL ACTIVITY

## 2024-08-24 ASSESSMENT — COGNITIVE AND FUNCTIONAL STATUS - GENERAL
MOVING TO AND FROM BED TO CHAIR: TOTAL
DAILY ACTIVITIY SCORE: 14
STANDING UP FROM CHAIR USING ARMS: TOTAL
PERSONAL GROOMING: A LITTLE
MOVING FROM LYING ON BACK TO SITTING ON SIDE OF FLAT BED WITH BEDRAILS: A LITTLE
DRESSING REGULAR UPPER BODY CLOTHING: A LOT
HELP NEEDED FOR BATHING: TOTAL
TURNING FROM BACK TO SIDE WHILE IN FLAT BAD: A LOT
TOILETING: A LITTLE
MOBILITY SCORE: 9
DRESSING REGULAR LOWER BODY CLOTHING: A LOT
CLIMB 3 TO 5 STEPS WITH RAILING: TOTAL
EATING MEALS: A LITTLE
WALKING IN HOSPITAL ROOM: TOTAL

## 2024-08-24 ASSESSMENT — PAIN SCALES - GENERAL
PAINLEVEL_OUTOF10: 5 - MODERATE PAIN
PAINLEVEL_OUTOF10: 8

## 2024-08-24 ASSESSMENT — PAIN - FUNCTIONAL ASSESSMENT
PAIN_FUNCTIONAL_ASSESSMENT: 0-10
PAIN_FUNCTIONAL_ASSESSMENT: 0-10

## 2024-08-24 ASSESSMENT — PAIN DESCRIPTION - DESCRIPTORS: DESCRIPTORS: ACHING

## 2024-08-24 NOTE — CARE PLAN
The patient's goals for the shift include      The clinical goals for the shift include Patient will remain safe and free from falls during this shift  Over the shift, the patient

## 2024-08-24 NOTE — PROGRESS NOTES
Teddy García is a 63 y.o. male on day 3 of admission presenting with Alcohol withdrawal syndrome without complication (Multi).      Subjective   Teddy García is a 63 y.o. male with PMHx s/f HTN (no longer on meds), prediabetes (A1c 5.8%), prior PE on Eliquis, prior OM of the L great toe s/p amputation, GERD, bipolar disorder, anxiety, depression, alcohol abuse abuse with h/o withdrawal seizures / DTs, tobacco use disorder, chronic gait instability (requires a cane at baseline), presenting with wishes to undergo treatment for alcohol abuse. Pt is very flat with his responses and a difficult historian. He reports he presented to the emergency department today with wishes to undergo treatment of his alcohol use disorder, not wanting to provide much additional insight into this. Reports he is still drinking about 1 pint of vodka daily and a six pack of beer, with his last drinking being in the morning today (08/21/2024). He was initially evaluated in the ED and discharged up to [place]; however, they brought him back down to the ED (his belongings were returned in bags during my exam) after they informed the emergency department that he was not able to participate in their services because he would need to be able to be ambulatory without assistive devices as they do not have the capabilities to assist with ambulation there. On my evaluation, the patient reports generalized weakness and aches / pains where are at baseline. He admits to falling on the way to his couch (got tripped up) a few days ago and that he scraped his knees in the process. He reports compliance with his Eliquis and the only missed dose was this morning d/t being in the ED. He denies CP/pressure, palpitations, SOB, HWANG, HA, vision changes, f/c/n/v/d/abd pain, focal and/or lateralizing sensory or motor deficits. He was resting comfortably watching TV in the ED.      ED Course (Summary):   Vitals on presentation: T 98.1, /86, HR 96 > 74, R  18, SpO2 96% RA  Labs: CBC without abnormalities. CMP with glucose 118, Na 133, K 3.3, BUN 5, sCr 0.53, LFTs without notable abnormalities. Alcohol 50 (from 1349 this afternoon).   Imaging: CTH - no acute process, evidence of prior infarcts (remote deep lacunar infarct involving the white matter of the right frontal lobe. 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 There is a small remote left cerebellar infarct.)   Interventions: 20mEq Kcl replacement and initial discharge to [place]; followed by repeat evaluation + phenobarb 130IV x1, ativan IV per protocol, thiamine/mvi/folate, admitted to medicine      PMHx: As above   Surgical Hx: Tonsillectomy, ORIF left arm fracture, amputation of L great toe  Social Hx: Smoking 1ppd x decades; not motivated to quit. Drinking ~1 pint of vodka and 1 six pack of beer daily. Single, lives alone.   Family Hx: Reviewed and found to be noncontributory to presenting complaint    8/22- No acute events overnight. Patient sitting up in bed eating breakfast. Notable left sided weakness/nonuse. Would like to participate in a detox program for alcohol use disorder. Discussed case with SW and TCC in hope of finding him an appropriate program. PRN Ativan if CIWA score indicates. Will continue to supplement electrolytes.    8/23: No acute events overnight. Left side still notably weaker than the right. Patient would benefit from from rehab due to the weakness, and due to the fact he's living alone and needing a 2 person assist per PT/OT. Potassium up to 4.1, sodium still a little low. Will continue to follow.  08/24: Patient was evaluated this morning, denies having active complaint, no fever or chills, no nausea vomiting.             Objective     Last Recorded Vitals  /78 (BP Location: Left arm, Patient Position: Lying)   Pulse 86   Temp 36.4 °C (97.5 °F) (Temporal)   Resp 18   Wt 97.5 kg (215 lb)   SpO2 96%   Intake/Output last 3 Shifts:    Intake/Output Summary (Last 24 hours) at  8/24/2024 1341  Last data filed at 8/24/2024 1300  Gross per 24 hour   Intake 680 ml   Output 1400 ml   Net -720 ml       Admission Weight  Weight: 97.5 kg (215 lb) (08/21/24 1259)    Daily Weight  08/21/24 : 97.5 kg (215 lb)    Image Results  ECG 12 lead  Sinus rhythm  Multiple premature complexes, vent & supraven  Borderline prolonged QT interval    See ED provider note for full interpretation and clinical correlation  Confirmed by Shantelle Soto ([id number]) on 8/23/2024 9:39:55 PM      Physical Exam  Constitutional:       Appearance: He is ill-appearing.   HENT:      Head: Normocephalic.   Cardiovascular:      Rate and Rhythm: Normal rate and regular rhythm.      Heart sounds: No murmur heard.     No gallop.   Pulmonary:      Effort: Pulmonary effort is normal. No respiratory distress.      Breath sounds: Normal breath sounds. No wheezing.   Abdominal:      General: There is no distension.      Palpations: There is no mass.      Tenderness: There is no abdominal tenderness. There is no right CVA tenderness or left CVA tenderness.   Musculoskeletal:         General: Deformity present.      Cervical back: No rigidity.      Right lower leg: No edema.      Left lower leg: No edema.      Comments: Hx of L great toe OM s/p amputation    Skin:     Findings: Lesion present.      Comments: Bilateral Knees   Neurological:      Motor: Weakness present.      Gait: Gait abnormal.         Relevant Results  Scheduled medications  apixaban, 5 mg, oral, BID  escitalopram, 20 mg, oral, Daily  folic acid, 1 mg, oral, Daily  metFORMIN, 500 mg, oral, Daily with breakfast  multivitamin with minerals, 1 tablet, oral, Daily  nicotine, 1 patch, transdermal, q24h  OLANZapine, 5 mg, oral, BID  pantoprazole, 40 mg, oral, Daily before breakfast   Or  pantoprazole, 40 mg, intravenous, Daily before breakfast  polyethylene glycol, 17 g, oral, Daily  thiamine, 100 mg, oral, Daily  traZODone, 100 mg, oral, Nightly      Continuous medications     PRN  medications  PRN medications: acetaminophen, bisacodyl, bisacodyl, guaiFENesin, hydrOXYzine HCL, LORazepam **OR** LORazepam, melatonin, ondansetron **OR** ondansetron  No results found for this or any previous visit (from the past 24 hour(s)).    CT head wo IV contrast    Result Date: 8/21/2024  Interpreted By:  Yudy Baca, STUDY: CT HEAD WO IV CONTRAST;  8/21/2024 2:54 pm   INDICATION: Signs/Symptoms:AMS on eliquis, hx EtOH abuse, unknown if fall.   COMPARISON: 06/04/2024   ACCESSION NUMBER(S): [id number]   ORDERING CLINICIAN: KELLY MAHONEY   TECHNIQUE: Examination was performed in the axial plane using soft tissue and bone algorithm.   FINDINGS: INTRACRANIAL: There is prominence of the ventricular system and cerebral sulci consistent with cerebral atrophy. There are periventricular hypodensities consistent with  moderate small vessel disease. No mass or mass effect is identified. There is no hemorrhage or subdural fluid collection. There is no acute infarct. There is a remote deep lacunar infarct involving the white matter of the right frontal lobe. 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 There is a small remote left cerebellar infarct. There is no fracture of the calvarium.   EXTRACRANIAL: Visualized paranasal sinuses and mastoids are clear.       No acute intracranial pathology.   MACRO: None   Signed by: Yudy Baca 8/21/2024 3:16 PM Dictation workstation:   [id number]    ECG 12 lead    Result Date: 8/21/2024  Sinus rhythm Multiple premature complexes, vent & supraven Borderline prolonged QT interval          Assessment/Plan      Assessment & Plan    Alcohol abuse with impending withdrawal  History of withdrawal seizures, DTs  -Alcohol level on 8/21/24 was 50.  -Given IV loading dose of phenobarb in the ED.   -Will continue on prn IV ativan for now.   -CIWA protocol   -Thiamine, MVI, folate   - consultation appreciated   -Patient agreeable to detox program   -no signs or symptoms of  withdrawal, medically stable, continue to monitor-discharge planning to [facility] for rehab-awaiting approval     Hypokalemia  Hyponatremia (chronic)   -K 3.3 > given replacement in the ED > will recheck   -Check mag   -Likely in setting of above   -Na 133, baseline appears to be lower 130s. Continue to follow.     Generalized weakness  Chronic gait instability requiring use of cane as assistive device   -Denies acute worsening of this; no acute focal or lateralizing deficits   -PT/OT  -CTH without acute process; does note prior evidence of strokes. When asked, patient declined wishing for additional evaluation into this at this time.   -Continue PT/OT, up with assist.  -2 person assist with PT and OT, rehab facility recommended     Tobacco Use Disorder   -Smoking cessation education provided during hospitalization   -NRT discussed and offered, does want a patch  -Patient not motivated to quit      History of PE   -Had an acute saddle PE earlier this summer   -He was able to list Eliquis and told me he is taking it as prescribed   -There is no current unilateral edema, CP/pressure, palpitations, SOB. Doing well on RA. No bleeding on CTH.   -Resume Eliquis; fall precautions      GERD  -Continue PPI      Bipolar disorder, anxiety, depression   -Continue home medications           Nicko Cook MD

## 2024-08-25 VITALS
HEIGHT: 76 IN | WEIGHT: 215 LBS | HEART RATE: 92 BPM | DIASTOLIC BLOOD PRESSURE: 86 MMHG | OXYGEN SATURATION: 96 % | BODY MASS INDEX: 26.18 KG/M2 | TEMPERATURE: 98.4 F | SYSTOLIC BLOOD PRESSURE: 129 MMHG | RESPIRATION RATE: 19 BRPM

## 2024-08-25 PROCEDURE — 1210000001 HC SEMI-PRIVATE ROOM DAILY

## 2024-08-25 PROCEDURE — 2500000001 HC RX 250 WO HCPCS SELF ADMINISTERED DRUGS (ALT 637 FOR MEDICARE OP): Performed by: STUDENT IN AN ORGANIZED HEALTH CARE EDUCATION/TRAINING PROGRAM

## 2024-08-25 PROCEDURE — S4991 NICOTINE PATCH NONLEGEND: HCPCS | Performed by: STUDENT IN AN ORGANIZED HEALTH CARE EDUCATION/TRAINING PROGRAM

## 2024-08-25 PROCEDURE — 2500000002 HC RX 250 W HCPCS SELF ADMINISTERED DRUGS (ALT 637 FOR MEDICARE OP, ALT 636 FOR OP/ED): Performed by: STUDENT IN AN ORGANIZED HEALTH CARE EDUCATION/TRAINING PROGRAM

## 2024-08-25 PROCEDURE — 97116 GAIT TRAINING THERAPY: CPT | Mod: CQ,GP | Performed by: PHYSICAL THERAPY ASSISTANT

## 2024-08-25 PROCEDURE — 2500000002 HC RX 250 W HCPCS SELF ADMINISTERED DRUGS (ALT 637 FOR MEDICARE OP, ALT 636 FOR OP/ED): Performed by: INTERNAL MEDICINE

## 2024-08-25 PROCEDURE — 99233 SBSQ HOSP IP/OBS HIGH 50: CPT | Performed by: INTERNAL MEDICINE

## 2024-08-25 ASSESSMENT — COGNITIVE AND FUNCTIONAL STATUS - GENERAL
DRESSING REGULAR UPPER BODY CLOTHING: A LOT
MOVING TO AND FROM BED TO CHAIR: A LOT
HELP NEEDED FOR BATHING: TOTAL
MOVING TO AND FROM BED TO CHAIR: A LOT
HELP NEEDED FOR BATHING: TOTAL
MOVING FROM LYING ON BACK TO SITTING ON SIDE OF FLAT BED WITH BEDRAILS: A LITTLE
DAILY ACTIVITIY SCORE: 14
EATING MEALS: A LITTLE
PERSONAL GROOMING: A LITTLE
WALKING IN HOSPITAL ROOM: A LOT
STANDING UP FROM CHAIR USING ARMS: A LITTLE
DRESSING REGULAR LOWER BODY CLOTHING: A LOT
WALKING IN HOSPITAL ROOM: A LOT
MOBILITY SCORE: 14
DRESSING REGULAR UPPER BODY CLOTHING: A LOT
CLIMB 3 TO 5 STEPS WITH RAILING: TOTAL
MOBILITY SCORE: 14
STANDING UP FROM CHAIR USING ARMS: A LITTLE
MOVING FROM LYING ON BACK TO SITTING ON SIDE OF FLAT BED WITH BEDRAILS: A LITTLE
MOBILITY SCORE: 14
CLIMB 3 TO 5 STEPS WITH RAILING: TOTAL
TURNING FROM BACK TO SIDE WHILE IN FLAT BAD: A LITTLE
TURNING FROM BACK TO SIDE WHILE IN FLAT BAD: A LITTLE
MOVING FROM LYING ON BACK TO SITTING ON SIDE OF FLAT BED WITH BEDRAILS: A LITTLE
STANDING UP FROM CHAIR USING ARMS: A LITTLE
TOILETING: A LITTLE
DAILY ACTIVITIY SCORE: 14
TURNING FROM BACK TO SIDE WHILE IN FLAT BAD: A LITTLE
PERSONAL GROOMING: A LITTLE
CLIMB 3 TO 5 STEPS WITH RAILING: TOTAL
MOVING TO AND FROM BED TO CHAIR: A LOT
WALKING IN HOSPITAL ROOM: A LOT
EATING MEALS: A LITTLE
TOILETING: A LITTLE
DRESSING REGULAR LOWER BODY CLOTHING: A LOT

## 2024-08-25 ASSESSMENT — LIFESTYLE VARIABLES
VISUAL DISTURBANCES: NOT PRESENT
PAROXYSMAL SWEATS: NO SWEAT VISIBLE
AGITATION: NORMAL ACTIVITY
ORIENTATION AND CLOUDING OF SENSORIUM: ORIENTED AND CAN DO SERIAL ADDITIONS
AGITATION: NORMAL ACTIVITY
ANXIETY: NO ANXIETY, AT EASE
VISUAL DISTURBANCES: NOT PRESENT
PAROXYSMAL SWEATS: NO SWEAT VISIBLE
ORIENTATION AND CLOUDING OF SENSORIUM: ORIENTED AND CAN DO SERIAL ADDITIONS
HEADACHE, FULLNESS IN HEAD: NOT PRESENT
NAUSEA AND VOMITING: NO NAUSEA AND NO VOMITING
AUDITORY DISTURBANCES: NOT PRESENT
AUDITORY DISTURBANCES: NOT PRESENT
NAUSEA AND VOMITING: NO NAUSEA AND NO VOMITING
ANXIETY: NO ANXIETY, AT EASE
TOTAL SCORE: 0
TOTAL SCORE: 0
HEADACHE, FULLNESS IN HEAD: NOT PRESENT
TREMOR: NO TREMOR
TREMOR: NO TREMOR

## 2024-08-25 ASSESSMENT — PAIN SCALES - GENERAL
PAINLEVEL_OUTOF10: 0 - NO PAIN
PAINLEVEL_OUTOF10: 0 - NO PAIN

## 2024-08-25 NOTE — PROGRESS NOTES
Teddy García is a 63 y.o. male on day 4 of admission presenting with Alcohol withdrawal syndrome without complication (Multi).      Subjective   Teddy García is a 63 y.o. male with PMHx s/f HTN (no longer on meds), prediabetes (A1c 5.8%), prior PE on Eliquis, prior OM of the L great toe s/p amputation, GERD, bipolar disorder, anxiety, depression, alcohol abuse abuse with h/o withdrawal seizures / DTs, tobacco use disorder, chronic gait instability (requires a cane at baseline), presenting with wishes to undergo treatment for alcohol abuse. Pt is very flat with his responses and a difficult historian. He reports he presented to the emergency department today with wishes to undergo treatment of his alcohol use disorder, not wanting to provide much additional insight into this. Reports he is still drinking about 1 pint of vodka daily and a six pack of beer, with his last drinking being in the morning today (08/21/2024). He was initially evaluated in the ED and discharged up to [facility]; however, they brought him back down to the ED (his belongings were returned in bags during my exam) after they informed the emergency department that he was not able to participate in their services because he would need to be able to be ambulatory without assistive devices as they do not have the capabilities to assist with ambulation there. On my evaluation, the patient reports generalized weakness and aches / pains where are at baseline. He admits to falling on the way to his couch (got tripped up) a few days ago and that he scraped his knees in the process. He reports compliance with his Eliquis and the only missed dose was this morning d/t being in the ED. He denies CP/pressure, palpitations, SOB, HWANG, HA, vision changes, f/c/n/v/d/abd pain, focal and/or lateralizing sensory or motor deficits. He was resting comfortably watching TV in the ED.      ED Course (Summary):   Vitals on presentation: T 98.1, /86, HR 96 > 74, R  18, SpO2 96% RA  Labs: CBC without abnormalities. CMP with glucose 118, Na 133, K 3.3, BUN 5, sCr 0.53, LFTs without notable abnormalities. Alcohol 50 (from 1349 this afternoon).   Imaging: CTH - no acute process, evidence of prior infarcts (remote deep lacunar infarct involving the white matter of the right frontal lobe. 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 There is a small remote left cerebellar infarct.)   Interventions: 20mEq Kcl replacement and initial discharge to [facility]; followed by repeat evaluation + phenobarb 130IV x1, ativan IV per protocol, thiamine/mvi/folate, admitted to medicine      PMHx: As above   Surgical Hx: Tonsillectomy, ORIF left arm fracture, amputation of L great toe  Social Hx: Smoking 1ppd x decades; not motivated to quit. Drinking ~1 pint of vodka and 1 six pack of beer daily. Single, lives alone.   Family Hx: Reviewed and found to be noncontributory to presenting complaint    8/22- No acute events overnight. Patient sitting up in bed eating breakfast. Notable left sided weakness/nonuse. Would like to participate in a detox program for alcohol use disorder. Discussed case with SW and TCC in hope of finding him an appropriate program. PRN Ativan if CIWA score indicates. Will continue to supplement electrolytes.    8/23: No acute events overnight. Left side still notably weaker than the right. Patient would benefit from from rehab due to the weakness, and due to the fact he's living alone and needing a 2 person assist per PT/OT. Potassium up to 4.1, sodium still a little low. Will continue to follow.  08/24: Patient was evaluated this morning, denies having active complaint, no fever or chills, no nausea vomiting.  08/25: No acute event overnight, patient denies any active complaints.              Objective     Last Recorded Vitals  BP (!) 146/91 (BP Location: Right arm, Patient Position: Sitting) Comment: RN notified  Pulse 87   Temp 36.8 °C (98.2 °F) (Temporal)   Resp 18   Wt  97.5 kg (215 lb)   SpO2 95%   Intake/Output last 3 Shifts:    Intake/Output Summary (Last 24 hours) at 8/25/2024 1221  Last data filed at 8/25/2024 1138  Gross per 24 hour   Intake 1460 ml   Output 675 ml   Net 785 ml       Admission Weight  Weight: 97.5 kg (215 lb) (08/21/24 1259)    Daily Weight  08/21/24 : 97.5 kg (215 lb)    Image Results  ECG 12 lead  Sinus rhythm  Multiple premature complexes, vent & supraven  Borderline prolonged QT interval    See ED provider note for full interpretation and clinical correlation  Confirmed by Shantelle Soto ([id number]) on 8/23/2024 9:39:55 PM      Physical Exam  Constitutional:       Appearance: He is ill-appearing.   HENT:      Head: Normocephalic.   Cardiovascular:      Rate and Rhythm: Normal rate and regular rhythm.      Heart sounds: No murmur heard.     No gallop.   Pulmonary:      Effort: Pulmonary effort is normal. No respiratory distress.      Breath sounds: Normal breath sounds. No wheezing.   Abdominal:      General: There is no distension.      Palpations: There is no mass.      Tenderness: There is no abdominal tenderness. There is no right CVA tenderness or left CVA tenderness.   Musculoskeletal:         General: Deformity present.      Cervical back: No rigidity.      Right lower leg: No edema.      Left lower leg: No edema.      Comments: Hx of L great toe OM s/p amputation    Skin:     Findings: Lesion present.      Comments: Bilateral Knees   Neurological:      Motor: Weakness present.      Gait: Gait abnormal.         Relevant Results  Scheduled medications  apixaban, 5 mg, oral, BID  escitalopram, 20 mg, oral, Daily  folic acid, 1 mg, oral, Daily  metFORMIN, 500 mg, oral, Daily with breakfast  multivitamin with minerals, 1 tablet, oral, Daily  nicotine, 1 patch, transdermal, q24h  OLANZapine, 5 mg, oral, BID  pantoprazole, 40 mg, oral, Daily before breakfast   Or  pantoprazole, 40 mg, intravenous, Daily before breakfast  polyethylene glycol, 17 g, oral,  Daily  thiamine, 100 mg, oral, Daily  traZODone, 100 mg, oral, Nightly      Continuous medications     PRN medications  PRN medications: acetaminophen, bisacodyl, bisacodyl, guaiFENesin, hydrOXYzine HCL, melatonin, ondansetron **OR** ondansetron  No results found for this or any previous visit (from the past 24 hour(s)).    CT head wo IV contrast    Result Date: 8/21/2024  Interpreted By:  Yudy Baca, STUDY: CT HEAD WO IV CONTRAST;  8/21/2024 2:54 pm   INDICATION: Signs/Symptoms:AMS on eliquis, hx EtOH abuse, unknown if fall.   COMPARISON: 06/04/2024   ACCESSION NUMBER(S): [id number]   ORDERING CLINICIAN: KELLY MAHONEY   TECHNIQUE: Examination was performed in the axial plane using soft tissue and bone algorithm.   FINDINGS: INTRACRANIAL: There is prominence of the ventricular system and cerebral sulci consistent with cerebral atrophy. There are periventricular hypodensities consistent with  moderate small vessel disease. No mass or mass effect is identified. There is no hemorrhage or subdural fluid collection. There is no acute infarct. There is a remote deep lacunar infarct involving the white matter of the right frontal lobe. 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 There is a small remote left cerebellar infarct. There is no fracture of the calvarium.   EXTRACRANIAL: Visualized paranasal sinuses and mastoids are clear.       No acute intracranial pathology.   MACRO: None   Signed by: Yudy Baca 8/21/2024 3:16 PM Dictation workstation:   [id number]    ECG 12 lead    Result Date: 8/21/2024  Sinus rhythm Multiple premature complexes, vent & supraven Borderline prolonged QT interval          Assessment/Plan      Assessment & Plan    Alcohol abuse with impending withdrawal  History of withdrawal seizures, DTs  -Alcohol level on 8/21/24 was 50.  -Given IV loading dose of phenobarb in the ED.   -Will continue on prn IV ativan for now.   -[facility] protocol   -Thiamine, MVI, folate   - consultation  appreciated   -Patient agreeable to detox program   -no signs or symptoms of withdrawal, medically stable, continue to monitor-discharge planning to [facility] for rehab-awaiting approval     Hypokalemia  Hyponatremia (chronic)   -K 3.3 > given replacement in the ED > will recheck   -Check mag   -Likely in setting of above   -Na 133, baseline appears to be lower 130s. Continue to follow.     Generalized weakness  Chronic gait instability requiring use of cane as assistive device   -Denies acute worsening of this; no acute focal or lateralizing deficits   -PT/OT  -CTH without acute process; does note prior evidence of strokes. When asked, patient declined wishing for additional evaluation into this at this time.   -Continue PT/OT, up with assist.  -2 person assist with PT and OT, rehab facility recommended     Tobacco Use Disorder   -Smoking cessation education provided during hospitalization   -NRT discussed and offered, does want a patch  -Patient not motivated to quit      History of PE   -Had an acute saddle PE earlier this summer   -He was able to list Eliquis and told me he is taking it as prescribed   -There is no current unilateral edema, CP/pressure, palpitations, SOB. Doing well on RA. No bleeding on CTH.   -Resume Eliquis; fall precautions      GERD  -Continue PPI      Bipolar disorder, anxiety, depression   -Continue home medications           Nicko Cook MD

## 2024-08-25 NOTE — PROGRESS NOTES
8/25/24 1017  [facility] stated auth still pending at this time. Requested  auth team to escalate this.   Venecia Valenzuela RN, BSN, [place]/ [facility] CT Supervisor

## 2024-08-25 NOTE — CARE PLAN
The patient's goals for the shift include      The clinical goals for the shift include Patient will remain free from falls this shift.    Over the shift, the patient remains free from falls.

## 2024-08-25 NOTE — PROGRESS NOTES
Physical Therapy    Physical Therapy Treatment    Patient Name: Teddy García  MRN: [id number]  Today's Date: 8/25/2024  Time Calculation  Start Time: 0827  Stop Time: 0844  Time Calculation (min): 17 min    Assessment/Plan   PT Assessment  PT Assessment Results: Decreased strength, Decreased range of motion, Decreased endurance, Impaired balance  Rehab Prognosis: Fair  End of Session Communication: PCT/NA/CTA  Assessment Comment:  (Pt decreased assist x1 for mobility. Cues for safety awareness and sequencing. Pt mildly agitated with cues. Gait increased to 20x1 Yana. Pt up in chair as breakfast arrived.)  End of Session Patient Position: Up in chair, Alarm on     PT Plan  Treatment/Interventions: Bed mobility, Transfer training, Gait training, Strengthening, Endurance training, Balance training  PT Plan: Ongoing PT  PT Frequency: 4 times per week  PT Discharge Recommendations: Moderate intensity level of continued care  Equipment Recommended upon Discharge:  (TBD)  PT Recommended Transfer Status: Assist x2 (FWW)  PT - OK to Discharge: Yes (WHEN MEDICALLY CLEARED)      General Visit Information:   PT  Visit  Response to Previous Treatment: Patient with no complaints from previous session.  General  Prior to Session Communication: Bedside nurse  Patient Position Received: Bed, 4 rail up, Alarm on  General Comment:  (3302 ok per nursing to tx. Pt agreeable.)    Subjective   Precautions:     Vital Signs:       Objective   Pain:  Pain Assessment  0-10 (Numeric) Pain Score:  (no rating given)  Pain Location: Generalized  Cognition:  Cognition  Overall Cognitive Status: Within Functional Limits  Coordination:     Postural Control:     Extremity/Trunk Assessments:    Activity Tolerance:     Treatments:  Bed Mobility 1  Bed Mobility 1: Supine to sitting  Level of Assistance 1: Contact guard (HOB elevated, cues for use of bedrails)    Ambulation/Gait Training 1  Surface 1: Level tile  Device 1: Rolling walker  Assistance 1:  Minimum assistance, Minimal verbal cues  Quality of Gait 1: Narrow base of support, Diminished heel strike, Inconsistent stride length, Forward flexed posture  Comments/Distance (ft) 1:  (20x1)  Transfer 1  Transfer From 1: Sit to, Stand to  Transfer to 1: Sit, Stand  Transfer Device 1: Walker  Transfer Level of Assistance 1: Minimum assistance, Minimal verbal cues  Trials/Comments 1: cues for hand placement for safety  Transfers 2  Technique 2: Stand pivot  Transfer Device 2: Walker  Transfer Level of Assistance 2: Minimum assistance, Minimal verbal cues  Trials/Comments 2:  (cues for sequenicng and AD approximation)    Outcome Measures:  [facility] Basic Mobility  Turning from your back to your side while in a flat bed without using bedrails: A little  Moving from lying on your back to sitting on the side of a flat bed without using bedrails: A little  Moving to and from bed to chair (including a wheelchair): A lot  Standing up from a chair using your arms (e.g. wheelchair or bedside chair): A little  To walk in hospital room: A lot  Climbing 3-5 steps with railing: Total  Basic Mobility - Total Score: 14    Education Documentation  Home Exercise Program, taught by Rhiannon Lomeli PTA at 8/25/2024  8:54 AM.  Learner: Patient  Readiness: Acceptance  Method: Explanation  Response: Needs Reinforcement    Body Mechanics, taught by Rhiannon Lomeli PTA at 8/25/2024  8:54 AM.  Learner: Patient  Readiness: Acceptance  Method: Explanation  Response: Needs Reinforcement    Precautions, taught by Rhiannon Lomeli PTA at 8/25/2024  8:54 AM.  Learner: Patient  Readiness: Acceptance  Method: Explanation  Response: Needs Reinforcement    ADL Training, taught by Rhiannon Lomeli PTA at 8/25/2024  8:54 AM.  Learner: Patient  Readiness: Acceptance  Method: Explanation  Response: Needs Reinforcement    Mobility Training, taught by Rhiannon Lomeli PTA at 8/25/2024  8:54 AM.  Learner: Patient  Readiness: Acceptance  Method: Explanation  Response: Needs  Reinforcement    Education Comments  No comments found.        OP EDUCATION:       Encounter Problems       Encounter Problems (Active)       Mobility       STG - Patient will ambulate (Progressing)       Start:  08/22/24    Expected End:  09/05/24       FWW MIN A X1 100 FT         Goal 1 (Not Progressing)       Start:  08/22/24    Expected End:  09/12/24       20+ REPS RROM INCREASING STRENGTH TO STABILIZE GAIT            PT Transfers       STG - Transfer from bed to chair (Progressing)       Start:  08/22/24    Expected End:  08/28/24       FWW MIN A X1         STG - Patient to transfer to and from sit to supine (Progressing)       Start:  08/22/24    Expected End:  08/30/24       HOB FLAT CGA NO RAILS         STG - Patient will transfer sit to and from stand (Progressing)       Start:  08/22/24    Expected End:  08/28/24       FWW USING PROPER TECHNIQUE MIN A X1            Pain - Adult

## 2024-08-26 VITALS
HEIGHT: 76 IN | TEMPERATURE: 98 F | HEART RATE: 83 BPM | RESPIRATION RATE: 17 BRPM | BODY MASS INDEX: 26.18 KG/M2 | OXYGEN SATURATION: 94 % | WEIGHT: 215 LBS | DIASTOLIC BLOOD PRESSURE: 79 MMHG | SYSTOLIC BLOOD PRESSURE: 135 MMHG

## 2024-08-26 LAB
ANION GAP SERPL CALC-SCNC: 8 MMOL/L (ref 10–20)
BUN SERPL-MCNC: 16 MG/DL (ref 6–23)
CALCIUM SERPL-MCNC: 9.1 MG/DL (ref 8.6–10.3)
CHLORIDE SERPL-SCNC: 101 MMOL/L (ref 98–107)
CO2 SERPL-SCNC: 29 MMOL/L (ref 21–32)
CREAT SERPL-MCNC: 0.63 MG/DL (ref 0.5–1.3)
EGFRCR SERPLBLD CKD-EPI 2021: >90 ML/MIN/1.73M*2
ERYTHROCYTE [DISTWIDTH] IN BLOOD BY AUTOMATED COUNT: 14.2 % (ref 11.5–14.5)
GLUCOSE SERPL-MCNC: 128 MG/DL (ref 74–99)
HCT VFR BLD AUTO: 41.3 % (ref 41–52)
HGB BLD-MCNC: 13.7 G/DL (ref 13.5–17.5)
MAGNESIUM SERPL-MCNC: 1.59 MG/DL (ref 1.6–2.4)
MCH RBC QN AUTO: 31.1 PG (ref 26–34)
MCHC RBC AUTO-ENTMCNC: 33.2 G/DL (ref 32–36)
MCV RBC AUTO: 94 FL (ref 80–100)
NRBC BLD-RTO: 0 /100 WBCS (ref 0–0)
PLATELET # BLD AUTO: 260 X10*3/UL (ref 150–450)
POTASSIUM SERPL-SCNC: 4.3 MMOL/L (ref 3.5–5.3)
RBC # BLD AUTO: 4.41 X10*6/UL (ref 4.5–5.9)
SODIUM SERPL-SCNC: 134 MMOL/L (ref 136–145)
WBC # BLD AUTO: 4.4 X10*3/UL (ref 4.4–11.3)

## 2024-08-26 PROCEDURE — 2500000002 HC RX 250 W HCPCS SELF ADMINISTERED DRUGS (ALT 637 FOR MEDICARE OP, ALT 636 FOR OP/ED): Performed by: STUDENT IN AN ORGANIZED HEALTH CARE EDUCATION/TRAINING PROGRAM

## 2024-08-26 PROCEDURE — 83735 ASSAY OF MAGNESIUM: CPT | Performed by: INTERNAL MEDICINE

## 2024-08-26 PROCEDURE — 85027 COMPLETE CBC AUTOMATED: CPT | Performed by: INTERNAL MEDICINE

## 2024-08-26 PROCEDURE — 80048 BASIC METABOLIC PNL TOTAL CA: CPT | Performed by: INTERNAL MEDICINE

## 2024-08-26 PROCEDURE — 2500000002 HC RX 250 W HCPCS SELF ADMINISTERED DRUGS (ALT 637 FOR MEDICARE OP, ALT 636 FOR OP/ED): Performed by: INTERNAL MEDICINE

## 2024-08-26 PROCEDURE — 36415 COLL VENOUS BLD VENIPUNCTURE: CPT | Performed by: INTERNAL MEDICINE

## 2024-08-26 PROCEDURE — 2500000001 HC RX 250 WO HCPCS SELF ADMINISTERED DRUGS (ALT 637 FOR MEDICARE OP): Performed by: STUDENT IN AN ORGANIZED HEALTH CARE EDUCATION/TRAINING PROGRAM

## 2024-08-26 PROCEDURE — 99239 HOSP IP/OBS DSCHRG MGMT >30: CPT | Performed by: INTERNAL MEDICINE

## 2024-08-26 PROCEDURE — 2500000004 HC RX 250 GENERAL PHARMACY W/ HCPCS (ALT 636 FOR OP/ED): Performed by: INTERNAL MEDICINE

## 2024-08-26 RX ORDER — LANOLIN ALCOHOL/MO/W.PET/CERES
400 CREAM (GRAM) TOPICAL DAILY
Status: DISCONTINUED | OUTPATIENT
Start: 2024-08-26 | End: 2024-08-26 | Stop reason: HOSPADM

## 2024-08-26 RX ORDER — LANOLIN ALCOHOL/MO/W.PET/CERES
400 CREAM (GRAM) TOPICAL DAILY
Start: 2024-08-26

## 2024-08-26 ASSESSMENT — COGNITIVE AND FUNCTIONAL STATUS - GENERAL
EATING MEALS: A LITTLE
PERSONAL GROOMING: A LITTLE
MOBILITY SCORE: 14
TURNING FROM BACK TO SIDE WHILE IN FLAT BAD: A LITTLE
TOILETING: A LITTLE
MOVING FROM LYING ON BACK TO SITTING ON SIDE OF FLAT BED WITH BEDRAILS: A LITTLE
WALKING IN HOSPITAL ROOM: A LOT
DAILY ACTIVITIY SCORE: 14
DRESSING REGULAR UPPER BODY CLOTHING: A LOT
STANDING UP FROM CHAIR USING ARMS: A LITTLE
HELP NEEDED FOR BATHING: TOTAL
MOVING TO AND FROM BED TO CHAIR: A LOT
CLIMB 3 TO 5 STEPS WITH RAILING: TOTAL
DRESSING REGULAR LOWER BODY CLOTHING: A LOT

## 2024-08-26 ASSESSMENT — PAIN SCALES - GENERAL: PAINLEVEL_OUTOF10: 0 - NO PAIN

## 2024-08-26 NOTE — DISCHARGE SUMMARY
Discharge Diagnosis  Alcohol withdrawal syndrome without complication (Multi)  Electrolyte abnormalities  Physical debility/deconditioning      This discharge took greater than 35 minutes.    Test Results Pending At Discharge  Pending Labs       Order Current Status    Extra Urine [place] Tube Collected (08/23/24 0502)            Hospital Course   Teddy García is a 63 y.o. male with PMHx s/f HTN (no longer on meds), prediabetes (A1c 5.8%), prior PE on Eliquis, prior OM of the L great toe s/p amputation, GERD, bipolar disorder, anxiety, depression, alcohol abuse abuse with h/o withdrawal seizures / DTs, tobacco use disorder, chronic gait instability (requires a cane at baseline), presenting with wishes to undergo treatment for alcohol abuse. Pt is very flat with his responses and a difficult historian. He reports he presented to the emergency department today with wishes to undergo treatment of his alcohol use disorder, not wanting to provide much additional insight into this. Reports he is still drinking about 1 pint of vodka daily and a six pack of beer, with his last drinking being in the morning today (08/21/2024). He was initially evaluated in the ED and discharged up to [facility]; however, they brought him back down to the ED (his belongings were returned in bags during my exam) after they informed the emergency department that he was not able to participate in their services because he would need to be able to be ambulatory without assistive devices as they do not have the capabilities to assist with ambulation there. On my evaluation, the patient reports generalized weakness and aches / pains where are at baseline. He admits to falling on the way to his couch (got tripped up) a few days ago and that he scraped his knees in the process. He reports compliance with his Eliquis and the only missed dose was this morning d/t being in the ED. He denies CP/pressure, palpitations, SOB, HWANG, HA, vision changes,  f/c/n/v/d/abd pain, focal and/or lateralizing sensory or motor deficits. He was resting comfortably watching TV in the ED.      ED Course (Summary):   Vitals on presentation: T 98.1, /86, HR 96 > 74, R 18, SpO2 96% RA  Labs: CBC without abnormalities. CMP with glucose 118, Na 133, K 3.3, BUN 5, sCr 0.53, LFTs without notable abnormalities. Alcohol 50 (from 1349 this afternoon).   Imaging: CTH - no acute process, evidence of prior infarcts (remote deep lacunar infarct involving the white matter of the right frontal lobe. 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 There is a small remote left cerebellar infarct.)   Interventions: 20mEq Kcl replacement and initial discharge to [facility]; followed by repeat evaluation + phenobarb 130IV x1, ativan IV per protocol, thiamine/mvi/folate, admitted to medicine      PMHx: As above   Surgical Hx: Tonsillectomy, ORIF left arm fracture, amputation of L great toe  Social Hx: Smoking 1ppd x decades; not motivated to quit. Drinking ~1 pint of vodka and 1 six pack of beer daily. Single, lives alone.   Family Hx: Reviewed and found to be noncontributory to presenting complaint     8/22- No acute events overnight. Patient sitting up in bed eating breakfast. Notable left sided weakness/nonuse. Would like to participate in a detox program for alcohol use disorder. Discussed case with SW and TCC in hope of finding him an appropriate program. PRN Ativan if CIWA score indicates. Will continue to supplement electrolytes.     8/23: No acute events overnight. Left side still notably weaker than the right. Patient would benefit from from rehab due to the weakness, and due to the fact he's living alone and needing a 2 person assist per PT/OT. Potassium up to 4.1, sodium still a little low. Will continue to follow.  08/24: Patient was evaluated this morning, denies having active complaint, no fever or chills, no nausea vomiting.  08/25: No acute event overnight, patient denies any active  complaints.   08/26: Patient will be discharged to [facility] for rehabilitation as well as detox for alcohol.  Follow-up with PCP as an outpatient    Pertinent Physical Exam At Time of Discharge  Physical Exam  Patient is awake and orient, not in apparent distress  Eyes: PERRLA, no conjunctival congestion  Chest: Bilateral Air entry, no crackles or wheezing  Heart: s1S2 regular, no murmur  Abdomen: Soft, non tender, BS present  Ext:    Home Medications     Medication List      START taking these medications     magnesium oxide 400 mg (241.3 mg magnesium) tablet; Commonly known as:   Mag-Ox; Take 1 tablet (400 mg) by mouth once daily.     CONTINUE taking these medications     acetaminophen 325 mg tablet; Commonly known as: Tylenol; Take 2 tablets   (650 mg) by mouth every 4 hours if needed for mild pain (1 - 3).   apixaban 5 mg tablet; Commonly known as: Eliquis; Take 1 tablet (5 mg)   by mouth 2 times a day.   escitalopram 20 mg tablet; Commonly known as: Lexapro   folic acid 1 mg tablet; Commonly known as: Folvite; Take 1 tablet (1 mg)   by mouth once daily.   hydrOXYzine pamoate 50 mg capsule; Commonly known as: Vistaril   lidocaine 4 % patch; Place 1 patch over 12 hours on the skin once every   24 hours. Remove & discard patch within 12 hours or as directed by MD.   metFORMIN 500 mg tablet; Commonly known as: Glucophage   multivitamin with minerals tablet; Take 1 tablet by mouth once daily.   OLANZapine 5 mg tablet; Commonly known as: ZyPREXA   thiamine 100 mg tablet; Commonly known as: Vitamin B-1; Take 1 tablet   (100 mg) by mouth once daily.   traZODone 100 mg tablet; Commonly known as: Desyrel; Take 1 tablet (100   mg) by mouth once daily at bedtime.     STOP taking these medications     nicotine 14 mg/24 hr patch; Commonly known as: Nicoderm CQ   nicotine 21 mg/24 hr patch; Commonly known as: Nicoderm CQ   nicotine 7 mg/24 hr patch; Commonly known as: Nicoderm CQ     ASK your doctor about these medications      cyclobenzaprine 5 mg tablet; Commonly known as: Flexeril; Take 1 tablet   (5 mg) by mouth 3 times a day as needed for muscle spasms.       Outpatient Follow-Up  No follow-ups on file.     Nicko Cook MD  8/26/2024  10:48 AM

## 2024-08-26 NOTE — NURSING NOTE
Patient discharged to SNF. Report called to receiving nurse. Belongings sent with the patient and IV removed prior to DC. Instructions sent with the patient to the facility.

## 2024-08-26 NOTE — PROGRESS NOTES
Oliver is approved to [facility]. Will notify Dr. Cook.        08/26/24 1107   Discharge Planning   Does the patient need discharge transport arranged? Yes   RoundTrip coordination needed? Yes   Has discharge transport been arranged? Yes   What day is the transport expected? 08/26/24   What time is the transport expected? 1200     Notified RN and patient of transport time of 12PM to [facility] . Answered all questions and verbalized understanding.  Report number is  [phone]

## 2024-11-26 ENCOUNTER — APPOINTMENT (OUTPATIENT)
Dept: PULMONOLOGY | Facility: HOSPITAL | Age: 64
End: 2024-11-26
Payer: MEDICAID